# Patient Record
Sex: FEMALE | Race: BLACK OR AFRICAN AMERICAN | Employment: OTHER | URBAN - METROPOLITAN AREA
[De-identification: names, ages, dates, MRNs, and addresses within clinical notes are randomized per-mention and may not be internally consistent; named-entity substitution may affect disease eponyms.]

---

## 2011-07-01 LAB — COLONOSCOPY, EXTERNAL: NORMAL

## 2018-07-17 LAB — AMB DEXA, EXTERNAL: NORMAL

## 2019-04-05 LAB
CREATININE, EXTERNAL: 1.9
LDL-C, EXTERNAL: 98

## 2019-11-29 LAB — MAMMOGRAPHY, EXTERNAL: NORMAL

## 2020-06-08 VITALS
HEIGHT: 64 IN | SYSTOLIC BLOOD PRESSURE: 128 MMHG | HEART RATE: 72 BPM | RESPIRATION RATE: 18 BRPM | DIASTOLIC BLOOD PRESSURE: 80 MMHG | BODY MASS INDEX: 35.68 KG/M2 | WEIGHT: 209 LBS

## 2020-06-08 PROBLEM — M17.11 OSTEOARTHRITIS OF RIGHT KNEE: Status: ACTIVE | Noted: 2019-08-08

## 2020-06-08 RX ORDER — ATORVASTATIN CALCIUM 40 MG/1
TABLET, FILM COATED ORAL DAILY
COMMUNITY
End: 2020-09-10

## 2020-06-08 RX ORDER — ATENOLOL 50 MG/1
TABLET ORAL DAILY
COMMUNITY
End: 2021-03-02

## 2020-09-10 RX ORDER — ATORVASTATIN CALCIUM 40 MG/1
TABLET, FILM COATED ORAL
Qty: 90 TAB | Refills: 3 | Status: SHIPPED | OUTPATIENT
Start: 2020-09-10 | End: 2021-08-29

## 2020-09-10 RX ORDER — LOSARTAN POTASSIUM AND HYDROCHLOROTHIAZIDE 25; 100 MG/1; MG/1
TABLET ORAL
Qty: 90 TAB | Refills: 3 | Status: SHIPPED | OUTPATIENT
Start: 2020-09-10 | End: 2021-03-01 | Stop reason: ALTCHOICE

## 2020-09-21 ENCOUNTER — OFFICE VISIT (OUTPATIENT)
Dept: INTERNAL MEDICINE CLINIC | Age: 75
End: 2020-09-21
Payer: MEDICARE

## 2020-09-21 ENCOUNTER — HOSPITAL ENCOUNTER (OUTPATIENT)
Dept: LAB | Age: 75
Discharge: HOME | End: 2020-09-21
Payer: MEDICARE

## 2020-09-21 VITALS
SYSTOLIC BLOOD PRESSURE: 136 MMHG | BODY MASS INDEX: 35.88 KG/M2 | WEIGHT: 205.8 LBS | DIASTOLIC BLOOD PRESSURE: 80 MMHG | RESPIRATION RATE: 20 BRPM | TEMPERATURE: 97.9 F

## 2020-09-21 DIAGNOSIS — I10 HYPERTENSION, ESSENTIAL: Primary | ICD-10-CM

## 2020-09-21 DIAGNOSIS — I10 HYPERTENSION, ESSENTIAL: ICD-10-CM

## 2020-09-21 LAB
ALBUMIN SERPL-MCNC: 4 G/DL (ref 3.5–4.7)
ALBUMIN/GLOB SERPL: 1.2 {RATIO}
ALP SERPL-CCNC: 85 U/L (ref 38–126)
ALT SERPL-CCNC: 27 U/L (ref 3–52)
ANION GAP SERPL CALC-SCNC: 8 MMOL/L
AST SERPL W P-5'-P-CCNC: 20 U/L (ref 14–74)
BASOPHILS # BLD: 0 K/UL (ref 0–0.1)
BASOPHILS NFR BLD: 1 % (ref 0–2)
BILIRUB SERPL-MCNC: 0.5 MG/DL (ref 0.2–1)
BUN SERPL-MCNC: 58 MG/DL (ref 9–21)
BUN/CREAT SERPL: 24
CA-I BLD-MCNC: 9.4 MG/DL (ref 8.5–10.5)
CHLORIDE SERPL-SCNC: 106 MMOL/L (ref 94–111)
CO2 SERPL-SCNC: 24 MMOL/L (ref 21–33)
CREAT SERPL-MCNC: 2.4 MG/DL (ref 0.7–1.2)
EOSINOPHIL # BLD: 0.2 K/UL (ref 0–0.4)
EOSINOPHIL NFR BLD: 5 % (ref 0–5)
ERYTHROCYTE [DISTWIDTH] IN BLOOD BY AUTOMATED COUNT: 16.1 % (ref 11.6–14.5)
GLOBULIN SER CALC-MCNC: 3.4 G/DL
GLUCOSE SERPL-MCNC: 116 MG/DL (ref 70–110)
HCT VFR BLD AUTO: 40.1 % (ref 35–45)
HGB BLD-MCNC: 12.6 % (ref 12–16)
IMM GRANULOCYTES # BLD AUTO: 0 K/UL
IMM GRANULOCYTES NFR BLD AUTO: 0 %
LYMPHOCYTES # BLD: 1.6 K/UL (ref 0.9–3.6)
LYMPHOCYTES NFR BLD: 31 % (ref 21–52)
MCH RBC QN AUTO: 26.6 PG (ref 24–34)
MCHC RBC AUTO-ENTMCNC: 31.4 G/DL (ref 31–37)
MCV RBC AUTO: 84.6 FL (ref 74–97)
MONOCYTES # BLD: 0.5 K/UL (ref 0.05–1.2)
MONOCYTES NFR BLD: 9 % (ref 3–10)
NEUTS SEG # BLD: 2.8 K/UL (ref 1.8–8)
NEUTS SEG NFR BLD: 54 % (ref 40–73)
PLATELET # BLD AUTO: 229 K/UL (ref 135–420)
PMV BLD AUTO: 12.1 FL
POTASSIUM SERPL-SCNC: 3.8 MMOL/L (ref 3.2–5.1)
PROT SERPL-MCNC: 7.4 G/DL (ref 6.1–8.4)
RBC # BLD AUTO: 4.74 M/UL (ref 4.2–5.3)
SODIUM SERPL-SCNC: 138 MMOL/L (ref 135–145)
WBC # BLD AUTO: 5.1 K/UL (ref 4.6–13.2)

## 2020-09-21 PROCEDURE — 80053 COMPREHEN METABOLIC PANEL: CPT

## 2020-09-21 PROCEDURE — G8752 SYS BP LESS 140: HCPCS | Performed by: INTERNAL MEDICINE

## 2020-09-21 PROCEDURE — G8510 SCR DEP NEG, NO PLAN REQD: HCPCS | Performed by: INTERNAL MEDICINE

## 2020-09-21 PROCEDURE — 85025 COMPLETE CBC W/AUTO DIFF WBC: CPT

## 2020-09-21 PROCEDURE — G8536 NO DOC ELDER MAL SCRN: HCPCS | Performed by: INTERNAL MEDICINE

## 2020-09-21 PROCEDURE — G8754 DIAS BP LESS 90: HCPCS | Performed by: INTERNAL MEDICINE

## 2020-09-21 PROCEDURE — G9899 SCRN MAM PERF RSLTS DOC: HCPCS | Performed by: INTERNAL MEDICINE

## 2020-09-21 PROCEDURE — 99213 OFFICE O/P EST LOW 20 MIN: CPT | Performed by: INTERNAL MEDICINE

## 2020-09-21 PROCEDURE — G8417 CALC BMI ABV UP PARAM F/U: HCPCS | Performed by: INTERNAL MEDICINE

## 2020-09-21 PROCEDURE — G8427 DOCREV CUR MEDS BY ELIG CLIN: HCPCS | Performed by: INTERNAL MEDICINE

## 2020-09-21 PROCEDURE — 36415 COLL VENOUS BLD VENIPUNCTURE: CPT

## 2020-09-21 NOTE — PROGRESS NOTES
Sharon Singh presents today for   Chief Complaint   Patient presents with    Hypertension       Is someone accompanying this pt? no    Is the patient using any DME equipment during OV? no    Depression Screening:  3 most recent PHQ Screens 9/21/2020   Little interest or pleasure in doing things Not at all   Feeling down, depressed, irritable, or hopeless Not at all   Total Score PHQ 2 0       Learning Assessment:  Learning Assessment 9/21/2020   PRIMARY LEARNER Patient   HIGHEST LEVEL OF EDUCATION - PRIMARY LEARNER  GRADUATED HIGH SCHOOL OR GED   BARRIERS PRIMARY LEARNER NONE   CO-LEARNER CAREGIVER No   PRIMARY LANGUAGE ENGLISH   LEARNER PREFERENCE PRIMARY DEMONSTRATION   ANSWERED BY patient   RELATIONSHIP SELF       Abuse Screening:  Abuse Screening Questionnaire 9/21/2020   Do you ever feel afraid of your partner? N   Are you in a relationship with someone who physically or mentally threatens you? N   Is it safe for you to go home? Y       Fall Risk  Fall Risk Assessment, last 12 mths 9/21/2020   Able to walk? Yes   Fall in past 12 months? No       ADL  ADL Assessment 9/21/2020   Feeding yourself No Help Needed   Getting dressed No Help Needed   Bathing or showering No Help Needed   Walk across the room (includes cane/walker) No Help Needed   Using the telphone No Help Needed   Taking your medications No Help Needed   Preparing meals No Help Needed   Managing money (expenses/bills) No Help Needed   Moderately strenuous housework (laundry) No Help Needed   Shopping for personal items (toiletries/medicines) No Help Needed   Shopping for groceries No Help Needed   Driving No Help Needed   Climbing a flight of stairs No Help Needed   Getting to places beyond walking distances No Help Needed       Health Maintenance reviewed and discussed and ordered per Provider.     Health Maintenance Due   Topic Date Due    Hepatitis C Screening  1945    DTaP/Tdap/Td series (1 - Tdap) 04/24/1967    Shingrix Vaccine Age 50> (1 of 2) 04/24/1996    FOBT Q1Y Age 50-75  04/24/1996    GLAUCOMA SCREENING Q2Y  04/24/2011    Pneumococcal 65+ years (1 of 1 - PPSV23) 04/24/2011    Lipid Screen  04/05/2020    Flu Vaccine (1) 09/01/2020   . Coordination of Care:  1. Have you been to the ER, urgent care clinic since your last visit? Hospitalized since your last visit? no    2. Have you seen or consulted any other health care providers outside of the [address] [place] [address] since your last visit? Include any pap smears or colon screening.  no

## 2020-09-21 NOTE — PROGRESS NOTES
Sulaiman Gonzalez is a 77 y.o. female presenting for           Chief Complaint   Patient presents with    Hypertension        HPI Mike Johnson comes in today for follow-up of her hypertension she is generally doing well. She is up-to-date on all screening parameters she may. She is having no new complaints or problems. Exercise tolerance is good she tells me she was tested earlier this summer for COVID-19 apparently her community testing program and she tested negative. Past Medical History:   Diagnosis Date    Edema of foot 1/11/2016    Hyperlipidemia 6/24/2015    Hypertension, essential 9/20/2016    Osteoarthritis of right knee 8/8/2019    Pelvic mass 7/21/2016    Urinary incontinence 6/27/2016        Current Outpatient Medications on File Prior to Visit   Medication Sig Dispense Refill    atorvastatin (LIPITOR) 40 mg tablet TAKE 1 TABLET DAILY 90 Tab 3    losartan-hydroCHLOROthiazide (HYZAAR) 100-25 mg per tablet TAKE 1 TABLET DAILY 90 Tab 3    atenoloL (TENORMIN) 50 mg tablet Take  by mouth daily.  [DISCONTINUED] losartan 50 mg tab 100 mg, hydroCHLOROthiazide 25 mg tab 25 mg Take  by mouth daily. No current facility-administered medications on file prior to visit. ROS all systems are reviewed and negative. Visit Vitals  /80 (BP 1 Location: Right arm, BP Patient Position: Sitting)   Temp 97.9 °F (36.6 °C)   Resp 20   Wt 205 lb 12.8 oz (93.4 kg)   BMI 35.88 kg/m²        Physical Exam developed overweight -American woman alert oriented cooperative no acute distress normocephalic conjunctiva pink sclera anicteric. Neck supple no lymphadenopathy lungs with good air exchange no wheeze rales rubs or rhonchi heart rhythm regular no murmurs gallops or extrasystoles abdomen not examined extremities no edema neurological physiologic    Assessment & Plan I think Mike Johnson is doing well.   Is been a little over a year since she had lab work and I think that would be a reasonable thing to check a CBC and a CMP because of her hypertension and her medications and I will see her routinely in 4 months time.

## 2020-10-09 ENCOUNTER — TELEPHONE (OUTPATIENT)
Dept: INTERNAL MEDICINE CLINIC | Age: 75
End: 2020-10-09

## 2020-10-09 ENCOUNTER — IMMUNIZATION CLINIC (OUTPATIENT)
Dept: INTERNAL MEDICINE CLINIC | Age: 75
End: 2020-10-09
Payer: MEDICARE

## 2020-10-09 VITALS — TEMPERATURE: 97.1 F | RESPIRATION RATE: 20 BRPM

## 2020-10-09 DIAGNOSIS — Z23 NEEDS FLU SHOT: Primary | ICD-10-CM

## 2020-10-09 DIAGNOSIS — Z23 ENCOUNTER FOR IMMUNIZATION: ICD-10-CM

## 2020-10-09 DIAGNOSIS — Z12.39 ENCOUNTER FOR SCREENING FOR MALIGNANT NEOPLASM OF BREAST, UNSPECIFIED SCREENING MODALITY: Primary | ICD-10-CM

## 2020-10-09 NOTE — PROGRESS NOTES
Awa Lowe is a 77 y.o. female who presents for routine immunization of a influenza vaccine. She denies any symptoms , reactions or allergies that would exclude them from being immunized today. Risks and adverse reactions were discussed and the VIS was given to them. All questions were addressed. She was observed for 15 min post injection. There were no reactions observed.     Jemma Hernandez LPN

## 2020-10-12 ENCOUNTER — TRANSCRIBE ORDER (OUTPATIENT)
Dept: SCHEDULING | Age: 75
End: 2020-10-12

## 2020-10-12 DIAGNOSIS — Z12.31 VISIT FOR SCREENING MAMMOGRAM: Primary | ICD-10-CM

## 2020-11-16 PROCEDURE — 90686 IIV4 VACC NO PRSV 0.5 ML IM: CPT

## 2020-11-16 PROCEDURE — G0008 ADMIN INFLUENZA VIRUS VAC: HCPCS

## 2020-11-30 ENCOUNTER — HOSPITAL ENCOUNTER (OUTPATIENT)
Dept: MAMMOGRAPHY | Age: 75
Discharge: HOME | End: 2020-11-30
Attending: INTERNAL MEDICINE
Payer: MEDICARE

## 2020-11-30 DIAGNOSIS — Z12.31 VISIT FOR SCREENING MAMMOGRAM: ICD-10-CM

## 2020-11-30 PROCEDURE — 77067 SCR MAMMO BI INCL CAD: CPT

## 2021-01-18 ENCOUNTER — OFFICE VISIT (OUTPATIENT)
Dept: INTERNAL MEDICINE CLINIC | Age: 76
End: 2021-01-18
Payer: MEDICARE

## 2021-01-18 VITALS
BODY MASS INDEX: 35.1 KG/M2 | SYSTOLIC BLOOD PRESSURE: 130 MMHG | TEMPERATURE: 98.3 F | HEIGHT: 64 IN | DIASTOLIC BLOOD PRESSURE: 80 MMHG | WEIGHT: 205.6 LBS | RESPIRATION RATE: 20 BRPM

## 2021-01-18 DIAGNOSIS — I10 HYPERTENSION, ESSENTIAL: Primary | ICD-10-CM

## 2021-01-18 DIAGNOSIS — N18.30 STAGE 3 CHRONIC KIDNEY DISEASE, UNSPECIFIED WHETHER STAGE 3A OR 3B CKD (HCC): ICD-10-CM

## 2021-01-18 PROCEDURE — 99213 OFFICE O/P EST LOW 20 MIN: CPT | Performed by: INTERNAL MEDICINE

## 2021-01-18 RX ORDER — AMLODIPINE BESYLATE 5 MG/1
5 TABLET ORAL DAILY
Qty: 30 TAB | Refills: 2 | Status: SHIPPED | OUTPATIENT
Start: 2021-01-18 | End: 2021-03-30 | Stop reason: SDUPTHER

## 2021-01-18 NOTE — PROGRESS NOTES
Hakan Chan is a 77 y.o. female presenting for hypertension          Chief Complaint   Patient presents with    Hypertension        HPI Naif Perez comes in today and she is doing well and asymptomatic. She signed up through our health department to get the COVID-19 vaccine. Generally feels well and she had blood work done before she came in. Back 6 months ago her creatinine had crept up to 1.9 and so wanted her to repeat at this time. Past Medical History:   Diagnosis Date    Edema of foot 1/11/2016    Hyperlipidemia 6/24/2015    Hypertension, essential 9/20/2016    Menopause     Osteoarthritis of right knee 8/8/2019    Pelvic mass 7/21/2016    Urinary incontinence 6/27/2016        Current Outpatient Medications on File Prior to Visit   Medication Sig Dispense Refill    atorvastatin (LIPITOR) 40 mg tablet TAKE 1 TABLET DAILY 90 Tab 3    losartan-hydroCHLOROthiazide (HYZAAR) 100-25 mg per tablet TAKE 1 TABLET DAILY 90 Tab 3    atenoloL (TENORMIN) 50 mg tablet Take  by mouth daily. No current facility-administered medications on file prior to visit. ROS patient is asymptomatic and all systems are reviewed and negative    Visit Vitals  /80 (BP 1 Location: Right arm, BP Patient Position: Sitting)   Temp 98.3 °F (36.8 °C)   Resp 20   Ht 5' 3.5\" (1.613 m)   Wt 205 lb 9.6 oz (93.3 kg)   BMI 35.85 kg/m²        Physical Exam developed modestly overweight -American woman no acute distress normocephalic Activa pink sclera anicteric neck supple no lymphadenopathy no mass lungs clear to auscultation heart rhythm regular    Assessment & Plan unfortunately Rocio's creatinine is up to 2.4 which is quite a jump from 1.9. I think we need to intervene here and I am going to stop her losartan HCT and change her to amlodipine for blood pressure control she will continue her Tenormin.   Were going to see her back in 6 weeks and repeat her blood work in 6 weeks at which point if she has not shown significant improvement we will probably have to refer her to nephrology.

## 2021-02-22 ENCOUNTER — TELEPHONE (OUTPATIENT)
Dept: INTERNAL MEDICINE CLINIC | Age: 76
End: 2021-02-22

## 2021-02-22 DIAGNOSIS — I10 HYPERTENSION, ESSENTIAL: Primary | ICD-10-CM

## 2021-02-22 NOTE — TELEPHONE ENCOUNTER
Patient calls to report that she is having headaches and she believes that it is coming from taking the second covid vaccine last week. She also reports swelling but thinks that is due to the last medicine change that you made, the medicine doesn't have a fluid pill component and her old one did. She also says she needs lab orders sent to the hospital so that she can them done prior to her appointment on March 1.   She can be reached at [phone]

## 2021-02-23 ENCOUNTER — HOSPITAL ENCOUNTER (OUTPATIENT)
Dept: LAB | Age: 76
Discharge: HOME | End: 2021-02-23
Payer: MEDICARE

## 2021-02-23 LAB
ANION GAP SERPL CALC-SCNC: 10 MMOL/L
BUN SERPL-MCNC: 30 MG/DL (ref 9–21)
BUN/CREAT SERPL: 14
CA-I BLD-MCNC: 8.9 MG/DL (ref 8.5–10.5)
CHLORIDE SERPL-SCNC: 104 MMOL/L (ref 94–111)
CO2 SERPL-SCNC: 24 MMOL/L (ref 21–33)
CREAT SERPL-MCNC: 2.2 MG/DL (ref 0.7–1.2)
GLUCOSE SERPL-MCNC: 143 MG/DL (ref 70–110)
POTASSIUM SERPL-SCNC: 3.6 MMOL/L (ref 3.2–5.1)
SODIUM SERPL-SCNC: 138 MMOL/L (ref 135–145)

## 2021-02-23 PROCEDURE — 80048 BASIC METABOLIC PNL TOTAL CA: CPT

## 2021-02-23 PROCEDURE — 36415 COLL VENOUS BLD VENIPUNCTURE: CPT

## 2021-03-01 ENCOUNTER — OFFICE VISIT (OUTPATIENT)
Dept: INTERNAL MEDICINE CLINIC | Age: 76
End: 2021-03-01
Payer: MEDICARE

## 2021-03-01 VITALS
WEIGHT: 201.6 LBS | TEMPERATURE: 97.7 F | DIASTOLIC BLOOD PRESSURE: 74 MMHG | SYSTOLIC BLOOD PRESSURE: 130 MMHG | HEIGHT: 64 IN | BODY MASS INDEX: 34.42 KG/M2 | RESPIRATION RATE: 20 BRPM

## 2021-03-01 DIAGNOSIS — N18.9 CHRONIC KIDNEY DISEASE, UNSPECIFIED CKD STAGE: Primary | ICD-10-CM

## 2021-03-01 DIAGNOSIS — I10 HYPERTENSION, ESSENTIAL: ICD-10-CM

## 2021-03-01 PROCEDURE — 3017F COLORECTAL CA SCREEN DOC REV: CPT | Performed by: INTERNAL MEDICINE

## 2021-03-01 PROCEDURE — G8417 CALC BMI ABV UP PARAM F/U: HCPCS | Performed by: INTERNAL MEDICINE

## 2021-03-01 PROCEDURE — G8752 SYS BP LESS 140: HCPCS | Performed by: INTERNAL MEDICINE

## 2021-03-01 PROCEDURE — 1101F PT FALLS ASSESS-DOCD LE1/YR: CPT | Performed by: INTERNAL MEDICINE

## 2021-03-01 PROCEDURE — G8427 DOCREV CUR MEDS BY ELIG CLIN: HCPCS | Performed by: INTERNAL MEDICINE

## 2021-03-01 PROCEDURE — G8536 NO DOC ELDER MAL SCRN: HCPCS | Performed by: INTERNAL MEDICINE

## 2021-03-01 PROCEDURE — G8754 DIAS BP LESS 90: HCPCS | Performed by: INTERNAL MEDICINE

## 2021-03-01 PROCEDURE — G9899 SCRN MAM PERF RSLTS DOC: HCPCS | Performed by: INTERNAL MEDICINE

## 2021-03-01 PROCEDURE — 1090F PRES/ABSN URINE INCON ASSESS: CPT | Performed by: INTERNAL MEDICINE

## 2021-03-01 PROCEDURE — 99213 OFFICE O/P EST LOW 20 MIN: CPT | Performed by: INTERNAL MEDICINE

## 2021-03-01 PROCEDURE — G8510 SCR DEP NEG, NO PLAN REQD: HCPCS | Performed by: INTERNAL MEDICINE

## 2021-03-01 PROCEDURE — G8399 PT W/DXA RESULTS DOCUMENT: HCPCS | Performed by: INTERNAL MEDICINE

## 2021-03-01 NOTE — PROGRESS NOTES
Waldo Amanda is a 77 y.o. female presenting for hypertension and chronic kidney disease          Chief Complaint   Patient presents with    Hypertension        HPI. He is back today for follow-up of her hypertension. She is having some headaches which she blames on the second Covid vaccine which she received recently. Tylenol seems to work and they seem to be getting better. She is now 6 weeks off of losartan HCT and has not had any problems with the amlodipine except that occasionally having some swelling from time to time she asks for a fluid pill and I told her I thought I wanted her to see the nephrologist first and we can discuss that after they took a look at her for her chronic kidney disease. Her creatinine is slightly down to 2.2 but still not where we would like it and even though her blood pressure is better controlled I would like for her to see nephrology. Past Medical History:   Diagnosis Date    Edema of foot 1/11/2016    Hyperlipidemia 6/24/2015    Hypertension, essential 9/20/2016    Menopause     Osteoarthritis of right knee 8/8/2019    Pelvic mass 7/21/2016    Urinary incontinence 6/27/2016        Current Outpatient Medications on File Prior to Visit   Medication Sig Dispense Refill    amLODIPine (NORVASC) 5 mg tablet Take 1 Tab by mouth daily. 30 Tab 2    atorvastatin (LIPITOR) 40 mg tablet TAKE 1 TABLET DAILY 90 Tab 3    atenoloL (TENORMIN) 50 mg tablet Take  by mouth daily.  [DISCONTINUED] losartan-hydroCHLOROthiazide (HYZAAR) 100-25 mg per tablet TAKE 1 TABLET DAILY 90 Tab 3     No current facility-administered medications on file prior to visit.          ROS all systems are reviewed and negative except as noted in the history of present illness    Visit Vitals  /74 (BP 1 Location: Right arm, BP Patient Position: Sitting, BP Cuff Size: Large adult)   Temp 97.7 °F (36.5 °C)   Resp 20   Ht 5' 3.5\" (1.613 m)   Wt 201 lb 9.6 oz (91.4 kg)   BMI 35.15 kg/m²        Physical Exam well-developed -American woman alert and oriented no distress normocephalic conjunctiva pink sclera anicteric neck supple no lymphadenopathy lungs bilaterally clear to auscultation heart regular rhythm no murmurs or extrasystoles abdomen not examined, extremities with trace of ankle edema bilaterally. Assessment & Plan I would like for [facility] to see nephrology and we are going to send in a referral.  I will see her back once they are completed their work-up.

## 2021-03-01 NOTE — PROGRESS NOTES
Randell Car presents today for   Chief Complaint   Patient presents with    Hypertension       Is someone accompanying this pt? no    Is the patient using any DME equipment during OV? no    Depression Screening:  3 most recent PHQ Screens 3/1/2021   Little interest or pleasure in doing things Not at all   Feeling down, depressed, irritable, or hopeless Not at all   Total Score PHQ 2 0       Learning Assessment:  Learning Assessment 9/21/2020   PRIMARY LEARNER Patient   HIGHEST LEVEL OF EDUCATION - PRIMARY LEARNER  GRADUATED HIGH SCHOOL OR GED   BARRIERS PRIMARY LEARNER NONE   CO-LEARNER CAREGIVER No   PRIMARY LANGUAGE ENGLISH   LEARNER PREFERENCE PRIMARY DEMONSTRATION   ANSWERED BY patient   RELATIONSHIP SELF       Abuse Screening:  Abuse Screening Questionnaire 3/1/2021   Do you ever feel afraid of your partner? N   Are you in a relationship with someone who physically or mentally threatens you? N   Is it safe for you to go home? Y       Fall Risk  Fall Risk Assessment, last 12 mths 3/1/2021   Able to walk? Yes   Fall in past 12 months? 0   Do you feel unsteady? 0   Are you worried about falling 0       ADL  ADL Assessment 3/1/2021   Feeding yourself No Help Needed   Getting from bed to chair No Help Needed   Getting dressed No Help Needed   Bathing or showering No Help Needed   Walk across the room (includes cane/walker) No Help Needed   Using the telphone No Help Needed   Taking your medications No Help Needed   Preparing meals No Help Needed   Managing money (expenses/bills) No Help Needed   Moderately strenuous housework (laundry) No Help Needed   Shopping for personal items (toiletries/medicines) No Help Needed   Shopping for groceries No Help Needed   Driving No Help Needed   Climbing a flight of stairs No Help Needed   Getting to places beyond walking distances No Help Needed       Health Maintenance reviewed and discussed and ordered per Provider.     Health Maintenance Due   Topic Date Due    Hepatitis C Screening  1945    Shingrix Vaccine Age 50> (1 of 2) 04/24/1996    GLAUCOMA SCREENING Q2Y  04/24/2011    Lipid Screen  04/05/2020    Medicare Yearly Exam  09/21/2020   . Coordination of Care:  1. Have you been to the ER, urgent care clinic since your last visit? Hospitalized since your last visit? no    2. Have you seen or consulted any other health care providers outside of the [address] [place] [address] since your last visit? Include any pap smears or colon screening.  no

## 2021-03-02 RX ORDER — ATENOLOL 50 MG/1
TABLET ORAL
Qty: 180 TAB | Refills: 3 | Status: SHIPPED | OUTPATIENT
Start: 2021-03-02 | End: 2022-02-18 | Stop reason: SDUPTHER

## 2021-03-30 RX ORDER — AMLODIPINE BESYLATE 5 MG/1
5 TABLET ORAL DAILY
Qty: 90 TAB | Refills: 2 | Status: SHIPPED | OUTPATIENT
Start: 2021-03-30 | End: 2021-06-28

## 2021-04-16 ENCOUNTER — HOSPITAL ENCOUNTER (EMERGENCY)
Age: 76
Discharge: HOME | End: 2021-04-16
Attending: EMERGENCY MEDICINE
Payer: MEDICARE

## 2021-04-16 ENCOUNTER — APPOINTMENT (OUTPATIENT)
Dept: VASCULAR SURGERY | Age: 76
End: 2021-04-16
Attending: EMERGENCY MEDICINE
Payer: MEDICARE

## 2021-04-16 ENCOUNTER — APPOINTMENT (OUTPATIENT)
Dept: GENERAL RADIOLOGY | Age: 76
End: 2021-04-16
Attending: EMERGENCY MEDICINE
Payer: MEDICARE

## 2021-04-16 VITALS
HEART RATE: 69 BPM | RESPIRATION RATE: 18 BRPM | SYSTOLIC BLOOD PRESSURE: 132 MMHG | TEMPERATURE: 98.6 F | OXYGEN SATURATION: 99 % | DIASTOLIC BLOOD PRESSURE: 70 MMHG

## 2021-04-16 DIAGNOSIS — M71.22 BAKER'S CYST OF KNEE, LEFT: Primary | ICD-10-CM

## 2021-04-16 DIAGNOSIS — M17.12 OSTEOARTHRITIS OF LEFT KNEE, UNSPECIFIED OSTEOARTHRITIS TYPE: ICD-10-CM

## 2021-04-16 LAB
ANION GAP SERPL CALC-SCNC: 12 MMOL/L
BASOPHILS # BLD: 0 K/UL (ref 0–0.1)
BASOPHILS NFR BLD: 0 % (ref 0–2)
BUN SERPL-MCNC: 40 MG/DL (ref 9–21)
BUN/CREAT SERPL: 17
CA-I BLD-MCNC: 8.9 MG/DL (ref 8.5–10.5)
CHLORIDE SERPL-SCNC: 101 MMOL/L (ref 94–111)
CO2 SERPL-SCNC: 24 MMOL/L (ref 21–33)
CREAT SERPL-MCNC: 2.4 MG/DL (ref 0.7–1.2)
D DIMER PPP FEU-MCNC: 3.14 UG/ML(FEU)
EOSINOPHIL # BLD: 0.1 K/UL (ref 0–0.4)
EOSINOPHIL NFR BLD: 1 % (ref 0–5)
ERYTHROCYTE [DISTWIDTH] IN BLOOD BY AUTOMATED COUNT: 16.2 % (ref 11.6–14.5)
GLUCOSE SERPL-MCNC: 140 MG/DL (ref 70–110)
HCT VFR BLD AUTO: 35.5 % (ref 35–45)
HGB BLD-MCNC: 10.9 G/DL (ref 12–16)
IMM GRANULOCYTES # BLD AUTO: 0 K/UL
IMM GRANULOCYTES NFR BLD AUTO: 0 %
LYMPHOCYTES # BLD: 1 K/UL (ref 0.9–3.6)
LYMPHOCYTES NFR BLD: 13 % (ref 21–52)
MCH RBC QN AUTO: 25.3 PG (ref 24–34)
MCHC RBC AUTO-ENTMCNC: 30.7 G/DL (ref 31–37)
MCV RBC AUTO: 82.4 FL (ref 74–97)
MONOCYTES # BLD: 1 K/UL (ref 0.05–1.2)
MONOCYTES NFR BLD: 13 % (ref 3–10)
NEUTS SEG # BLD: 5.8 K/UL (ref 1.8–8)
NEUTS SEG NFR BLD: 73 % (ref 40–73)
PLATELET # BLD AUTO: 278 K/UL (ref 135–420)
PMV BLD AUTO: 12.1 FL (ref 9.2–11.8)
POTASSIUM SERPL-SCNC: 3.5 MMOL/L (ref 3.2–5.1)
RBC # BLD AUTO: 4.31 M/UL (ref 4.2–5.3)
SODIUM SERPL-SCNC: 137 MMOL/L (ref 135–145)
WBC # BLD AUTO: 7.9 K/UL (ref 4.6–13.2)

## 2021-04-16 PROCEDURE — 80048 BASIC METABOLIC PNL TOTAL CA: CPT

## 2021-04-16 PROCEDURE — 85025 COMPLETE CBC W/AUTO DIFF WBC: CPT

## 2021-04-16 PROCEDURE — 73564 X-RAY EXAM KNEE 4 OR MORE: CPT

## 2021-04-16 PROCEDURE — 85379 FIBRIN DEGRADATION QUANT: CPT

## 2021-04-16 PROCEDURE — 74011250637 HC RX REV CODE- 250/637: Performed by: EMERGENCY MEDICINE

## 2021-04-16 PROCEDURE — 99283 EMERGENCY DEPT VISIT LOW MDM: CPT

## 2021-04-16 PROCEDURE — 36415 COLL VENOUS BLD VENIPUNCTURE: CPT

## 2021-04-16 PROCEDURE — 93971 EXTREMITY STUDY: CPT

## 2021-04-16 RX ORDER — KETOROLAC TROMETHAMINE 10 MG/1
10 TABLET, FILM COATED ORAL ONCE
Status: COMPLETED | OUTPATIENT
Start: 2021-04-16 | End: 2021-04-16

## 2021-04-16 RX ORDER — KETOROLAC TROMETHAMINE 10 MG/1
10 TABLET, FILM COATED ORAL
Qty: 15 TAB | Refills: 0 | Status: SHIPPED | OUTPATIENT
Start: 2021-04-16 | End: 2021-05-13 | Stop reason: SINTOL

## 2021-04-16 RX ORDER — KETOROLAC TROMETHAMINE 10 MG/1
10 TABLET, FILM COATED ORAL
Qty: 15 TAB | Refills: 0 | Status: SHIPPED | OUTPATIENT
Start: 2021-04-16 | End: 2021-04-16 | Stop reason: SDUPTHER

## 2021-04-16 RX ORDER — CHOLECALCIFEROL TAB 125 MCG (5000 UNIT) 125 MCG
5000 TAB ORAL DAILY
COMMUNITY

## 2021-04-16 RX ORDER — ASPIRIN 81 MG/1
81 TABLET ORAL DAILY
COMMUNITY

## 2021-04-16 RX ADMIN — KETOROLAC TROMETHAMINE 10 MG: 10 TABLET, FILM COATED ORAL at 11:01

## 2021-04-16 NOTE — ED NOTES
Patient presents to the ED with  in wheelchair with complaint of swelling to the left knee and left lower leg. There is no trauma or injury.

## 2021-04-16 NOTE — ED PROVIDER NOTES
EMERGENCY DEPARTMENT HISTORY AND PHYSICAL EXAM      Date: 4/16/2021  Patient Name: Penelope Veliz    History of Presenting Illness     Chief Complaint   Patient presents with    Knee Pain       History Provided By: Patient    HPI: Penelope Veliz, 77 y.o. female with a past medical history significant hypertension, hyperlipidemia and Osteoarthritis right knee, history of pelvic mass presents to the ED with cc of pain left knee with swelling of left leg over the last several days. Patient denies any trauma. She rates the pain a 6 out of 10. And is worse with exertion. She has no fever, no nausea or vomiting. There are no other complaints, changes, or physical findings at this time. PCP: Lamar Street MD    No current facility-administered medications on file prior to encounter. Current Outpatient Medications on File Prior to Encounter   Medication Sig Dispense Refill    cholecalciferol (Vitamin D3) (5000 Units/125 mcg) tab tablet Take 5,000 Units by mouth daily.  aspirin delayed-release 81 mg tablet Take 81 mg by mouth daily.  amLODIPine (NORVASC) 5 mg tablet Take 1 Tab by mouth daily for 90 days.  90 Tab 2    atenoloL (TENORMIN) 50 mg tablet TAKE 1 TABLET TWICE A DAY  (NEW DIRECTIONS) 180 Tab 3    atorvastatin (LIPITOR) 40 mg tablet TAKE 1 TABLET DAILY 90 Tab 3       Past History     Past Medical History:  Past Medical History:   Diagnosis Date    Edema of foot 1/11/2016    Hyperlipidemia 6/24/2015    Hypertension, essential 9/20/2016    Menopause     Osteoarthritis of right knee 8/8/2019    Pelvic mass 7/21/2016    Urinary incontinence 6/27/2016       Past Surgical History:  Past Surgical History:   Procedure Laterality Date    HX COLONOSCOPY  07/01/2011    DIVERTICULA IN THE LEFT COLON- RECOMMENDED 5 YEAR F/U    HX HYSTERECTOMY  UNKNOWN    PARTIAL- STILL HAS OVARIES       Family History:  Family History   Problem Relation Age of Onset    Breast Cancer Sister     Kidney Disease Brother         KIDNEY TRANSPLANT    Arthritis-osteo Son         MENTALLY DELAYED       Social History:  Social History     Tobacco Use    Smoking status: Never Smoker    Smokeless tobacco: Never Used   Substance Use Topics    Alcohol use: Not on file    Drug use: Not on file       Allergies:  No Known Allergies      Review of Systems     Review of Systems   Constitutional: Negative for chills and fever. HENT: Negative for congestion and sore throat. Respiratory: Negative for cough and shortness of breath. Cardiovascular: Negative for chest pain. Gastrointestinal: Negative for abdominal distention, nausea and vomiting. Genitourinary: Negative for difficulty urinating, dysuria, flank pain, frequency, hematuria, urgency, vaginal bleeding and vaginal discharge. Musculoskeletal: Positive for arthralgias and joint swelling. Skin: Negative for rash and wound. Neurological: Negative for dizziness, weakness, light-headedness and headaches. Hematological: Negative for adenopathy. Physical Exam     Physical Exam  Vitals signs and nursing note reviewed. Constitutional:       General: She is not in acute distress. Appearance: She is well-developed. She is not diaphoretic. Comments: Obese female in no distress. HENT:      Head: Normocephalic and atraumatic. Jaw: No trismus. Right Ear: External ear normal. No swelling or tenderness. Tympanic membrane is not perforated, erythematous or bulging. Left Ear: External ear normal. No swelling or tenderness. Tympanic membrane is not perforated, erythematous or bulging. Nose: Nose normal. No mucosal edema or rhinorrhea. Right Sinus: No maxillary sinus tenderness or frontal sinus tenderness. Left Sinus: No maxillary sinus tenderness or frontal sinus tenderness. Mouth/Throat:      Mouth: No oral lesions. Dentition: No dental abscesses. Pharynx: Uvula midline.  No oropharyngeal exudate, posterior oropharyngeal erythema or uvula swelling. Tonsils: No tonsillar abscesses. Eyes:      General: No scleral icterus. Right eye: No discharge. Left eye: No discharge. Conjunctiva/sclera: Conjunctivae normal.   Neck:      Musculoskeletal: Normal range of motion and neck supple. Cardiovascular:      Rate and Rhythm: Normal rate and regular rhythm. Heart sounds: Normal heart sounds. No murmur. No friction rub. No gallop. Pulmonary:      Effort: Pulmonary effort is normal. No tachypnea, accessory muscle usage or respiratory distress. Breath sounds: Normal breath sounds. No decreased breath sounds, wheezing, rhonchi or rales. Abdominal:      Palpations: Abdomen is soft. Musculoskeletal: Normal range of motion. General: No tenderness. Comments: Left leg with 2+ edema. There is no warmth to touch of the skin. There is tenderness diffusely of the knee worse posteriorly. No palpable mass. Tarun Rhymes' sign is positive. Pulses in the left leg intact. Lymphadenopathy:      Cervical: No cervical adenopathy. Skin:     General: Skin is warm and dry. Findings: No erythema or rash. Neurological:      Mental Status: She is alert and oriented to person, place, and time. Psychiatric:         Judgment: Judgment normal.         Lab and Diagnostic Study Results     Labs -   No results found for this or any previous visit (from the past 12 hour(s)). Radiologic Studies -   @lastxrresult@  CT Results  (Last 48 hours)    None        CXR Results  (Last 48 hours)    None            Medical Decision Making   - I am the first provider for this patient. - I reviewed the vital signs, available nursing notes, past medical history, past surgical history, family history and social history. - Initial assessment performed. The patients presenting problems have been discussed, and they are in agreement with the care plan formulated and outlined with them.   I have encouraged them to ask questions as they arise throughout their visit. Vital Signs-Reviewed the patient's vital signs. Patient Vitals for the past 12 hrs:   Temp Pulse Resp SpO2   04/16/21 1026 98.6 °F (37 °C) 69 18 99 %       Records Reviewed: Nursing Notes and Old Medical Records    The patient presents with pain and swelling left leg with a differential diagnosis of DVT, cellulitis, venous stasis, knee arthritis, Baker's cyst      ED Course:          Provider Notes (Medical Decision Making): MDM       Procedures   Medical Decision Makingedical Decision Making  Performed by: Bibiana Womack MD  PROCEDURES:  Procedures       Disposition   Disposition: Condition stable    Diagnosis: No diagnosis found. Disposition:     Follow-up Information    None         Patient's Medications   Start Taking    No medications on file   Continue Taking    AMLODIPINE (NORVASC) 5 MG TABLET    Take 1 Tab by mouth daily for 90 days. ASPIRIN DELAYED-RELEASE 81 MG TABLET    Take 81 mg by mouth daily. ATENOLOL (TENORMIN) 50 MG TABLET    TAKE 1 TABLET TWICE A DAY  (NEW DIRECTIONS)    ATORVASTATIN (LIPITOR) 40 MG TABLET    TAKE 1 TABLET DAILY    CHOLECALCIFEROL (VITAMIN D3) (5000 UNITS/125 MCG) TAB TABLET    Take 5,000 Units by mouth daily. These Medications have changed    No medications on file   Stop Taking    No medications on file       DISCHARGE PLAN:  1. Current Discharge Medication List      CONTINUE these medications which have NOT CHANGED    Details   cholecalciferol (Vitamin D3) (5000 Units/125 mcg) tab tablet Take 5,000 Units by mouth daily. aspirin delayed-release 81 mg tablet Take 81 mg by mouth daily. amLODIPine (NORVASC) 5 mg tablet Take 1 Tab by mouth daily for 90 days. Qty: 90 Tab, Refills: 2      atenoloL (TENORMIN) 50 mg tablet TAKE 1 TABLET TWICE A DAY  (NEW DIRECTIONS)  Qty: 180 Tab, Refills: 3      atorvastatin (LIPITOR) 40 mg tablet TAKE 1 TABLET DAILY  Qty: 90 Tab, Refills: 3           2. Follow-up Information    None       3. Return to ED if worse   4. Current Discharge Medication List            Diagnosis     Clinical Impression: No diagnosis found. Attestations:    Lev Jacobs MD    Please note that this dictation was completed with [employer], the computer voice recognition software. Quite often unanticipated grammatical, syntax, homophones, and other interpretive errors are inadvertently transcribed by the computer software. Please disregard these errors. Please excuse any errors that have escaped final proofreading. Thank you.

## 2021-04-16 NOTE — ED NOTES
Searched for a vein to get labs from - unable to find one on either arm. Lab called and they will come and do a straight stick.

## 2021-04-19 ENCOUNTER — TELEPHONE (OUTPATIENT)
Dept: INTERNAL MEDICINE CLINIC | Age: 76
End: 2021-04-19

## 2021-04-19 NOTE — TELEPHONE ENCOUNTER
Patient went to ER on Friday and they told her to call you. She is trying to get up with you this morning. Could you call her. She needs to know what she needs to do next. I have talked to the patient and she will take Tylenol and Ibuprofen in between the Toradol she was given in the ER until her appt on Thursday. If this doesn't work she will call back and we will address at that time. Gertrude Foster LPN.  Dr Street informed

## 2021-04-22 ENCOUNTER — OFFICE VISIT (OUTPATIENT)
Dept: INTERNAL MEDICINE CLINIC | Age: 76
End: 2021-04-22
Payer: MEDICARE

## 2021-04-22 VITALS
BODY MASS INDEX: 34.31 KG/M2 | RESPIRATION RATE: 20 BRPM | HEIGHT: 64 IN | SYSTOLIC BLOOD PRESSURE: 160 MMHG | WEIGHT: 201 LBS | DIASTOLIC BLOOD PRESSURE: 78 MMHG

## 2021-04-22 DIAGNOSIS — M25.462 EFFUSION OF LEFT KNEE: ICD-10-CM

## 2021-04-22 DIAGNOSIS — M71.22 SYNOVIAL CYST OF LEFT POPLITEAL SPACE: Primary | ICD-10-CM

## 2021-04-22 DIAGNOSIS — N18.30 STAGE 3 CHRONIC KIDNEY DISEASE, UNSPECIFIED WHETHER STAGE 3A OR 3B CKD (HCC): ICD-10-CM

## 2021-04-22 DIAGNOSIS — I10 HYPERTENSION, ESSENTIAL: ICD-10-CM

## 2021-04-22 PROCEDURE — G9899 SCRN MAM PERF RSLTS DOC: HCPCS | Performed by: INTERNAL MEDICINE

## 2021-04-22 PROCEDURE — G8417 CALC BMI ABV UP PARAM F/U: HCPCS | Performed by: INTERNAL MEDICINE

## 2021-04-22 PROCEDURE — G8753 SYS BP > OR = 140: HCPCS | Performed by: INTERNAL MEDICINE

## 2021-04-22 PROCEDURE — G8427 DOCREV CUR MEDS BY ELIG CLIN: HCPCS | Performed by: INTERNAL MEDICINE

## 2021-04-22 PROCEDURE — G8754 DIAS BP LESS 90: HCPCS | Performed by: INTERNAL MEDICINE

## 2021-04-22 PROCEDURE — 1101F PT FALLS ASSESS-DOCD LE1/YR: CPT | Performed by: INTERNAL MEDICINE

## 2021-04-22 PROCEDURE — 99213 OFFICE O/P EST LOW 20 MIN: CPT | Performed by: INTERNAL MEDICINE

## 2021-04-22 PROCEDURE — 1090F PRES/ABSN URINE INCON ASSESS: CPT | Performed by: INTERNAL MEDICINE

## 2021-04-22 PROCEDURE — G8399 PT W/DXA RESULTS DOCUMENT: HCPCS | Performed by: INTERNAL MEDICINE

## 2021-04-22 PROCEDURE — G8510 SCR DEP NEG, NO PLAN REQD: HCPCS | Performed by: INTERNAL MEDICINE

## 2021-04-22 PROCEDURE — G8536 NO DOC ELDER MAL SCRN: HCPCS | Performed by: INTERNAL MEDICINE

## 2021-04-22 PROCEDURE — 3017F COLORECTAL CA SCREEN DOC REV: CPT | Performed by: INTERNAL MEDICINE

## 2021-04-22 RX ORDER — NAPROXEN 500 MG/1
500 TABLET ORAL 2 TIMES DAILY WITH MEALS
Qty: 42 TAB | Refills: 1 | Status: SHIPPED | OUTPATIENT
Start: 2021-04-22 | End: 2021-05-13 | Stop reason: SINTOL

## 2021-04-22 NOTE — PROGRESS NOTES
Al Maya is a 77 y.o. female presenting for left knee pain          Chief Complaint   Patient presents with    ED Follow-up        HPI Adriano Damon comes in after recent emergency department visit for left knee pain. The knee was swollen even more than it is today and quite painful to the point where she could not walk. In the emergency department x-ray did not show any fractures she did have what clinically appeared to be a Baker's cyst and she was started on ketorolac. The pain and swelling is much better but I explained to her that she could not stay on ketorolac for any length of time. She said she only had 2 pills left and so we will switch her to some naproxen. She has not been able to see the nephrologist yet and in fact will see them for another 3 weeks. Past Medical History:   Diagnosis Date    Edema of foot 1/11/2016    Hyperlipidemia 6/24/2015    Hypertension, essential 9/20/2016    Menopause     Osteoarthritis of right knee 8/8/2019    Pelvic mass 7/21/2016    Urinary incontinence 6/27/2016        Current Outpatient Medications on File Prior to Visit   Medication Sig Dispense Refill    cholecalciferol (Vitamin D3) (5000 Units/125 mcg) tab tablet Take 5,000 Units by mouth daily.  aspirin delayed-release 81 mg tablet Take 81 mg by mouth daily.  ketorolac (TORADOL) 10 mg tablet Take 1 Tab by mouth every eight (8) hours as needed for Pain. 15 Tab 0    amLODIPine (NORVASC) 5 mg tablet Take 1 Tab by mouth daily for 90 days. 90 Tab 2    atenoloL (TENORMIN) 50 mg tablet TAKE 1 TABLET TWICE A DAY  (NEW DIRECTIONS) 180 Tab 3    atorvastatin (LIPITOR) 40 mg tablet TAKE 1 TABLET DAILY 90 Tab 3     No current facility-administered medications on file prior to visit.          ROS all systems are reviewed and negative  Except as noted in the history of present illness    Visit Vitals  BP (!) 160/78 (BP 1 Location: Right arm, BP Patient Position: Sitting, BP Cuff Size: Large adult)   Resp 20 Ht 5' 3.5\" (1.613 m)   Wt 201 lb (91.2 kg)   BMI 35.05 kg/m²        Physical Exam well-developed overweight -American woman alert oriented cooperative no acute distress normocephalic conjunctiva pink sclera anicteric neck supple lungs bilaterally clear to auscultation heart regular rhythm no murmurs or extrasystoles left knee is swollen about 1-1/2 times the size of the right 1. Minimal crepitance and she does not appear to have a Baker's cyst as well. Its not acutely tender. Assessment & Plan given her chronic kidney disease treating her with a nonsteroidal is probably going to be a little dicey. She is going to use Tylenol and I suppose naproxen would be the least problematic choice for now. I am only giving her 3 weeks worth until she can get into see nephrology and she also needs a referral to orthopedics.

## 2021-05-13 ENCOUNTER — OFFICE VISIT (OUTPATIENT)
Dept: NEPHROLOGY | Age: 76
End: 2021-05-13
Payer: MEDICARE

## 2021-05-13 VITALS
DIASTOLIC BLOOD PRESSURE: 83 MMHG | WEIGHT: 200.4 LBS | BODY MASS INDEX: 34.94 KG/M2 | HEART RATE: 88 BPM | SYSTOLIC BLOOD PRESSURE: 186 MMHG

## 2021-05-13 DIAGNOSIS — N17.9 AKI (ACUTE KIDNEY INJURY) (HCC): Primary | ICD-10-CM

## 2021-05-13 DIAGNOSIS — I10 HYPERTENSION, ESSENTIAL: ICD-10-CM

## 2021-05-13 DIAGNOSIS — N18.4 CKD (CHRONIC KIDNEY DISEASE) STAGE 4, GFR 15-29 ML/MIN (HCC): ICD-10-CM

## 2021-05-13 PROCEDURE — G8427 DOCREV CUR MEDS BY ELIG CLIN: HCPCS | Performed by: INTERNAL MEDICINE

## 2021-05-13 PROCEDURE — 1090F PRES/ABSN URINE INCON ASSESS: CPT | Performed by: INTERNAL MEDICINE

## 2021-05-13 PROCEDURE — G8399 PT W/DXA RESULTS DOCUMENT: HCPCS | Performed by: INTERNAL MEDICINE

## 2021-05-13 PROCEDURE — G8417 CALC BMI ABV UP PARAM F/U: HCPCS | Performed by: INTERNAL MEDICINE

## 2021-05-13 PROCEDURE — 99204 OFFICE O/P NEW MOD 45 MIN: CPT | Performed by: INTERNAL MEDICINE

## 2021-05-13 PROCEDURE — G8536 NO DOC ELDER MAL SCRN: HCPCS | Performed by: INTERNAL MEDICINE

## 2021-05-13 PROCEDURE — G8754 DIAS BP LESS 90: HCPCS | Performed by: INTERNAL MEDICINE

## 2021-05-13 PROCEDURE — 1101F PT FALLS ASSESS-DOCD LE1/YR: CPT | Performed by: INTERNAL MEDICINE

## 2021-05-13 PROCEDURE — G8753 SYS BP > OR = 140: HCPCS | Performed by: INTERNAL MEDICINE

## 2021-05-13 PROCEDURE — G8510 SCR DEP NEG, NO PLAN REQD: HCPCS | Performed by: INTERNAL MEDICINE

## 2021-05-13 RX ORDER — FUROSEMIDE 40 MG/1
40 TABLET ORAL
Qty: 30 TAB | Refills: 3 | Status: SHIPPED | OUTPATIENT
Start: 2021-05-13 | End: 2021-12-29

## 2021-05-13 NOTE — PROGRESS NOTES
Renal Consultation Note    NAME:  Lien Villagomez   :   1945   MRN:   [id number]     ATTENDING: No admitting provider for patient encounter. PCP:  Jimbo Street MD    Date/Time:  2021 12:58 PM      Subjective:   Patient is a 56-year-old Afro-American female with history of hypertension chronic kidney disease who has been referred for evaluation of elevated creatinine. She had labs done recently which showed a creatinine of 2.4 which prompted her referral.  Review of her records since  shows her creatinine has ranged from 2.2-2.4. She denies any complaints at today's visit. She was on losartan/hydrochlorthiazide which was recently discontinued because of elevated creatinine. Her blood pressure has been well controlled since 2 which is elevated in the office today at 186/83 mmHg. According to the patient it was in the 130s range when she checked it at home. She is however complaining of swelling in her legs since hydrochlorothiazide was discontinued. She has been started on naproxen for knee pain which she is supposed to take for 21 days. I also see Toradol on her list 10 mg.         Past Medical History:   Diagnosis Date    Edema of foot 2016    Hyperlipidemia 2015    Hypertension, essential 2016    Menopause     Osteoarthritis of right knee 2019    Pelvic mass 2016    Urinary incontinence 2016      Past Surgical History:   Procedure Laterality Date    HX COLONOSCOPY  2011    DIVERTICULA IN THE LEFT COLON- RECOMMENDED 5 YEAR F/U    HX HYSTERECTOMY  UNKNOWN    PARTIAL- STILL HAS OVARIES     Social History     Tobacco Use    Smoking status: Never Smoker    Smokeless tobacco: Never Used   Substance Use Topics    Alcohol use: Not on file      Family History   Problem Relation Age of Onset    Breast Cancer Sister     Kidney Disease Brother         KIDNEY TRANSPLANT    Arthritis-osteo Son         MENTALLY DELAYED       No Known Allergies Prior to Admission medications    Medication Sig Start Date End Date Taking? Authorizing Provider   furosemide (LASIX) 40 mg tablet Take 1 Tab by mouth daily as needed (swelling). 5/13/21  Yes Nelsy Pandya MD   cholecalciferol (Vitamin D3) (5000 Units/125 mcg) tab tablet Take 5,000 Units by mouth daily. Yes Art, MD Janene   aspirin delayed-release 81 mg tablet Take 81 mg by mouth daily. Yes Art, MD Janene   amLODIPine (NORVASC) 5 mg tablet Take 1 Tab by mouth daily for 90 days. 3/30/21 6/28/21 Yes Janet Street MD   atenoloL (TENORMIN) 50 mg tablet TAKE 1 TABLET TWICE A DAY  (NEW DIRECTIONS) 3/2/21  Yes Janet Street MD   atorvastatin (LIPITOR) 40 mg tablet TAKE 1 TABLET DAILY 9/10/20  Yes Janet Street MD       REVIEW OF SYSTEMS:       Constitutional: Negative for chills and fever. HENT: Negative. Eyes: Negative. Respiratory: Negative. Cardiovascular: Negative. Gastrointestinal: Negative for abdominal pain and nausea. Skin: Negative. Neurological: Negative. Objective:   VITALS:    Visit Vitals  BP (!) 186/83 (BP 1 Location: Left arm, BP Patient Position: Sitting) Comment: rechecked 178/90   Pulse 88   Wt 90.9 kg (200 lb 6.4 oz)   BMI 34.94 kg/m²     @TMAX(24)@    PHYSICAL EXAM:     General: NAD, alert, cooperative  Eyes: sclera anicteric  Oral Cavity: No thrush or ulcers  Neck: no JVD  Chest: Fair bilateral air entry. No Wheezing or Rhonchi. No rales.   Heart: normal sounds  Abdomen: soft and non tender   : no anne  Lower Extremities: 1+ pitting edema both legs   Skin: no rash  Neuro: intact, no focals  Psychiatric: non-depressed          LAB DATA REVIEWED:    Labs from 2/23/2021 show sodium 138 potassium 3.6 chloride 104 CO2 24 glucose 143 BUN 30 creatinine 2.2 GFR 26 calcium 8.9 hemoglobin 10.9  Labs from 4/16/2021 show sodium 137 potassium 3.5 chloride 101 CO2 27 glucose 140 BUN 40 creatinine 2.4 GFR 24 calcium 8.9    Recommendations/Plan:     #1 acute kidney injury on chronic kidney disease stage III/IV  -Her creatinine has been in the 2.2-2.4 range over the past 1 year with GFR of only 24-26  -This could be her baseline considering her labs are unchanged over the past year. 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 I am discontinuing naproxen today. Also advised her to discontinue Toradol which she is not sure she is taking  -I will repeat her labs in 6 weeks after discontinuing NSAIDs  -We will check renal ultrasound to rule out any cysts or masses  -Her losartan was discontinued recently because of elevated creatinine. If I find she has significant proteinuria I will restart it considering this is her baseline  -We will check urine analysis. Quantify proteinuria  -Check urine and serum electrophoresis  -Follow-up on renal function in 6 weeks    #2 hypertension  -Blood pressure is markedly elevated in the office today but she reports in the 1 20-1 30 range at home  -She is on Norvasc 5 mg daily atenolol 50 mg daily which I will continue. I am also adding Lasix 40 mg as needed for leg edema  -Advised her to be on a low-salt diet which she is not compliant with yet  -I have asked her to call me if the blood pressure is beyond parameters at home    #3 renal osteodystrophy  -We will check intact parathyroid hormone and 25-hydroxy vitamin D level  -Calcium is okay. We will check phosphorus level    #4 anemia  -hemoglobin is 10.9. Likely anemia of chronic kidney disease.    -No need for MAJO agents yet.   I will check iron studies with next labs      Thank you for the referral.  I will see her back in the office in 6 to 8 weeks with preclinic labs    ________________________________________________________________________  Signed: Germán Gupta MD

## 2021-05-13 NOTE — PROGRESS NOTES
Aster Betancur presents today for   Chief Complaint   Patient presents with    New Patient       Is someone accompanying this pt? no    Is the patient using any DME equipment during OV? no    Depression Screening:  3 most recent PHQ Screens 5/13/2021   Little interest or pleasure in doing things Not at all   Feeling down, depressed, irritable, or hopeless Not at all   Total Score PHQ 2 0       Learning Assessment:  Learning Assessment 9/21/2020   PRIMARY LEARNER Patient   HIGHEST LEVEL OF EDUCATION - PRIMARY LEARNER  GRADUATED HIGH SCHOOL OR GED   BARRIERS PRIMARY LEARNER NONE   CO-LEARNER CAREGIVER No   PRIMARY LANGUAGE ENGLISH   LEARNER PREFERENCE PRIMARY DEMONSTRATION   ANSWERED BY patient   RELATIONSHIP SELF       Fall Risk  Fall Risk Assessment, last 12 mths 5/13/2021   Able to walk? Yes   Fall in past 12 months? 0   Do you feel unsteady? 0   Are you worried about falling 0       Coordination of Care:  1. Have you been to the ER, urgent care clinic since your last visit? Hospitalized since your last visit? Yes, [facility] knee pain, has a baker's cyst    2. Have you seen or consulted any other health care providers outside of the [address] [place] [address] since your last visit? Include any pap smears or colon screening.  Yes, [facility]

## 2021-05-17 ENCOUNTER — HOSPITAL ENCOUNTER (OUTPATIENT)
Dept: ULTRASOUND IMAGING | Age: 76
Discharge: HOME | End: 2021-05-17
Attending: INTERNAL MEDICINE
Payer: MEDICARE

## 2021-05-17 DIAGNOSIS — N17.9 AKI (ACUTE KIDNEY INJURY) (HCC): ICD-10-CM

## 2021-05-17 PROCEDURE — 76770 US EXAM ABDO BACK WALL COMP: CPT

## 2021-06-13 DIAGNOSIS — I10 HYPERTENSION, ESSENTIAL: ICD-10-CM

## 2021-06-13 DIAGNOSIS — N18.4 CKD (CHRONIC KIDNEY DISEASE) STAGE 4, GFR 15-29 ML/MIN (HCC): ICD-10-CM

## 2021-06-13 DIAGNOSIS — N17.9 AKI (ACUTE KIDNEY INJURY) (HCC): ICD-10-CM

## 2021-06-21 ENCOUNTER — HOSPITAL ENCOUNTER (OUTPATIENT)
Dept: LAB | Age: 76
Discharge: HOME | End: 2021-06-21
Payer: MEDICARE

## 2021-06-21 DIAGNOSIS — N18.4 CKD (CHRONIC KIDNEY DISEASE) STAGE 4, GFR 15-29 ML/MIN (HCC): ICD-10-CM

## 2021-06-21 DIAGNOSIS — N17.9 AKI (ACUTE KIDNEY INJURY) (HCC): ICD-10-CM

## 2021-06-21 DIAGNOSIS — I10 HYPERTENSION, ESSENTIAL: ICD-10-CM

## 2021-06-21 LAB
ALBUMIN SERPL-MCNC: 3.6 G/DL (ref 3.5–4.7)
ANION GAP SERPL CALC-SCNC: 12 MMOL/L
APPEARANCE UR: ABNORMAL
BACTERIA URNS QL MICRO: ABNORMAL /HPF
BILIRUB UR QL: NEGATIVE
BUN SERPL-MCNC: 46 MG/DL (ref 9–21)
BUN/CREAT SERPL: 19
CA-I BLD-MCNC: 9.4 MG/DL (ref 8.5–10.5)
CHLORIDE SERPL-SCNC: 103 MMOL/L (ref 94–111)
CO2 SERPL-SCNC: 23 MMOL/L (ref 21–33)
COLOR UR: ABNORMAL
CREAT SERPL-MCNC: 2.4 MG/DL (ref 0.7–1.2)
CREAT UR-MCNC: 130.3 MG/DL (ref 22–392)
EPITH CASTS URNS QL MICRO: ABNORMAL /LPF (ref 0–20)
GLUCOSE SERPL-MCNC: 123 MG/DL (ref 70–110)
GLUCOSE UR STRIP.AUTO-MCNC: NEGATIVE MG/DL
HGB UR QL STRIP: ABNORMAL
KETONES UR QL STRIP.AUTO: NEGATIVE MG/DL
LEUKOCYTE ESTERASE UR QL STRIP.AUTO: ABNORMAL
NITRITE UR QL STRIP.AUTO: NEGATIVE
PH UR STRIP: 5 [PH] (ref 5–9)
PHOSPHATE SERPL-MCNC: 4.3 MG/DL (ref 2.5–4.5)
POTASSIUM SERPL-SCNC: 3.7 MMOL/L (ref 3.2–5.1)
PROT UR STRIP-MCNC: 30 MG/DL
PROT UR-MCNC: 39 MG/DL (ref 6–10)
PROT UR-MCNC: 40 MG/DL (ref 6–10)
PROT/CREAT UR-RTO: 0.3
RBC #/AREA URNS HPF: ABNORMAL /HPF (ref 0–2)
SODIUM SERPL-SCNC: 138 MMOL/L (ref 135–145)
SP GR UR REFRACTOMETRY: 1.01 (ref 1–1.03)
UROBILINOGEN UR QL STRIP.AUTO: 0.2 EU/DL (ref 0.2–1)
WBC URNS QL MICRO: ABNORMAL /HPF (ref 0–4)

## 2021-06-21 PROCEDURE — 80069 RENAL FUNCTION PANEL: CPT

## 2021-06-21 PROCEDURE — 84156 ASSAY OF PROTEIN URINE: CPT

## 2021-06-21 PROCEDURE — 82306 VITAMIN D 25 HYDROXY: CPT

## 2021-06-21 PROCEDURE — 81001 URINALYSIS AUTO W/SCOPE: CPT

## 2021-06-22 LAB — 25(OH)D3 SERPL-MCNC: 85.1 NG/ML (ref 30–100)

## 2021-06-24 ENCOUNTER — OFFICE VISIT (OUTPATIENT)
Dept: NEPHROLOGY | Age: 76
End: 2021-06-24
Payer: MEDICARE

## 2021-06-24 VITALS
BODY MASS INDEX: 34.87 KG/M2 | DIASTOLIC BLOOD PRESSURE: 71 MMHG | HEART RATE: 49 BPM | WEIGHT: 200 LBS | SYSTOLIC BLOOD PRESSURE: 148 MMHG

## 2021-06-24 DIAGNOSIS — N17.9 AKI (ACUTE KIDNEY INJURY) (HCC): ICD-10-CM

## 2021-06-24 DIAGNOSIS — N18.4 CKD (CHRONIC KIDNEY DISEASE) STAGE 4, GFR 15-29 ML/MIN (HCC): Primary | ICD-10-CM

## 2021-06-24 DIAGNOSIS — I10 HYPERTENSION, ESSENTIAL: ICD-10-CM

## 2021-06-24 LAB
ALBUMIN MFR UR ELPH: 69.6 %
ALPHA1 GLOB MFR UR ELPH: 3.1 %
ALPHA2 GLOB 24H MFR UR ELPH: 5.9 %
B-GLOBULIN 24H MFR UR ELPH: 9.7 %
GAMMA GLOB 24H MFR UR ELPH: 11.9 %
M PROTEIN MFR UR ELPH: NORMAL %
PLEASE NOTE:, 133800: NORMAL
PROT UR-MCNC: 33.8 MG/DL

## 2021-06-24 PROCEDURE — G8753 SYS BP > OR = 140: HCPCS | Performed by: INTERNAL MEDICINE

## 2021-06-24 PROCEDURE — 99214 OFFICE O/P EST MOD 30 MIN: CPT | Performed by: INTERNAL MEDICINE

## 2021-06-24 PROCEDURE — G8536 NO DOC ELDER MAL SCRN: HCPCS | Performed by: INTERNAL MEDICINE

## 2021-06-24 PROCEDURE — G8510 SCR DEP NEG, NO PLAN REQD: HCPCS | Performed by: INTERNAL MEDICINE

## 2021-06-24 PROCEDURE — G8754 DIAS BP LESS 90: HCPCS | Performed by: INTERNAL MEDICINE

## 2021-06-24 PROCEDURE — G8417 CALC BMI ABV UP PARAM F/U: HCPCS | Performed by: INTERNAL MEDICINE

## 2021-06-24 PROCEDURE — G8399 PT W/DXA RESULTS DOCUMENT: HCPCS | Performed by: INTERNAL MEDICINE

## 2021-06-24 PROCEDURE — G8427 DOCREV CUR MEDS BY ELIG CLIN: HCPCS | Performed by: INTERNAL MEDICINE

## 2021-06-24 NOTE — PROGRESS NOTES
Stefanie Vigil presents today for   Chief Complaint   Patient presents with    Follow-up     ERI       Is someone accompanying this pt? no  Is the patient using any DME equipment during OV? no    Depression Screening:  3 most recent PHQ Screens 6/24/2021   Little interest or pleasure in doing things Not at all   Feeling down, depressed, irritable, or hopeless Not at all   Total Score PHQ 2 0       Learning Assessment:  Learning Assessment 9/21/2020   PRIMARY LEARNER Patient   HIGHEST LEVEL OF EDUCATION - PRIMARY LEARNER  GRADUATED HIGH SCHOOL OR GED   BARRIERS PRIMARY LEARNER NONE   CO-LEARNER CAREGIVER No   PRIMARY LANGUAGE ENGLISH   LEARNER PREFERENCE PRIMARY DEMONSTRATION   ANSWERED BY patient   RELATIONSHIP SELF       Fall Risk  Fall Risk Assessment, last 12 mths 6/24/2021   Able to walk? Yes   Fall in past 12 months? 0   Do you feel unsteady? 0   Are you worried about falling 0       Coordination of Care:  1. Have you been to the ER, urgent care clinic since your last visit? Hospitalized since your last visit? no    2. Have you seen or consulted any other health care providers outside of the [address] [place] [address] since your last visit? Include any pap smears or colon screening.  Yes, [facility]

## 2021-06-24 NOTE — PROGRESS NOTES
Renal Consultation Note    NAME:  Juan Alberto Nuñez   :   1945   MRN:   [id number]     ATTENDING: No admitting provider for patient encounter. PCP:  Rod Street MD    Date/Time:  2021 12:58 PM      Subjective:    CC : Patient is here for follow-up of her CKD 4  HPI patient is here for follow-up today. She denies any complaints. Blood pressure is very well controlled at home in the 130s range after amlodipine dose was increased last time. She is now on Lasix which has resolved her swelling. Creatinine is stable at 2.4. She has discontinued taking diclofenac and Aleve at home. Taking Tylenol for pain which seems to be working okay    Past Medical History:   Diagnosis Date    Edema of foot 2016    Hyperlipidemia 2015    Hypertension, essential 2016    Menopause     Osteoarthritis of right knee 2019    Pelvic mass 2016    Urinary incontinence 2016      Past Surgical History:   Procedure Laterality Date    HX COLONOSCOPY  2011    DIVERTICULA IN THE LEFT COLON- RECOMMENDED 5 YEAR F/U    HX HYSTERECTOMY  UNKNOWN    PARTIAL- STILL HAS OVARIES     Social History     Tobacco Use    Smoking status: Never Smoker    Smokeless tobacco: Never Used   Substance Use Topics    Alcohol use: Not on file      Family History   Problem Relation Age of Onset    Breast Cancer Sister     Kidney Disease Brother         KIDNEY TRANSPLANT    Arthritis-osteo Son         MENTALLY DELAYED       No Known Allergies   Prior to Admission medications    Medication Sig Start Date End Date Taking? Authorizing Provider   furosemide (LASIX) 40 mg tablet Take 1 Tab by mouth daily as needed (swelling). 21  Yes Rohith Mendieta MD   cholecalciferol (Vitamin D3) (5000 Units/125 mcg) tab tablet Take 5,000 Units by mouth daily. Yes Other, MD Janene   aspirin delayed-release 81 mg tablet Take 81 mg by mouth daily.    Yes Art, MD Janene   amLODIPine (NORVASC) 5 mg tablet Take 1 Tab by mouth daily for 90 days. 3/30/21 6/28/21 Yes Veena Street MD   atenoloL (TENORMIN) 50 mg tablet TAKE 1 TABLET TWICE A DAY  (NEW DIRECTIONS) 3/2/21  Yes Veena Street MD   atorvastatin (LIPITOR) 40 mg tablet TAKE 1 TABLET DAILY 9/10/20  Yes Veena Street MD       REVIEW OF SYSTEMS:       Constitutional: Negative for chills and fever. HENT: Negative. Eyes: Negative. Respiratory: Negative. Cardiovascular: Negative. Gastrointestinal: Negative for abdominal pain and nausea. Skin: Negative. Neurological: Negative. Objective:   VITALS:    Visit Vitals  BP (!) 148/71 (BP 1 Location: Right arm, BP Patient Position: Sitting) Comment (BP Patient Position): per dr. Keshav Serrano this readind is better   Pulse (!) 49   Wt 90.7 kg (200 lb)   BMI 34.87 kg/m²     @TMAX(24)@    PHYSICAL EXAM:     General: NAD, alert, cooperative  Eyes: sclera anicteric  Oral Cavity: No thrush or ulcers  Neck: no JVD  Chest: Fair bilateral air entry. No Wheezing or Rhonchi. No rales.   Heart: normal sounds  Abdomen: soft and non tender   : no anne  Lower Extremities: 1+ pitting edema both legs   Skin: no rash  Neuro: intact, no focals  Psychiatric: non-depressed          LAB DATA REVIEWED:    06/22/21 0336  VITAMIN D, 25 HYDROXY   Collected: 06/21/21 1005  Final result  Specimen: Serum    Vitamin D 25-Hydroxy 85.1 ng/mL             06/21/21 1400  URINALYSIS W/MICROSCOPIC   Collected: 06/21/21 1005  Final result  Specimen: Urine    Color Yellow/Straw    Blood SmallAbnormal      Appearance HazyAbnormal    Urobilinogen 0.2 EU/dL   Specific gravity 1.015   Nitrites Negative     pH (UA) 5.0   Leukocyte Esterase ModerateAbnormal      Protein 30Abnormal  mg/dL WBC  /hpf   Glucose Negative mg/dL RBC 0-5 /hpf   Ketone Negative mg/dL Epithelial cells Moderate /lpf    Bilirubin Negative   Bacteria 2+Abnormal  /hpf          06/21/21 1234  RENAL FUNCTION PANEL   Collected: 06/21/21 1005  Final result  Specimen: Serum or Plasma    Sodium 138 mmol/L Creatinine 2. 40High  mg/dL   Potassium 3.7 mmol/L BUN/Creatinine ratio 19     Chloride 103 mmol/L GFR est AA 24 ml/min/1.73m2   CO2 23 mmol/L GFR est non-AA 20 ml/min/1.73m2    Anion gap 12 mmol/L Calcium 9.4 mg/dL   Glucose 123High  mg/dL Phosphorus 4.3 mg/dL   BUN 46High  mg/dL Albumin 3.6 g/dL          06/21/21 1130  PROTEIN URINE, RANDOM   Collected: 06/21/21 1021  Final result  Specimen: Urine, random    Protein, urine random 39High  mg/dL            06/21/21 1130  PROTEIN/CREATININE RATIO, URINE   Collected: 06/21/21 1003  Final result  Specimen: Urine, random    Protein, urine random 40High  mg/dL Protein/Creat. 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 Advised her to discontinue all NSAIDs from here on  -Renal ultrasound shows bilateral cysts which are simple. No need for further work-up. Increased echogenicity seen consistent with medical renal disease  -Her losartan was discontinued recently because of elevated creatinine. She has minimal proteinuria. I will continue to hold  -Urine is bland.   Only 0.3 g proteinuria per day  -Check urine and serum electrophoresis  -Follow-up on renal function in 6 weeks    #2 hypertension  -Blood pressure is slightly elevated in the office today but she reports in the 120-1 30 range at home  -She is on Norvasc 5 mg daily atenolol 50 mg daily which I will continue. Continue Lasix 40 mg. Since her edema has resolved I have asked her to change it to as needed only  -Advised her to be on a low-salt diet which she is not compliant with yet  -I have asked her to call me if the blood pressure is beyond parameters at home    #3 renal osteodystrophy  -Ending intact parathyroid hormone . normal 25-hydroxy vitamin D level  -Calcium and phosphorus are okay  #4 anemia  -hemoglobin is 10.9. Likely anemia of chronic kidney disease.    -No need for MAJO agents yet.   I will check iron studies with next labs      Thank you for the referral.  I will see her back in the office in 3 months with preclinic labs    ________________________________________________________________________  Signed: Jana Moeller MD

## 2021-08-29 RX ORDER — ATORVASTATIN CALCIUM 40 MG/1
TABLET, FILM COATED ORAL
Qty: 90 TABLET | Refills: 3 | Status: SHIPPED | OUTPATIENT
Start: 2021-08-29 | End: 2022-09-01 | Stop reason: SDUPTHER

## 2021-09-24 DIAGNOSIS — I10 HYPERTENSION, ESSENTIAL: ICD-10-CM

## 2021-09-24 DIAGNOSIS — N18.4 CKD (CHRONIC KIDNEY DISEASE) STAGE 4, GFR 15-29 ML/MIN (HCC): ICD-10-CM

## 2021-09-24 DIAGNOSIS — N17.9 AKI (ACUTE KIDNEY INJURY) (HCC): ICD-10-CM

## 2021-09-30 ENCOUNTER — HOSPITAL ENCOUNTER (OUTPATIENT)
Dept: LAB | Age: 76
Discharge: HOME | End: 2021-09-30
Payer: MEDICARE

## 2021-09-30 DIAGNOSIS — N18.4 CKD (CHRONIC KIDNEY DISEASE) STAGE 4, GFR 15-29 ML/MIN (HCC): ICD-10-CM

## 2021-09-30 DIAGNOSIS — N17.9 AKI (ACUTE KIDNEY INJURY) (HCC): ICD-10-CM

## 2021-09-30 DIAGNOSIS — I10 HYPERTENSION, ESSENTIAL: ICD-10-CM

## 2021-09-30 LAB
ALBUMIN SERPL-MCNC: 3.8 G/DL (ref 3.5–4.7)
ANION GAP SERPL CALC-SCNC: 12 MMOL/L
APPEARANCE UR: ABNORMAL
BACTERIA URNS QL MICRO: ABNORMAL /HPF
BILIRUB UR QL: NEGATIVE
BUN SERPL-MCNC: 40 MG/DL (ref 9–21)
BUN/CREAT SERPL: 17
CA-I BLD-MCNC: 9.5 MG/DL (ref 8.5–10.5)
CA-I BLD-MCNC: 9.8 MG/DL (ref 8.5–10.1)
CHLORIDE SERPL-SCNC: 102 MMOL/L (ref 94–111)
CO2 SERPL-SCNC: 22 MMOL/L (ref 21–33)
COLOR UR: YELLOW
CREAT SERPL-MCNC: 2.4 MG/DL (ref 0.7–1.2)
CREAT UR-MCNC: 140.7 MG/DL (ref 22–392)
EPITH CASTS URNS QL MICRO: ABNORMAL /LPF (ref 0–20)
GLUCOSE SERPL-MCNC: 117 MG/DL (ref 70–110)
GLUCOSE UR STRIP.AUTO-MCNC: NEGATIVE MG/DL
HGB UR QL STRIP: NEGATIVE
KETONES UR QL STRIP.AUTO: NEGATIVE MG/DL
LEUKOCYTE ESTERASE UR QL STRIP.AUTO: ABNORMAL
NITRITE UR QL STRIP.AUTO: NEGATIVE
PH UR STRIP: 5 [PH] (ref 5–8)
PHOSPHATE SERPL-MCNC: 3.4 MG/DL (ref 2.5–4.5)
POTASSIUM SERPL-SCNC: 3.9 MMOL/L (ref 3.2–5.1)
PROT UR STRIP-MCNC: 30 MG/DL
PROT UR-MCNC: 34 MG/DL (ref 6–10)
PROT/CREAT UR-RTO: 0.2
PTH-INTACT SERPL-MCNC: 64.7 PG/ML (ref 18.4–88)
RBC #/AREA URNS HPF: ABNORMAL /HPF (ref 0–2)
SODIUM SERPL-SCNC: 136 MMOL/L (ref 135–145)
SP GR UR REFRACTOMETRY: 1.01 (ref 1–1.03)
UROBILINOGEN UR QL STRIP.AUTO: 0.2 EU/DL (ref 0.2–1)
WBC URNS QL MICRO: ABNORMAL /HPF (ref 0–4)

## 2021-09-30 PROCEDURE — 83970 ASSAY OF PARATHORMONE: CPT

## 2021-09-30 PROCEDURE — 81001 URINALYSIS AUTO W/SCOPE: CPT

## 2021-09-30 PROCEDURE — 84156 ASSAY OF PROTEIN URINE: CPT

## 2021-09-30 PROCEDURE — 80069 RENAL FUNCTION PANEL: CPT

## 2021-10-07 ENCOUNTER — OFFICE VISIT (OUTPATIENT)
Dept: NEPHROLOGY | Age: 76
End: 2021-10-07
Payer: MEDICARE

## 2021-10-07 VITALS
BODY MASS INDEX: 34.87 KG/M2 | DIASTOLIC BLOOD PRESSURE: 63 MMHG | WEIGHT: 200 LBS | HEART RATE: 47 BPM | SYSTOLIC BLOOD PRESSURE: 151 MMHG

## 2021-10-07 DIAGNOSIS — N17.9 AKI (ACUTE KIDNEY INJURY) (HCC): ICD-10-CM

## 2021-10-07 DIAGNOSIS — I10 HYPERTENSION, ESSENTIAL: ICD-10-CM

## 2021-10-07 DIAGNOSIS — N18.4 CKD (CHRONIC KIDNEY DISEASE) STAGE 4, GFR 15-29 ML/MIN (HCC): Primary | ICD-10-CM

## 2021-10-07 PROCEDURE — G8510 SCR DEP NEG, NO PLAN REQD: HCPCS | Performed by: INTERNAL MEDICINE

## 2021-10-07 PROCEDURE — G8417 CALC BMI ABV UP PARAM F/U: HCPCS | Performed by: INTERNAL MEDICINE

## 2021-10-07 PROCEDURE — G8536 NO DOC ELDER MAL SCRN: HCPCS | Performed by: INTERNAL MEDICINE

## 2021-10-07 PROCEDURE — G8399 PT W/DXA RESULTS DOCUMENT: HCPCS | Performed by: INTERNAL MEDICINE

## 2021-10-07 PROCEDURE — G8427 DOCREV CUR MEDS BY ELIG CLIN: HCPCS | Performed by: INTERNAL MEDICINE

## 2021-10-07 PROCEDURE — G8754 DIAS BP LESS 90: HCPCS | Performed by: INTERNAL MEDICINE

## 2021-10-07 PROCEDURE — G8753 SYS BP > OR = 140: HCPCS | Performed by: INTERNAL MEDICINE

## 2021-10-07 PROCEDURE — 99214 OFFICE O/P EST MOD 30 MIN: CPT | Performed by: INTERNAL MEDICINE

## 2021-10-07 RX ORDER — CIPROFLOXACIN 250 MG/1
250 TABLET, FILM COATED ORAL DAILY
Qty: 3 TABLET | Refills: 0 | Status: SHIPPED | OUTPATIENT
Start: 2021-10-07 | End: 2021-10-10

## 2021-10-07 RX ORDER — AMLODIPINE BESYLATE 10 MG/1
10 TABLET ORAL DAILY
Qty: 90 TABLET | Refills: 2 | Status: SHIPPED | OUTPATIENT
Start: 2021-10-07 | End: 2022-08-03

## 2021-10-07 NOTE — PROGRESS NOTES
Malaika Dobbins presents today for   Chief Complaint   Patient presents with    Follow-up     CKD       Is someone accompanying this pt? no    Is the patient using any DME equipment during OV? no    Depression Screening:  3 most recent PHQ Screens 10/7/2021   Little interest or pleasure in doing things Not at all   Feeling down, depressed, irritable, or hopeless Not at all   Total Score PHQ 2 0       Learning Assessment:  Learning Assessment 9/21/2020   PRIMARY LEARNER Patient   HIGHEST LEVEL OF EDUCATION - PRIMARY LEARNER  GRADUATED HIGH SCHOOL OR GED   BARRIERS PRIMARY LEARNER NONE   CO-LEARNER CAREGIVER No   PRIMARY LANGUAGE ENGLISH   LEARNER PREFERENCE PRIMARY DEMONSTRATION   ANSWERED BY patient   RELATIONSHIP SELF       Fall Risk  Fall Risk Assessment, last 12 mths 10/7/2021   Able to walk? Yes   Fall in past 12 months? 0   Do you feel unsteady? 0   Are you worried about falling 0       Coordination of Care:  1. Have you been to the ER, urgent care clinic since your last visit? Hospitalized since your last visit? no    2. Have you seen or consulted any other health care providers outside of the [address] [place] [address] since your last visit? Include any pap smears or colon screening.  Yes, [facility]

## 2021-10-07 NOTE — PROGRESS NOTES
Renal Consultation Note    NAME:  Liliana Pierre   :   1945   MRN:   [id number]     ATTENDING: No admitting provider for patient encounter. PCP:  Kalia Street MD    Date/Time:  10/7/2021 12:58 PM      Subjective:    CC : Patient is here for follow-up of her CKD 4  HPI patient is here for follow-up today. She denies any complaints. Blood pressure is very well controlled at home in the 130s range after amlodipine dose was increased last time. She is now on Lasix which has resolved her swelling. She is taking it about 4 times a week  Blood pressure is elevated in the office today at 151/63. Same range at home  creatinine is stable at 2.4. She has discontinued taking diclofenac and Aleve at home. Taking Tylenol for pain which seems to be working okay    Past Medical History:   Diagnosis Date    Edema of foot 2016    Hyperlipidemia 2015    Hypertension, essential 2016    Menopause     Osteoarthritis of right knee 2019    Pelvic mass 2016    Urinary incontinence 2016      Past Surgical History:   Procedure Laterality Date    HX COLONOSCOPY  2011    DIVERTICULA IN THE LEFT COLON- RECOMMENDED 5 YEAR F/U    HX HYSTERECTOMY  UNKNOWN    PARTIAL- STILL HAS OVARIES     Social History     Tobacco Use    Smoking status: Never Smoker    Smokeless tobacco: Never Used   Substance Use Topics    Alcohol use: Not on file      Family History   Problem Relation Age of Onset    Breast Cancer Sister     Kidney Disease Brother         KIDNEY TRANSPLANT    Arthritis-osteo Son         MENTALLY DELAYED       No Known Allergies   Prior to Admission medications    Medication Sig Start Date End Date Taking? Authorizing Provider   atorvastatin (LIPITOR) 40 mg tablet TAKE 1 TABLET DAILY 21  Yes Kalia Street MD   furosemide (LASIX) 40 mg tablet Take 1 Tab by mouth daily as needed (swelling).  21  Yes Willy Gupta MD   cholecalciferol (Vitamin D3) (5000 Units/125 mcg) tab tablet Take 5,000 Units by mouth daily. Yes Other, MD Janene   aspirin delayed-release 81 mg tablet Take 81 mg by mouth daily. Yes Art, MD Janene   atenoloL (TENORMIN) 50 mg tablet TAKE 1 TABLET TWICE A DAY  (NEW DIRECTIONS) 3/2/21  Yes Sarita Street MD       REVIEW OF SYSTEMS:       Constitutional: Negative for chills and fever. HENT: Negative. Eyes: Negative. Respiratory: Negative. Cardiovascular: Negative. Gastrointestinal: Negative for abdominal pain and nausea. Skin: Negative. Neurological: Negative. Objective:   VITALS:    Visit Vitals  BP (!) 151/63 (BP 1 Location: Left arm, BP Patient Position: Sitting)   Pulse (!) 47   Wt 90.7 kg (200 lb)   BMI 34.87 kg/m²     @TMAX(24)@    PHYSICAL EXAM:     General: NAD, alert, cooperative  Eyes: sclera anicteric  Oral Cavity: No thrush or ulcers  Neck: no JVD  Chest: Fair bilateral air entry. No Wheezing or Rhonchi. No rales. Heart: normal sounds  Abdomen: soft and non tender   : no anne  Lower Extremities: 1+ pitting edema both legs   Skin: no rash  Neuro: intact, no focals  Psychiatric: non-depressed          LAB DATA REVIEWED:     PTH INTACT   Collected: 09/30/21 0945  Final result  Specimen: Serum     Calcium 9.8 mg/dL PTH, Intact 64.7 pg/mL          09/30/21 1206  URINALYSIS W/MICROSCOPIC   Collected: 09/30/21 0945  Final result  Specimen: Urine    Color Yellow    Blood Negative     Appearance Cloudy   Urobilinogen 0.2 EU/dL   Specific gravity 1.014   Nitrites Negative     pH (UA) 5.0   Leukocyte Esterase LargeAbnormal      Protein 30Abnormal  mg/dL WBC  /hpf   Glucose Negative mg/dL RBC 0-5 /hpf   Ketone Negative mg/dL Epithelial cells Moderate /lpf    Bilirubin Negative   Bacteria 3+Abnormal  /hpf          09/30/21 1126  PROTEIN/CREATININE RATIO, URINE   Collected: 09/30/21 8406  Final result  Specimen: Urine, random    Protein, urine random 34High  mg/dL Protein/Creat.  urine Ratio 0.2   Creatinine, urine 140.70 mg/dL             09/30/21 1126  RENAL FUNCTION PANEL   Collected: 09/30/21 0945  Final result  Specimen: Serum or Plasma    Sodium 136 mmol/L Creatinine 2. 40High  mg/dL   Potassium 3.9 mmol/L BUN/Creatinine ratio 17     Chloride 102 mmol/L GFR est AA 24 ml/min/1.73m2   CO2 22 mmol/L GFR est non-AA 20 ml/min/1.73m2    Anion gap 12 mmol/L Calcium 9.5 mg/dL   Glucose 117High  mg/dL Phosphorus 3.4 mg/dL   BUN 40High  mg/dL Albumin 3.8 g/dL              06/22/21 0336  VITAMIN D, 25 HYDROXY   Collected: 06/21/21 1005  Final result  Specimen: Serum    Vitamin D 25-Hydroxy 85.1 ng/mL             06/21/21 1400  URINALYSIS W/MICROSCOPIC   Collected: 06/21/21 1005  Final result  Specimen: Urine    Color Yellow/Straw    Blood SmallAbnormal      Appearance HazyAbnormal    Urobilinogen 0.2 EU/dL   Specific gravity 1.015   Nitrites Negative     pH (UA) 5.0   Leukocyte Esterase ModerateAbnormal      Protein 30Abnormal  mg/dL WBC  /hpf   Glucose Negative mg/dL RBC 0-5 /hpf   Ketone Negative mg/dL Epithelial cells Moderate /lpf    Bilirubin Negative   Bacteria 2+Abnormal  /hpf          06/21/21 1234  RENAL FUNCTION PANEL   Collected: 06/21/21 1005  Final result  Specimen: Serum or Plasma    Sodium 138 mmol/L Creatinine 2. 40High  mg/dL   Potassium 3.7 mmol/L BUN/Creatinine ratio 19     Chloride 103 mmol/L GFR est AA 24 ml/min/1.73m2   CO2 23 mmol/L GFR est non-AA 20 ml/min/1.73m2    Anion gap 12 mmol/L Calcium 9.4 mg/dL   Glucose 123High  mg/dL Phosphorus 4.3 mg/dL   BUN 46High  mg/dL Albumin 3.6 g/dL          06/21/21 1130  PROTEIN URINE, RANDOM   Collected: 06/21/21 1021  Final result  Specimen: Urine, random    Protein, urine random 39High  mg/dL            06/21/21 1130  PROTEIN/CREATININE RATIO, URINE   Collected: 06/21/21 1003  Final result  Specimen: Urine, random    Protein, urine random 40High  mg/dL Protein/Creat.  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 Advised her to discontinue all NSAIDs from here on  -Renal ultrasound shows bilateral cysts which are simple. No need for further work-up. Increased echogenicity seen consistent with medical renal disease  -Her losartan was discontinued 2/2 erik. She has minimal proteinuria. I will continue to hold  -Urine is bland. Only 0.3 g proteinuria per day  -Check urine and serum electrophoresis  -Follow-up on renal function in 6 weeks    #2 hypertension  -Blood pressure is slightly elevated in the office today . Same range at home  -She is on Norvasc 5 mg daily atenolol 50 mg daily which I will continue. I will increase Norvasc to 10 mg daily   -continue Lasix 40 mg as needed only  -Advised her to be on a low-salt diet which she is not compliant with yet  -I have asked her to call me if the blood pressure is beyond parameters at home    #3 renal osteodystrophy  -Within target intact parathyroid hormone . normal 25-hydroxy vitamin D level  -Calcium and phosphorus are okay    #4 anemia  -hemoglobin is 10.9. Likely anemia of chronic kidney disease.    -No need for MAJO agents yet.   I will check iron studies with next labs      Thank you for the referral.  I will see her back in the office in 3 months with preclinic labs    ________________________________________________________________________  Signed: Natalie Major, MD

## 2021-10-12 ENCOUNTER — TELEPHONE (OUTPATIENT)
Dept: INTERNAL MEDICINE CLINIC | Age: 76
End: 2021-10-12

## 2021-10-12 ENCOUNTER — CLINICAL SUPPORT (OUTPATIENT)
Dept: INTERNAL MEDICINE CLINIC | Age: 76
End: 2021-10-12
Payer: MEDICARE

## 2021-10-12 DIAGNOSIS — Z23 ENCOUNTER FOR IMMUNIZATION: ICD-10-CM

## 2021-10-12 DIAGNOSIS — Z23 NEEDS FLU SHOT: Primary | ICD-10-CM

## 2021-10-12 DIAGNOSIS — Z12.31 ENCOUNTER FOR SCREENING MAMMOGRAM FOR BREAST CANCER: Primary | ICD-10-CM

## 2021-10-12 PROCEDURE — 90694 VACC AIIV4 NO PRSRV 0.5ML IM: CPT | Performed by: INTERNAL MEDICINE

## 2021-10-12 PROCEDURE — G0008 ADMIN INFLUENZA VIRUS VAC: HCPCS | Performed by: INTERNAL MEDICINE

## 2021-12-02 ENCOUNTER — HOSPITAL ENCOUNTER (OUTPATIENT)
Dept: MAMMOGRAPHY | Age: 76
Discharge: HOME | End: 2021-12-02
Attending: INTERNAL MEDICINE
Payer: MEDICARE

## 2021-12-02 DIAGNOSIS — Z12.31 ENCOUNTER FOR SCREENING MAMMOGRAM FOR BREAST CANCER: ICD-10-CM

## 2021-12-02 PROCEDURE — 77063 BREAST TOMOSYNTHESIS BI: CPT

## 2021-12-29 RX ORDER — FUROSEMIDE 40 MG/1
40 TABLET ORAL
Qty: 30 TABLET | Refills: 0 | Status: SHIPPED | OUTPATIENT
Start: 2021-12-29 | End: 2022-03-24 | Stop reason: SDUPTHER

## 2022-01-07 DIAGNOSIS — I10 HYPERTENSION, ESSENTIAL: ICD-10-CM

## 2022-01-07 DIAGNOSIS — N18.4 CKD (CHRONIC KIDNEY DISEASE) STAGE 4, GFR 15-29 ML/MIN (HCC): ICD-10-CM

## 2022-01-07 DIAGNOSIS — N17.9 AKI (ACUTE KIDNEY INJURY) (HCC): ICD-10-CM

## 2022-01-14 ENCOUNTER — HOSPITAL ENCOUNTER (OUTPATIENT)
Dept: LAB | Age: 77
Discharge: HOME | End: 2022-01-14
Payer: MEDICARE

## 2022-01-14 DIAGNOSIS — N18.4 CKD (CHRONIC KIDNEY DISEASE) STAGE 4, GFR 15-29 ML/MIN (HCC): ICD-10-CM

## 2022-01-14 DIAGNOSIS — I10 HYPERTENSION, ESSENTIAL: ICD-10-CM

## 2022-01-14 DIAGNOSIS — N17.9 AKI (ACUTE KIDNEY INJURY) (HCC): ICD-10-CM

## 2022-01-14 LAB
ALBUMIN SERPL-MCNC: 3.2 G/DL (ref 3.5–4.7)
ANION GAP SERPL CALC-SCNC: 12 MMOL/L
APPEARANCE UR: ABNORMAL
BACTERIA URNS QL MICRO: ABNORMAL /HPF
BILIRUB UR QL: NEGATIVE
BUN SERPL-MCNC: 42 MG/DL (ref 9–21)
BUN/CREAT SERPL: 18
CA-I BLD-MCNC: 9.3 MG/DL (ref 8.5–10.5)
CA-I BLD-MCNC: 9.5 MG/DL (ref 8.5–10.1)
CHLORIDE SERPL-SCNC: 104 MMOL/L (ref 94–111)
CO2 SERPL-SCNC: 23 MMOL/L (ref 21–33)
COLOR UR: YELLOW
CREAT SERPL-MCNC: 2.3 MG/DL (ref 0.7–1.2)
CREAT UR-MCNC: 129.7 MG/DL (ref 22–392)
EPITH CASTS URNS QL MICRO: ABNORMAL /LPF (ref 0–20)
GLUCOSE SERPL-MCNC: 110 MG/DL (ref 70–110)
GLUCOSE UR STRIP.AUTO-MCNC: NEGATIVE MG/DL
HGB UR QL STRIP: ABNORMAL
KETONES UR QL STRIP.AUTO: NEGATIVE MG/DL
LEUKOCYTE ESTERASE UR QL STRIP.AUTO: ABNORMAL
NITRITE UR QL STRIP.AUTO: NEGATIVE
PH UR STRIP: 5 [PH] (ref 5–8)
PHOSPHATE SERPL-MCNC: 3.9 MG/DL (ref 2.5–4.5)
POTASSIUM SERPL-SCNC: 4.1 MMOL/L (ref 3.2–5.1)
PROT UR STRIP-MCNC: 30 MG/DL
PROT UR-MCNC: 39 MG/DL (ref 6–10)
PROT/CREAT UR-RTO: 0.3
PTH-INTACT SERPL-MCNC: 73 PG/ML (ref 18.4–88)
RBC #/AREA URNS HPF: ABNORMAL /HPF (ref 0–2)
SODIUM SERPL-SCNC: 139 MMOL/L (ref 135–145)
SP GR UR REFRACTOMETRY: 1.01 (ref 1–1.03)
UROBILINOGEN UR QL STRIP.AUTO: 0.2 EU/DL (ref 0.2–1)
WBC URNS QL MICRO: ABNORMAL /HPF (ref 0–4)

## 2022-01-14 PROCEDURE — 80069 RENAL FUNCTION PANEL: CPT

## 2022-01-14 PROCEDURE — 84156 ASSAY OF PROTEIN URINE: CPT

## 2022-01-14 PROCEDURE — 83970 ASSAY OF PARATHORMONE: CPT

## 2022-01-14 PROCEDURE — 81001 URINALYSIS AUTO W/SCOPE: CPT

## 2022-01-20 ENCOUNTER — HOSPITAL ENCOUNTER (OUTPATIENT)
Dept: VASCULAR SURGERY | Age: 77
Discharge: HOME | End: 2022-01-20
Attending: INTERNAL MEDICINE
Payer: MEDICARE

## 2022-01-20 ENCOUNTER — OFFICE VISIT (OUTPATIENT)
Dept: NEPHROLOGY | Age: 77
End: 2022-01-20
Payer: MEDICARE

## 2022-01-20 VITALS
DIASTOLIC BLOOD PRESSURE: 68 MMHG | SYSTOLIC BLOOD PRESSURE: 149 MMHG | WEIGHT: 200 LBS | HEART RATE: 58 BPM | BODY MASS INDEX: 34.87 KG/M2

## 2022-01-20 DIAGNOSIS — N18.4 CKD (CHRONIC KIDNEY DISEASE) STAGE 4, GFR 15-29 ML/MIN (HCC): ICD-10-CM

## 2022-01-20 DIAGNOSIS — N17.9 AKI (ACUTE KIDNEY INJURY) (HCC): ICD-10-CM

## 2022-01-20 DIAGNOSIS — I10 HYPERTENSION, ESSENTIAL: ICD-10-CM

## 2022-01-20 DIAGNOSIS — N17.9 AKI (ACUTE KIDNEY INJURY) (HCC): Primary | ICD-10-CM

## 2022-01-20 PROCEDURE — G8510 SCR DEP NEG, NO PLAN REQD: HCPCS | Performed by: INTERNAL MEDICINE

## 2022-01-20 PROCEDURE — G8753 SYS BP > OR = 140: HCPCS | Performed by: INTERNAL MEDICINE

## 2022-01-20 PROCEDURE — G8536 NO DOC ELDER MAL SCRN: HCPCS | Performed by: INTERNAL MEDICINE

## 2022-01-20 PROCEDURE — G8427 DOCREV CUR MEDS BY ELIG CLIN: HCPCS | Performed by: INTERNAL MEDICINE

## 2022-01-20 PROCEDURE — 99214 OFFICE O/P EST MOD 30 MIN: CPT | Performed by: INTERNAL MEDICINE

## 2022-01-20 PROCEDURE — G8399 PT W/DXA RESULTS DOCUMENT: HCPCS | Performed by: INTERNAL MEDICINE

## 2022-01-20 PROCEDURE — G8754 DIAS BP LESS 90: HCPCS | Performed by: INTERNAL MEDICINE

## 2022-01-20 PROCEDURE — 93971 EXTREMITY STUDY: CPT

## 2022-01-20 PROCEDURE — G8417 CALC BMI ABV UP PARAM F/U: HCPCS | Performed by: INTERNAL MEDICINE

## 2022-01-20 RX ORDER — CEPHALEXIN 500 MG/1
500 CAPSULE ORAL 2 TIMES DAILY
Qty: 14 CAPSULE | Refills: 0 | Status: SHIPPED | OUTPATIENT
Start: 2022-01-20 | End: 2022-03-24 | Stop reason: SDUPTHER

## 2022-01-20 NOTE — PROGRESS NOTES
Diane Raman presents today for   Chief Complaint   Patient presents with    Follow-up     CKD       Is someone accompanying this pt? No    Is the patient using any DME equipment during OV? no    Depression Screening:  3 most recent PHQ Screens 1/20/2022   Little interest or pleasure in doing things Not at all   Feeling down, depressed, irritable, or hopeless Not at all   Total Score PHQ 2 0       Learning Assessment:  Learning Assessment 9/21/2020   PRIMARY LEARNER Patient   HIGHEST LEVEL OF EDUCATION - PRIMARY LEARNER  GRADUATED HIGH SCHOOL OR GED   BARRIERS PRIMARY LEARNER NONE   CO-LEARNER CAREGIVER No   PRIMARY LANGUAGE ENGLISH   LEARNER PREFERENCE PRIMARY DEMONSTRATION   ANSWERED BY patient   RELATIONSHIP SELF       Fall Risk  Fall Risk Assessment, last 12 mths 1/20/2022   Able to walk? Yes   Fall in past 12 months? 0   Do you feel unsteady? 0   Are you worried about falling 0       Coordination of Care:  1. Have you been to the ER, urgent care clinic since your last visit? Hospitalized since your last visit? no    2. Have you seen or consulted any other health care providers outside of the [address] [place] [address] since your last visit? Include any pap smears or colon screening.  NO hasnt found a  New pcp yet

## 2022-01-20 NOTE — PROGRESS NOTES
Renal Consultation Note    NAME:  Gucci Rolle   :   1945   MRN:   [id number]     ATTENDING: No admitting provider for patient encounter. PCP:  None    Date/Time:  2022 12:58 PM      Subjective:    CC : Patient is here for follow-up of her CKD 4  HPI   patient is here for follow-up today. She has been having swelling in her right hand for the past 1 month. Was not able to see her PCP as Dr. Murray Aponte has now retired. Complains of pain in the right hand. No redness. Denies any tick or insect bite     blood pressure is very well controlled at home in the 130s range after amlodipine dose was increased last time. She is now on Lasix which has resolved her swelling. She is taking it about 4 times a week  Blood pressure is elevated in the office today at 151/63. Same range at home  creatinine is stable at 2.3. She has discontinued taking diclofenac and Aleve at home. Taking Tylenol for pain whicheems to be working okay    Past Medical History:   Diagnosis Date    Edema of foot 2016    Hyperlipidemia 2015    Hypertension, essential 2016    Menopause     Osteoarthritis of right knee 2019    Pelvic mass 2016    Urinary incontinence 2016      Past Surgical History:   Procedure Laterality Date    HX COLONOSCOPY  2011    DIVERTICULA IN THE LEFT COLON- RECOMMENDED 5 YEAR F/U    HX HYSTERECTOMY  UNKNOWN    PARTIAL- STILL HAS OVARIES     Social History     Tobacco Use    Smoking status: Never Smoker    Smokeless tobacco: Never Used   Substance Use Topics    Alcohol use: Not on file      Family History   Problem Relation Age of Onset    Breast Cancer Sister     Kidney Disease Brother         KIDNEY TRANSPLANT    OSTEOARTHRITIS Son         MENTALLY DELAYED       No Known Allergies   Prior to Admission medications    Medication Sig Start Date End Date Taking?  Authorizing Provider   cephALEXin (KEFLEX) 500 mg capsule Take 1 Capsule by mouth two (2) times a day. 1/20/22  Yes Gertrude Chi MD   furosemide (LASIX) 40 mg tablet Take 1 Tab by mouth daily as needed (swelling). 12/29/21  Yes Gertrude Chi MD   amLODIPine (NORVASC) 10 mg tablet Take 1 Tablet by mouth daily. 10/7/21  Yes Gertrude Chi MD   atorvastatin (LIPITOR) 40 mg tablet TAKE 1 TABLET DAILY 8/29/21  Yes Chely Street MD   cholecalciferol (Vitamin D3) (5000 Units/125 mcg) tab tablet Take 5,000 Units by mouth daily. Yes Other, MD Janene   aspirin delayed-release 81 mg tablet Take 81 mg by mouth daily. Yes Art, MD Janene   atenoloL (TENORMIN) 50 mg tablet TAKE 1 TABLET TWICE A DAY  (NEW DIRECTIONS) 3/2/21  Yes Chely Street MD       REVIEW OF SYSTEMS:       Constitutional: Negative for chills and fever. HENT: Negative. Eyes: Negative. Respiratory: Negative. Cardiovascular: Negative. Gastrointestinal: Negative for abdominal pain and nausea. Skin: Negative. Neurological: Negative. Objective:   VITALS:    Visit Vitals  BP (!) 149/68 (BP 1 Location: Right arm, BP Patient Position: Sitting)   Pulse (!) 58   Wt 90.7 kg (200 lb)   BMI 34.87 kg/m²     @TMAX(24)@    PHYSICAL EXAM:     General: NAD, alert, cooperative  Eyes: sclera anicteric  Oral Cavity: No thrush or ulcers  Neck: no JVD  Chest: Fair bilateral air entry. No Wheezing or Rhonchi. No rales. Heart: normal sounds  Abdomen: soft and non tender   : no anne  Lower Extremities: No edema in both legs . Significantly swollen right hand nonpitting edema. Nontender.   Good capillary refill   skin: no rash  Neuro: intact, no focals  Psychiatric: non-depressed          LAB DATA REVIEWED:     PTH INTACT   Collected: 09/30/21 0945  Final result  Specimen: Serum     Calcium 9.8 mg/dL PTH, Intact 64.7 pg/mL          09/30/21 1206  URINALYSIS W/MICROSCOPIC   Collected: 09/30/21 0945  Final result  Specimen: Urine    Color Yellow    Blood Negative     Appearance Cloudy   Urobilinogen 0.2 EU/dL   Specific gravity 1.014   Nitrites Negative     pH (UA) 5.0   Leukocyte Esterase LargeAbnormal      Protein 30Abnormal  mg/dL WBC  /hpf   Glucose Negative mg/dL RBC 0-5 /hpf   Ketone Negative mg/dL Epithelial cells Moderate /lpf    Bilirubin Negative   Bacteria 3+Abnormal  /hpf          09/30/21 1126  PROTEIN/CREATININE RATIO, URINE   Collected: 09/30/21 0056  Final result  Specimen: Urine, random    Protein, urine random 34High  mg/dL Protein/Creat. urine Ratio 0.2     Creatinine, urine 140.70 mg/dL             09/30/21 1126  RENAL FUNCTION PANEL   Collected: 09/30/21 0945  Final result  Specimen: Serum or Plasma    Sodium 136 mmol/L Creatinine 2. 40High  mg/dL   Potassium 3.9 mmol/L BUN/Creatinine ratio 17     Chloride 102 mmol/L GFR est AA 24 ml/min/1.73m2   CO2 22 mmol/L GFR est non-AA 20 ml/min/1.73m2    Anion gap 12 mmol/L Calcium 9.5 mg/dL   Glucose 117High  mg/dL Phosphorus 3.4 mg/dL   BUN 40High  mg/dL Albumin 3.8 g/dL              06/22/21 0336  VITAMIN D, 25 HYDROXY   Collected: 06/21/21 1005  Final result  Specimen: Serum    Vitamin D 25-Hydroxy 85.1 ng/mL             06/21/21 1400  URINALYSIS W/MICROSCOPIC   Collected: 06/21/21 1005  Final result  Specimen: Urine    Color Yellow/Straw    Blood SmallAbnormal      Appearance HazyAbnormal    Urobilinogen 0.2 EU/dL   Specific gravity 1.015   Nitrites Negative     pH (UA) 5.0   Leukocyte Esterase ModerateAbnormal      Protein 30Abnormal  mg/dL WBC  /hpf   Glucose Negative mg/dL RBC 0-5 /hpf   Ketone Negative mg/dL Epithelial cells Moderate /lpf    Bilirubin Negative   Bacteria 2+Abnormal  /hpf          06/21/21 1234  RENAL FUNCTION PANEL   Collected: 06/21/21 1005  Final result  Specimen: Serum or Plasma    Sodium 138 mmol/L Creatinine 2. 40High  mg/dL   Potassium 3.7 mmol/L BUN/Creatinine ratio 19     Chloride 103 mmol/L GFR est AA 24 ml/min/1.73m2   CO2 23 mmol/L GFR est non-AA 20 ml/min/1.73m2    Anion gap 12 mmol/L Calcium 9.4 mg/dL   Glucose 123High  mg/dL Phosphorus 4.3 mg/dL   BUN 46High  mg/dL Albumin 3.6 g/dL          06/21/21 1130  PROTEIN URINE, RANDOM   Collected: 06/21/21 1021  Final result  Specimen: Urine, random    Protein, urine random 39High  mg/dL            06/21/21 1130  PROTEIN/CREATININE RATIO, URINE   Collected: 06/21/21 1003  Final result  Specimen: Urine, random    Protein, urine random 40High  mg/dL Protein/Creat. 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 Advised her to discontinue all NSAIDs from here on  -Renal ultrasound shows bilateral cysts which are simple. No need for further work-up. Increased echogenicity seen consistent with medical renal disease  -Her losartan was discontinued 2/2 erik. She has minimal proteinuria. I will continue to hold  -Urine is suggestive of UTI. Only 0.3 g proteinuria per day  -Check urine and serum electrophoresis  -Follow-up on renal function in 6 weeks    #2 hypertension  -Blood pressure is slightly elevated in the office today . Well-controlled at home  -Increase Norvasc to 10 mg daily at the last visit which I will continue.   Also on atenolol 50 mg daily which I will continue. -continue Lasix 40 mg as needed only  -Advised her to be on a low-salt diet which she is not compliant with yet  -I have asked her to call me if the blood pressure is beyond parameters at home    #3 renal osteodystrophy  -Within target intact parathyroid hormone . normal 25-hydroxy vitamin D level  -Calcium and phosphorus are okay    #4 anemia  -hemoglobin is 10.9. Likely anemia of chronic kidney disease.    -No need for MAJO agents yet. I will check iron studies with next labs    #5 right hand swelling  -Significant swelling present in the right hand for the past month  -Does not seem to be cellulitis. Patient thinks it is gout though it seems unlikely. We will check a uric acid level  -We will order a venous Doppler stat to rule out DVT  -I am also calling in Keflex 500 twice daily for 7 days in case it is cellulitis  -I have advised her to go to the ER if swelling does not improve over the next few days with above treatment    #6 UTI  -Urine suggestive of UTI.   I will call in Keflex for her      Thank you for the referral.  I will see her back in the office in 2 months with preclinic labs    ________________________________________________________________________  Signed: Maria De Jesus Rodriguez MD

## 2022-02-18 RX ORDER — ATENOLOL 50 MG/1
TABLET ORAL
Qty: 180 TABLET | Refills: 3 | Status: SHIPPED | OUTPATIENT
Start: 2022-02-18 | End: 2022-03-24 | Stop reason: SDUPTHER

## 2022-02-18 NOTE — TELEPHONE ENCOUNTER
Patient is scheduled for an appointment with Dannielle Gannon NP on 4/8/2022. Refill request from [employer] for Atenolol.

## 2022-02-20 DIAGNOSIS — I10 HYPERTENSION, ESSENTIAL: ICD-10-CM

## 2022-02-20 DIAGNOSIS — N17.9 AKI (ACUTE KIDNEY INJURY) (HCC): ICD-10-CM

## 2022-02-20 DIAGNOSIS — N18.4 CKD (CHRONIC KIDNEY DISEASE) STAGE 4, GFR 15-29 ML/MIN (HCC): ICD-10-CM

## 2022-03-18 PROBLEM — M17.11 OSTEOARTHRITIS OF RIGHT KNEE: Status: ACTIVE | Noted: 2019-08-08

## 2022-03-18 PROBLEM — N18.9 CHRONIC KIDNEY DISEASE: Status: ACTIVE | Noted: 2021-03-01

## 2022-03-21 ENCOUNTER — HOSPITAL ENCOUNTER (OUTPATIENT)
Dept: LAB | Age: 77
Discharge: HOME | End: 2022-03-21
Payer: MEDICARE

## 2022-03-21 DIAGNOSIS — N18.4 CKD (CHRONIC KIDNEY DISEASE) STAGE 4, GFR 15-29 ML/MIN (HCC): ICD-10-CM

## 2022-03-21 DIAGNOSIS — N17.9 AKI (ACUTE KIDNEY INJURY) (HCC): ICD-10-CM

## 2022-03-21 DIAGNOSIS — I10 HYPERTENSION, ESSENTIAL: ICD-10-CM

## 2022-03-21 LAB
ALBUMIN SERPL-MCNC: 3.7 G/DL (ref 3.5–4.7)
ANION GAP SERPL CALC-SCNC: 14 MMOL/L
APPEARANCE UR: ABNORMAL
BACTERIA URNS QL MICRO: ABNORMAL /HPF
BILIRUB UR QL: NEGATIVE
BUN SERPL-MCNC: 42 MG/DL (ref 9–21)
BUN/CREAT SERPL: 16
CA-I BLD-MCNC: 9.6 MG/DL (ref 8.5–10.5)
CA-I BLD-MCNC: 9.7 MG/DL (ref 8.5–10.1)
CHLORIDE SERPL-SCNC: 105 MMOL/L (ref 94–111)
CO2 SERPL-SCNC: 19 MMOL/L (ref 21–33)
COLOR UR: YELLOW
CREAT SERPL-MCNC: 2.6 MG/DL (ref 0.7–1.2)
CREAT UR-MCNC: 129.3 MG/DL (ref 22–392)
EPITH CASTS URNS QL MICRO: ABNORMAL /LPF (ref 0–20)
GLUCOSE SERPL-MCNC: 101 MG/DL (ref 70–110)
GLUCOSE UR STRIP.AUTO-MCNC: NEGATIVE MG/DL
HGB UR QL STRIP: ABNORMAL
KETONES UR QL STRIP.AUTO: NEGATIVE MG/DL
LEUKOCYTE ESTERASE UR QL STRIP.AUTO: ABNORMAL
NITRITE UR QL STRIP.AUTO: NEGATIVE
PH UR STRIP: 5 [PH] (ref 5–8)
PHOSPHATE SERPL-MCNC: 4.5 MG/DL (ref 2.5–4.5)
POTASSIUM SERPL-SCNC: 4.4 MMOL/L (ref 3.2–5.1)
PROT UR STRIP-MCNC: 30 MG/DL
PROT UR-MCNC: 30 MG/DL (ref 6–10)
PROT/CREAT UR-RTO: 0.2
PTH-INTACT SERPL-MCNC: 89.2 PG/ML (ref 18.4–88)
RBC #/AREA URNS HPF: ABNORMAL /HPF (ref 0–2)
SODIUM SERPL-SCNC: 138 MMOL/L (ref 135–145)
SP GR UR REFRACTOMETRY: 1.01 (ref 1–1.03)
UROBILINOGEN UR QL STRIP.AUTO: 0.2 EU/DL (ref 0.2–1)
WBC URNS QL MICRO: ABNORMAL /HPF (ref 0–4)

## 2022-03-21 PROCEDURE — 83970 ASSAY OF PARATHORMONE: CPT

## 2022-03-21 PROCEDURE — 80069 RENAL FUNCTION PANEL: CPT

## 2022-03-21 PROCEDURE — 81001 URINALYSIS AUTO W/SCOPE: CPT

## 2022-03-21 PROCEDURE — 84156 ASSAY OF PROTEIN URINE: CPT

## 2022-03-24 ENCOUNTER — OFFICE VISIT (OUTPATIENT)
Dept: NEPHROLOGY | Age: 77
End: 2022-03-24
Payer: MEDICARE

## 2022-03-24 VITALS
BODY MASS INDEX: 31.77 KG/M2 | DIASTOLIC BLOOD PRESSURE: 64 MMHG | WEIGHT: 182.2 LBS | SYSTOLIC BLOOD PRESSURE: 141 MMHG | HEART RATE: 55 BPM

## 2022-03-24 DIAGNOSIS — I10 HYPERTENSION, ESSENTIAL: ICD-10-CM

## 2022-03-24 DIAGNOSIS — N17.9 AKI (ACUTE KIDNEY INJURY) (HCC): Primary | ICD-10-CM

## 2022-03-24 DIAGNOSIS — N18.4 CKD (CHRONIC KIDNEY DISEASE) STAGE 4, GFR 15-29 ML/MIN (HCC): ICD-10-CM

## 2022-03-24 DIAGNOSIS — N17.9 AKI (ACUTE KIDNEY INJURY) (HCC): ICD-10-CM

## 2022-03-24 PROCEDURE — G8536 NO DOC ELDER MAL SCRN: HCPCS | Performed by: INTERNAL MEDICINE

## 2022-03-24 PROCEDURE — G8417 CALC BMI ABV UP PARAM F/U: HCPCS | Performed by: INTERNAL MEDICINE

## 2022-03-24 PROCEDURE — G8399 PT W/DXA RESULTS DOCUMENT: HCPCS | Performed by: INTERNAL MEDICINE

## 2022-03-24 PROCEDURE — G8753 SYS BP > OR = 140: HCPCS | Performed by: INTERNAL MEDICINE

## 2022-03-24 PROCEDURE — G8427 DOCREV CUR MEDS BY ELIG CLIN: HCPCS | Performed by: INTERNAL MEDICINE

## 2022-03-24 PROCEDURE — G8510 SCR DEP NEG, NO PLAN REQD: HCPCS | Performed by: INTERNAL MEDICINE

## 2022-03-24 PROCEDURE — 99214 OFFICE O/P EST MOD 30 MIN: CPT | Performed by: INTERNAL MEDICINE

## 2022-03-24 PROCEDURE — G8754 DIAS BP LESS 90: HCPCS | Performed by: INTERNAL MEDICINE

## 2022-03-24 RX ORDER — FUROSEMIDE 40 MG/1
40 TABLET ORAL
Qty: 30 TABLET | Refills: 0 | Status: SHIPPED | OUTPATIENT
Start: 2022-03-24 | End: 2022-04-10 | Stop reason: ALTCHOICE

## 2022-03-24 RX ORDER — FUROSEMIDE 40 MG/1
40 TABLET ORAL DAILY
Qty: 30 TABLET | Refills: 2 | Status: SHIPPED | OUTPATIENT
Start: 2022-03-24 | End: 2022-07-01

## 2022-03-24 RX ORDER — CIPROFLOXACIN 250 MG/1
250 TABLET, FILM COATED ORAL DAILY
Qty: 3 TABLET | Refills: 0 | Status: SHIPPED | OUTPATIENT
Start: 2022-03-24 | End: 2022-04-10 | Stop reason: ALTCHOICE

## 2022-03-24 RX ORDER — ATENOLOL 50 MG/1
TABLET ORAL
Qty: 180 TABLET | Refills: 3 | Status: SHIPPED | OUTPATIENT
Start: 2022-03-24 | End: 2022-05-12 | Stop reason: SDUPTHER

## 2022-03-24 NOTE — PROGRESS NOTES
Renal Consultation Note    NAME:  Kate Forrester   :   1945   MRN:   [id number]     ATTENDING: No admitting provider for patient encounter. PCP:  None    Date/Time:  3/24/2022 12:58 PM      Subjective:    CC : Patient is here for follow-up of her CKD 4    HPI   patient is here for follow-up today. She denies any complaints today. Blood pressure is 141/64 mmHg. She claims compliance with her medications and low-salt diet. Review of recent records shows creatinine was 2.6. She has discontinued taking diclofenac and Aleve at home. Taking Tylenol for pain whicheems to be working okay    Past Medical History:   Diagnosis Date    Edema of foot 2016    Hyperlipidemia 2015    Hypertension, essential 2016    Menopause     Osteoarthritis of right knee 2019    Pelvic mass 2016    Urinary incontinence 2016      Past Surgical History:   Procedure Laterality Date    HX COLONOSCOPY  2011    DIVERTICULA IN THE LEFT COLON- RECOMMENDED 5 YEAR F/U    HX HYSTERECTOMY  UNKNOWN    PARTIAL- STILL HAS OVARIES     Social History     Tobacco Use    Smoking status: Never Smoker    Smokeless tobacco: Never Used   Substance Use Topics    Alcohol use: Not on file      Family History   Problem Relation Age of Onset    Breast Cancer Sister     Kidney Disease Brother         KIDNEY TRANSPLANT    OSTEOARTHRITIS Son         MENTALLY DELAYED       No Known Allergies   Prior to Admission medications    Medication Sig Start Date End Date Taking? Authorizing Provider   atenoloL (TENORMIN) 50 mg tablet TAKE 1 TABLET TWICE A DAY  (NEW DIRECTIONS) 3/24/22  Yes Terrell Sheriff MD   furosemide (LASIX) 40 mg tablet Take 1 Tablet by mouth daily. 3/24/22  Yes Terrell Sheriff MD   furosemide (LASIX) 40 mg tablet Take 1 Tablet by mouth daily as needed (swelling). 3/24/22  Yes Terrell Sheriff MD   ciprofloxacin HCl (CIPRO) 250 mg tablet Take 1 Tablet by mouth daily.  3/24/22  Yes Terrell Sheriff MD amLODIPine (NORVASC) 10 mg tablet Take 1 Tablet by mouth daily. 10/7/21  Yes Anita Guerrero MD   atorvastatin (LIPITOR) 40 mg tablet TAKE 1 TABLET DAILY 8/29/21  Yes Betsy Street MD   cholecalciferol (Vitamin D3) (5000 Units/125 mcg) tab tablet Take 5,000 Units by mouth daily. Yes Art, MD Janene   aspirin delayed-release 81 mg tablet Take 81 mg by mouth daily. Yes Art, MD Janene       REVIEW OF SYSTEMS:       Constitutional: Negative for chills and fever. HENT: Negative. Eyes: Negative. Respiratory: Negative. Cardiovascular: Negative. Gastrointestinal: Negative for abdominal pain and nausea. Skin: Negative. Neurological: Negative. Objective:   VITALS:    Visit Vitals  BP (!) 141/64 (BP 1 Location: Left upper arm, BP Patient Position: Sitting)   Pulse (!) 55   Wt 82.6 kg (182 lb 3.2 oz)   BMI 31.77 kg/m²     @TMAX(24)@    PHYSICAL EXAM:     General: NAD, alert, cooperative  Eyes: sclera anicteric  Oral Cavity: No thrush or ulcers  Neck: no JVD  Chest: Fair bilateral air entry. No Wheezing or Rhonchi. No rales. Heart: normal sounds  Abdomen: soft and non tender   : no anne  Lower Extremities: No edema in both legs . Rajat Gutter   Good capillary refill   skin: no rash  Neuro: intact, no focals  Psychiatric: non-depressed          LAB DATA REVIEWED:     PTH INTACT   Collected: 03/21/22 0970  Final result  Specimen: Serum     Calcium 9.7 mg/dL PTH, Intact 89.2 High  pg/mL          03/21/22 1220  RENAL FUNCTION PANEL   Collected: 03/21/22 0954  Final result  Specimen: Serum or Plasma    Sodium 138 mmol/L Creatinine 2.60 High  mg/dL   Potassium 4.4 mmol/L BUN/Creatinine ratio 16     Chloride 105 mmol/L GFR est AA 22 ml/min/1.73m2   CO2 19 Low  mmol/L GFR est non-AA 18 ml/min/1.73m2    Anion gap 14 mmol/L Calcium 9.6 mg/dL   Glucose 101 mg/dL Phosphorus 4.5 mg/dL   BUN 42 High  mg/dL Albumin 3.7 g/dL          03/21/22 1215  PROTEIN/CREATININE RATIO, URINE   Collected: 03/21/22 0998 Final result  Specimen: Urine, random    Protein, urine random 30 High  mg/dL Protein/Creat. urine Ratio 0.2     Creatinine, urine 129.30 mg/dL             03/21/22 1200  URINALYSIS W/MICROSCOPIC   Collected: 03/21/22 0954  Final result  Specimen: Urine    Color Yellow    Blood Moderate Abnormal      Appearance Cloudy   Urobilinogen 0.2 EU/dL   Specific gravity 1.014   Nitrites Negative     pH (UA) 5.0   Leukocyte Esterase Large Abnormal      Protein 30 Abnormal  mg/dL WBC  /hpf   Glucose Negative mg/dL RBC 5-10 /hpf   Ketone Negative mg/dL Epithelial cells Many /lpf    Bilirubin Negative   Bacteria 2+ Abnormal  /hpf               PTH INTACT   Collected: 09/30/21 0945  Final result  Specimen: Serum     Calcium 9.8 mg/dL PTH, Intact 64.7 pg/mL          09/30/21 1206  URINALYSIS W/MICROSCOPIC   Collected: 09/30/21 0945  Final result  Specimen: Urine    Color Yellow    Blood Negative     Appearance Cloudy   Urobilinogen 0.2 EU/dL   Specific gravity 1.014   Nitrites Negative     pH (UA) 5.0   Leukocyte Esterase LargeAbnormal      Protein 30Abnormal  mg/dL WBC  /hpf   Glucose Negative mg/dL RBC 0-5 /hpf   Ketone Negative mg/dL Epithelial cells Moderate /lpf    Bilirubin Negative   Bacteria 3+Abnormal  /hpf          09/30/21 1126  PROTEIN/CREATININE RATIO, URINE   Collected: 09/30/21 1411  Final result  Specimen: Urine, random    Protein, urine random 34High  mg/dL Protein/Creat. urine Ratio 0.2     Creatinine, urine 140.70 mg/dL             09/30/21 1126  RENAL FUNCTION PANEL   Collected: 09/30/21 0945  Final result  Specimen: Serum or Plasma    Sodium 136 mmol/L Creatinine 2. 40High  mg/dL   Potassium 3.9 mmol/L BUN/Creatinine ratio 17     Chloride 102 mmol/L GFR est AA 24 ml/min/1.73m2   CO2 22 mmol/L GFR est non-AA 20 ml/min/1.73m2    Anion gap 12 mmol/L Calcium 9.5 mg/dL   Glucose 117High  mg/dL Phosphorus 3.4 mg/dL   BUN 40High  mg/dL Albumin 3.8 g/dL              06/22/21 0336  VITAMIN D, 25 HYDROXY   Collected: 06/21/21 1005  Final result  Specimen: Serum    Vitamin D 25-Hydroxy 85.1 ng/mL             06/21/21 1400  URINALYSIS W/MICROSCOPIC   Collected: 06/21/21 1005  Final result  Specimen: Urine    Color Yellow/Straw    Blood SmallAbnormal      Appearance HazyAbnormal    Urobilinogen 0.2 EU/dL   Specific gravity 1.015   Nitrites Negative     pH (UA) 5.0   Leukocyte Esterase ModerateAbnormal      Protein 30Abnormal  mg/dL WBC  /hpf   Glucose Negative mg/dL RBC 0-5 /hpf   Ketone Negative mg/dL Epithelial cells Moderate /lpf    Bilirubin Negative   Bacteria 2+Abnormal  /hpf          06/21/21 1234  RENAL FUNCTION PANEL   Collected: 06/21/21 1005  Final result  Specimen: Serum or Plasma    Sodium 138 mmol/L Creatinine 2. 40High  mg/dL   Potassium 3.7 mmol/L BUN/Creatinine ratio 19     Chloride 103 mmol/L GFR est AA 24 ml/min/1.73m2   CO2 23 mmol/L GFR est non-AA 20 ml/min/1.73m2    Anion gap 12 mmol/L Calcium 9.4 mg/dL   Glucose 123High  mg/dL Phosphorus 4.3 mg/dL   BUN 46High  mg/dL Albumin 3.6 g/dL          06/21/21 1130  PROTEIN URINE, RANDOM   Collected: 06/21/21 1021  Final result  Specimen: Urine, random    Protein, urine random 39High  mg/dL            06/21/21 1130  PROTEIN/CREATININE RATIO, URINE   Collected: 06/21/21 1003  Final result  Specimen: Urine, random    Protein, urine random 40High  mg/dL Protein/Creat. urine Ratio 0.3     Creatinine, urine 130.30 mg/dL                 Labs from 2/23/2021 show sodium 138 potassium 3.6 chloride 104 CO2 24 glucose 143 BUN 30 creatinine 2.2 GFR 26 calcium 8.9 hemoglobin 10.9  Labs from 4/16/2021 show sodium 137 potassium 3.5 chloride 101 CO2 27 glucose 140 BUN 40 creatinine 2.4 GFR 24 calcium 8.9    Recommendations/Plan:     #1 acute kidney injury on chronic kidney disease stage IV  -Creatinine has slightly increased from 2.3--> 2.6 on recent labs.   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 Advised her to discontinue all NSAIDs from here on  -Renal ultrasound shows bilateral cysts which are simple. No need for further work-up. Increased echogenicity seen consistent with medical renal disease  -Her losartan was discontinued 2/2 erik. She has minimal proteinuria. I will continue to hold  -Urine is suggestive of UTI. Only 0.2 g proteinuria per day  -Check urine and serum electrophoresis  -Follow-up on renal function in 6 weeks    #2 hypertension  -Blood pressure is well controlled in the office and at home now  -I will continue Norvasc 10 mg, atenolol 50 mg daily which I will continue.   -continue Lasix 40 mg as needed only  -Advised her to be on a low-salt diet which she is not compliant with yet  -I have asked her to call me if the blood pressure is beyond parameters at home    #3 renal osteodystrophy  -Within target intact parathyroid hormone . normal 25-hydroxy vitamin D level  -Calcium and phosphorus are okay    #4 anemia  -hemoglobin is 10.9. Likely anemia of chronic kidney disease.    -No need for MAJO agents yet. I will check iron studies with next labs    #5 right hand swelling  -Significant swelling present in the right hand for the past month  -Does not seem to be cellulitis. Patient thinks it is gout though it seems unlikely. We will check a uric acid level  -We will order a venous Doppler stat to rule out DVT  -I am also calling in Keflex 500 twice daily for 7 days in case it is cellulitis  -I have advised her to go to the ER if swelling does not improve over the next few days with above treatment    #6 UTI  -Urine suggestive of UTI.   I will call in ciprofloxacin 250 mg daily for 3 days    Thank you for the referral.  I will see her back in the office in 2 months with preclinic labs    ________________________________________________________________________  Signed: Nicole Sousa MD

## 2022-03-24 NOTE — PROGRESS NOTES
João Carbajal presents today for   Chief Complaint   Patient presents with    Follow-up     ERI       Is someone accompanying this pt? no    Is the patient using any DME equipment during OV? no    Depression Screening:  3 most recent PHQ Screens 3/24/2022   Little interest or pleasure in doing things Not at all   Feeling down, depressed, irritable, or hopeless Not at all   Total Score PHQ 2 0       Learning Assessment:  Learning Assessment 9/21/2020   PRIMARY LEARNER Patient   HIGHEST LEVEL OF EDUCATION - PRIMARY LEARNER  GRADUATED HIGH SCHOOL OR GED   BARRIERS PRIMARY LEARNER NONE   CO-LEARNER CAREGIVER No   PRIMARY LANGUAGE ENGLISH   LEARNER PREFERENCE PRIMARY DEMONSTRATION   ANSWERED BY patient   RELATIONSHIP SELF       Fall Risk  Fall Risk Assessment, last 12 mths 3/24/2022   Able to walk? Yes   Fall in past 12 months? 0   Do you feel unsteady? 0   Are you worried about falling 0       Coordination of Care:  1. Have you been to the ER, urgent care clinic since your last visit? Hospitalized since your last visit? no    2. Have you seen or consulted any other health care providers outside of the [address] [place] [address] since your last visit? Include any pap smears or colon screening.  Aly found a new PCP

## 2022-04-08 ENCOUNTER — OFFICE VISIT (OUTPATIENT)
Dept: FAMILY MEDICINE CLINIC | Age: 77
End: 2022-04-08
Payer: MEDICARE

## 2022-04-08 VITALS
TEMPERATURE: 97.5 F | OXYGEN SATURATION: 98 % | DIASTOLIC BLOOD PRESSURE: 66 MMHG | HEART RATE: 55 BPM | BODY MASS INDEX: 32.1 KG/M2 | SYSTOLIC BLOOD PRESSURE: 144 MMHG | HEIGHT: 64 IN | RESPIRATION RATE: 16 BRPM | WEIGHT: 188 LBS

## 2022-04-08 DIAGNOSIS — E55.9 VITAMIN D DEFICIENCY: ICD-10-CM

## 2022-04-08 DIAGNOSIS — R79.89 OTHER SPECIFIED ABNORMAL FINDINGS OF BLOOD CHEMISTRY: ICD-10-CM

## 2022-04-08 DIAGNOSIS — Z11.59 NEED FOR HEPATITIS C SCREENING TEST: ICD-10-CM

## 2022-04-08 DIAGNOSIS — E78.2 MIXED HYPERLIPIDEMIA: ICD-10-CM

## 2022-04-08 DIAGNOSIS — N18.9 CHRONIC KIDNEY DISEASE, UNSPECIFIED CKD STAGE: Primary | ICD-10-CM

## 2022-04-08 DIAGNOSIS — I10 HYPERTENSION, ESSENTIAL: ICD-10-CM

## 2022-04-08 DIAGNOSIS — R68.89 OTHER GENERAL SYMPTOMS AND SIGNS: ICD-10-CM

## 2022-04-08 PROCEDURE — 99214 OFFICE O/P EST MOD 30 MIN: CPT | Performed by: NURSE PRACTITIONER

## 2022-04-08 PROCEDURE — G8427 DOCREV CUR MEDS BY ELIG CLIN: HCPCS | Performed by: NURSE PRACTITIONER

## 2022-04-08 PROCEDURE — G8753 SYS BP > OR = 140: HCPCS | Performed by: NURSE PRACTITIONER

## 2022-04-08 PROCEDURE — G8536 NO DOC ELDER MAL SCRN: HCPCS | Performed by: NURSE PRACTITIONER

## 2022-04-08 PROCEDURE — 1090F PRES/ABSN URINE INCON ASSESS: CPT | Performed by: NURSE PRACTITIONER

## 2022-04-08 PROCEDURE — 1101F PT FALLS ASSESS-DOCD LE1/YR: CPT | Performed by: NURSE PRACTITIONER

## 2022-04-08 PROCEDURE — G8754 DIAS BP LESS 90: HCPCS | Performed by: NURSE PRACTITIONER

## 2022-04-08 PROCEDURE — G8417 CALC BMI ABV UP PARAM F/U: HCPCS | Performed by: NURSE PRACTITIONER

## 2022-04-08 PROCEDURE — G8432 DEP SCR NOT DOC, RNG: HCPCS | Performed by: NURSE PRACTITIONER

## 2022-04-08 PROCEDURE — G8399 PT W/DXA RESULTS DOCUMENT: HCPCS | Performed by: NURSE PRACTITIONER

## 2022-04-08 PROCEDURE — 3017F COLORECTAL CA SCREEN DOC REV: CPT | Performed by: NURSE PRACTITIONER

## 2022-04-08 NOTE — PROGRESS NOTES
Lindsey Vaughan presents today for   Chief Complaint   Patient presents with   [address]     establish care with provider was a [facility] patient        Is someone accompanying this pt? No     Is the patient using any DME equipment during OV? No     Depression Screening:  3 most recent PHQ Screens 4/8/2022   Little interest or pleasure in doing things Not at all   Feeling down, depressed, irritable, or hopeless Not at all   Total Score PHQ 2 0       Learning Assessment:  Learning Assessment 9/21/2020   PRIMARY LEARNER Patient   HIGHEST LEVEL OF EDUCATION - PRIMARY LEARNER  GRADUATED HIGH SCHOOL OR GED   BARRIERS PRIMARY LEARNER NONE   CO-LEARNER CAREGIVER No   PRIMARY LANGUAGE ENGLISH   LEARNER PREFERENCE PRIMARY DEMONSTRATION   ANSWERED BY patient   RELATIONSHIP SELF       Fall Risk  Fall Risk Assessment, last 12 mths 4/8/2022   Able to walk? Yes   Fall in past 12 months? 0   Do you feel unsteady? 0   Are you worried about falling 0       ADL  ADL Assessment 4/22/2021   Feeding yourself No Help Needed   Getting from bed to chair No Help Needed   Getting dressed No Help Needed   Bathing or showering No Help Needed   Walk across the room (includes cane/walker) No Help Needed   Using the telphone No Help Needed   Taking your medications No Help Needed   Preparing meals No Help Needed   Managing money (expenses/bills) No Help Needed   Moderately strenuous housework (laundry) No Help Needed   Shopping for personal items (toiletries/medicines) No Help Needed   Shopping for groceries No Help Needed   Driving No Help Needed   Climbing a flight of stairs No Help Needed   Getting to places beyond walking distances No Help Needed       Travel Screening:    Travel Screening     Question   Response    In the last 10 days, have you been in contact with someone who was confirmed or suspected to have Coronavirus/COVID-19? No / Unsure    Have you had a COVID-19 viral test in the last 10 days?   No    Do you have any of the following new or worsening symptoms? None of these    Have you traveled internationally or domestically in the last month? No      Travel History   Travel since 03/08/22    No documented travel since 03/08/22         Health Maintenance reviewed and discussed and ordered per Provider. Health Maintenance Due   Topic Date Due    Hepatitis C Screening  Never done    Shingrix Vaccine Age 49> (1 of 2) Never done    Pneumococcal 65+ yrs at Risk Vaccine (2 of 2 - PCV13) 03/17/2015    Lipid Screen  04/05/2020    Medicare Yearly Exam  Never done    Colorectal Cancer Screening Combo  07/01/2021   . Coordination of Care:  1. \"Have you been to the ER, urgent care clinic since your last visit? Hospitalized since your last visit? \" No    2. \"Have you seen or consulted any other health care providers outside of the [address] [place] [address] since your last visit? \" No     3. For patients aged 39-70: Has the patient had a colonoscopy? Yes - Care Gap present. Most recent result on file     If the patient is female:    4. For patients aged 41-77: Has the patient had a mammogram within the past 2 years? Yes - no Care Gap present    5. For patients aged 21-65: Has the patient had a pap smear?  NA - based on age

## 2022-04-08 NOTE — PROGRESS NOTES
History of Present Illness  Marysol Gardner is a 77 y.o. female who presents today for:    Chief Complaint   Patient presents with   [facility]     establish care with provider was a [facility]      Past Medical History  Past Medical History:   Diagnosis Date    Edema of foot 1/11/2016    Hyperlipidemia 6/24/2015    Hypertension, essential 9/20/2016    Menopause     Osteoarthritis of right knee 8/8/2019    Pelvic mass 7/21/2016    Urinary incontinence 6/27/2016        Surgical History  Past Surgical History:   Procedure Laterality Date    HX COLONOSCOPY  07/01/2011    DIVERTICULA IN THE LEFT COLON- RECOMMENDED 5 YEAR F/U    HX HYSTERECTOMY  UNKNOWN    PARTIAL- STILL HAS OVARIES        Current Medications  Current Outpatient Medications   Medication Sig    atenoloL (TENORMIN) 50 mg tablet TAKE 1 TABLET TWICE A DAY  (NEW DIRECTIONS)    furosemide (LASIX) 40 mg tablet Take 1 Tablet by mouth daily.  amLODIPine (NORVASC) 10 mg tablet Take 1 Tablet by mouth daily.  atorvastatin (LIPITOR) 40 mg tablet TAKE 1 TABLET DAILY    cholecalciferol (Vitamin D3) (5000 Units/125 mcg) tab tablet Take 5,000 Units by mouth daily.  aspirin delayed-release 81 mg tablet Take 81 mg by mouth daily.  furosemide (LASIX) 40 mg tablet Take 1 Tablet by mouth daily as needed (swelling). (Patient not taking: Reported on 4/8/2022)    ciprofloxacin HCl (CIPRO) 250 mg tablet Take 1 Tablet by mouth daily. (Patient not taking: Reported on 4/8/2022)     No current facility-administered medications for this visit.        Allergies/Drug Reactions  No Known Allergies     Family History  Family History   Problem Relation Age of Onset    Breast Cancer Sister     Kidney Disease Brother         KIDNEY TRANSPLANT    OSTEOARTHRITIS Son         MENTALLY DELAYED        Social History  Social History     Tobacco Use    Smoking status: Never Smoker    Smokeless tobacco: Never Used   Vaping Use    Vaping Use: Never used Substance Use Topics    Alcohol use: Not on file    Drug use: Never        Health Maintenance   Topic Date Due    Hepatitis C Screening  Never done    Shingrix Vaccine Age 49> (1 of 2) Never done    Pneumococcal 65+ yrs at Risk Vaccine (2 of 2 - PCV13) 03/17/2015    Lipid Screen  04/05/2020    Medicare Yearly Exam  Never done    Colorectal Cancer Screening Combo  07/01/2021    Depression Screen  04/08/2023    DTaP/Tdap/Td series (2 - Td or Tdap) 03/17/2024    Bone Densitometry (Dexa) Screening  Completed    Flu Vaccine  Completed    COVID-19 Vaccine  Completed     Immunization History   Administered Date(s) Administered    COVID-19, Moderna Booster, PF, 0.25mL Dose 11/01/2021    COVID-19, Moderna, Primary or Immunocompromised Series, MRNA, PF, 100mcg/0.5mL 01/21/2021, 02/15/2021    Influenza Vaccine 11/01/2016, 11/14/2017, 10/30/2018, 11/04/2019, 10/12/2020    Influenza Vaccine (Quad) PF (>6 Mo Flulaval, Fluarix, and >3 Yrs Afluria, Fluzone 26726) 11/16/2020, 11/16/2020    Influenza, Quadrivalent, Adjuvanted (>65 Yrs FLUAD QUAD [id number]) 10/12/2020, 10/12/2021    Pneumococcal Polysaccharide (PPSV-23) 03/17/2014    Tdap 03/17/2014       Review of Systems  Review of Systems   Constitutional: Negative. HENT: Negative. Eyes: Negative. Patient reports she is followed by ophthalmology annually and will have follow in Fall 2022. Respiratory: Negative. Cardiovascular: Positive for leg swelling. Negative for chest pain, palpitations, orthopnea, claudication and PND. Patient reports bilateral lower extremity edema occasionally and is prescribed Furosemide as needed. Gastrointestinal: Negative. Genitourinary: Negative. Musculoskeletal: Positive for joint pain. Negative for back pain, falls, myalgias and neck pain. Skin: Negative. Neurological: Negative. Endo/Heme/Allergies: Negative. Psychiatric/Behavioral: Negative.          Physical Exam  Vital signs:   Vitals: 04/08/22 0951   BP: (!) 144/66   Pulse: (!) 55   Resp: 16   Temp: 97.5 °F (36.4 °C)   SpO2: 98%   Weight: 188 lb (85.3 kg)   Height: 5' 4\" (1.626 m)     General: alert, oriented, not in distress  Head: scalp normal, atraumatic  Eyes: pupils are equal and reactive, full and intact EOM's  Ears: patent ear canal, intact tympanic membrane  Nose: normal turbinates, no congestion or discharge  Lips/Mouth: moist lips and buccal mucosa, non-enlarged tonsils, pink throat  Neck: supple, no JVD, no lymphadenopathy, non-palpable thyroid  Chest/Lungs: clear breath sounds, no wheezing or crackles  Heart: normal rate, regular rhythm, no murmur  Abdomen: soft, non-distended, non-tender, normal bowel sounds, no organomegaly, no masses  Extremities: no focal deformities, no edema  Skin: no active skin lesions    Laboratory/Tests:  Hospital Outpatient Visit on 03/21/2022   Component Date Value Ref Range Status    Protein, urine random 03/21/2022 30* 6 - 10 mg/dL Final    Creatinine, urine 03/21/2022 129.30  22 - 392 mg/dL Final    No reference range has been established.  Protein/Creat. urine Ratio 03/21/2022 0.2    Final    Sodium 03/21/2022 138  135 - 145 mmol/L Final    Potassium 03/21/2022 4.4  3.2 - 5.1 mmol/L Final    Chloride 03/21/2022 105  94 - 111 mmol/L Final    CO2 03/21/2022 19* 21 - 33 mmol/L Final    Anion gap 03/21/2022 14  mmol/L Final    Glucose 03/21/2022 101  70 - 110 mg/dL Final    BUN 03/21/2022 42* 9 - 21 mg/dL Final    Creatinine 03/21/2022 2.60* 0.70 - 1.20 mg/dL Final    BUN/Creatinine ratio 03/21/2022 16    Final    GFR est AA 03/21/2022 22  ml/min/1.73m2 Final    GFR est non-AA 03/21/2022 18  ml/min/1.73m2 Final    Comment: Estimated GFR is calculated using the IDMS-traceable Modification of Diet in Renal Disease (MDRD) Study equation, reported for both  Americans (GFRAA) and non- Americans (GFRNA), and normalized to 1.73m2 body surface area.  The physician must decide which value applies to the patient. The MDRD study equation should only be used in individuals age 25 or older. It has not been validated for the following: pregnant women, patients with serious comorbid conditions, or on certain medications, or persons with extremes of body size, muscle mass, or nutritional status.  Calcium 03/21/2022 9.6  8.5 - 10.5 mg/dL Final    Phosphorus 03/21/2022 4.5  2.5 - 4.5 mg/dL Final    Albumin 03/21/2022 3.7  3.5 - 4.7 g/dL Final    Color 03/21/2022 Yellow    Final    Color Reference Range: Straw, Yellow or Dark Yellow    Appearance 03/21/2022 Cloudy    Final    Specific gravity 03/21/2022 1.014  1.005 - 1.030   Final    pH (UA) 03/21/2022 5.0  5.0 - 8.0   Final    Protein 03/21/2022 30* Negative mg/dL Final    Glucose 03/21/2022 Negative  Negative mg/dL Final    Ketone 03/21/2022 Negative  Negative mg/dL Final    Bilirubin 03/21/2022 Negative  Negative   Final    Blood 03/21/2022 Moderate* Negative   Final    Urobilinogen 03/21/2022 0.2  0.2 - 1.0 EU/dL Final    Nitrites 03/21/2022 Negative  Negative   Final    Leukocyte Esterase 03/21/2022 Large* Negative   Final    WBC 03/21/2022   0 - 4 /hpf Final    RBC 03/21/2022 5-10  0 - 2 /hpf Final    Epithelial cells 03/21/2022 Many  0 - 20 /lpf Final    Epithelial cell category consists of squamous cells and/or transitional urothelial cells. Renal tubular cells, if present, are separately identified as such.  Bacteria 03/21/2022 2+* None /hpf Final    Calcium 03/21/2022 9.7  8.5 - 10.1 mg/dL Final    PTH, Intact 03/21/2022 89.2* 18.4 - 88.0 pg/mL Final   Hospital Outpatient Visit on 01/14/2022   Component Date Value Ref Range Status    Protein, urine random 01/14/2022 39* 6 - 10 mg/dL Final    Creatinine, urine 01/14/2022 129.70  22 - 392 mg/dL Final    No reference range has been established.  Protein/Creat.  urine Ratio 01/14/2022 0.3    Final    Sodium 01/14/2022 139  135 - 145 mmol/L Final    Potassium 01/14/2022 4.1  3.2 - 5.1 mmol/L Final    Chloride 01/14/2022 104  94 - 111 mmol/L Final    CO2 01/14/2022 23  21 - 33 mmol/L Final    Anion gap 01/14/2022 12  mmol/L Final    Glucose 01/14/2022 110  70 - 110 mg/dL Final    BUN 01/14/2022 42* 9 - 21 mg/dL Final    Creatinine 01/14/2022 2.30* 0.70 - 1.20 mg/dL Final    BUN/Creatinine ratio 01/14/2022 18    Final    GFR est AA 01/14/2022 25  ml/min/1.73m2 Final    GFR est non-AA 01/14/2022 21  ml/min/1.73m2 Final    Comment: Estimated GFR is calculated using the IDMS-traceable Modification of Diet in Renal Disease (MDRD) Study equation, reported for both  Americans (GFRAA) and non- Americans (GFRNA), and normalized to 1.73m2 body surface area. The physician must decide which value applies to the patient. The MDRD study equation should only be used in individuals age 25 or older. It has not been validated for the following: pregnant women, patients with serious comorbid conditions, or on certain medications, or persons with extremes of body size, muscle mass, or nutritional status.       Calcium 01/14/2022 9.3  8.5 - 10.5 mg/dL Final    Phosphorus 01/14/2022 3.9  2.5 - 4.5 mg/dL Final    Albumin 01/14/2022 3.2* 3.5 - 4.7 g/dL Final    Color 01/14/2022 Yellow    Final    Color Reference Range: Straw, Yellow or Dark Yellow    Appearance 01/14/2022 Cloudy    Final    Specific gravity 01/14/2022 1.015  1.005 - 1.030   Final    pH (UA) 01/14/2022 5.0  5.0 - 8.0   Final    Protein 01/14/2022 30* Negative mg/dL Final    Glucose 01/14/2022 Negative  Negative mg/dL Final    Ketone 01/14/2022 Negative  Negative mg/dL Final    Bilirubin 01/14/2022 Negative  Negative   Final    Blood 01/14/2022 Small* Negative   Final    Urobilinogen 01/14/2022 0.2  0.2 - 1.0 EU/dL Final    Nitrites 01/14/2022 Negative  Negative   Final    Leukocyte Esterase 01/14/2022 Large* Negative   Final    WBC 01/14/2022 20-50  0 - 4 /hpf Final    RBC 01/14/2022 0-5  0 - 2 /hpf Final    Epithelial cells 01/14/2022 Moderate  0 - 20 /lpf Final    Epithelial cell category consists of squamous cells and/or transitional urothelial cells. Renal tubular cells, if present, are separately identified as such.  Bacteria 01/14/2022 3+* None /hpf Final    Calcium 01/14/2022 9.5  8.5 - 10.1 mg/dL Final    PTH, Intact 01/14/2022 73.0  18.4 - 88.0 pg/mL Final     Patient she is followed nephrologist, Dr. Bjorn Rutledge, Stage IV CKD. She will have follow up appointment on 7/7/2022. Patient reports history of left knee Baker's cyst.  Patient reports Baker's cyst is not cause pain and does not wish to have it surgically removed at this time. Physical examination performed:  Bilateral lung sounds clear to auscultation in all fields. Bilateral ear tympanic membrane visualized on otoscopic examination revealed no abnormalities. Cardiac auscultation revealed no arrhythmias or murmurs. Abdomen soft and nontender, bowel sounds normoactive in all 4 quadrants. There is +1 bilateral lower extremity edema observed. Assessment/Plan:    1. Bilateral lower extremity edema. Continue Furosemide 20 mg tablet daily for management of bilateral lower extremity edema. 2.  Chronic kidney disease. Continue follow-up as directed with nephrology for management of CKD. 3.  Essential hypertension. Continue Amlodipine 10 mg tablet daily and Atenolol 50 mg tablet twice daily for management of essential hypertension. 4.  Mixed hyperlipidemia. Continue Atorvastatin 40 mg tablet daily for management of mixed hyperlipidemia. Follow-up and Dispositions    · Return in about 1 month (around 5/8/2022). I have discussed the diagnosis with the patient and the intended plan as seen in the above orders. The patient has received an after-visit summary and questions were answered concerning future plans. I have discussed medication side effects and warnings with the patient as well.  I have reviewed the plan of care with the patient, accepted their input and they are in agreement with the treatment goals.        Jina Rojas NP  April 10, 2022

## 2022-04-10 DIAGNOSIS — R79.89 OTHER SPECIFIED ABNORMAL FINDINGS OF BLOOD CHEMISTRY: ICD-10-CM

## 2022-04-10 DIAGNOSIS — Z11.59 NEED FOR HEPATITIS C SCREENING TEST: ICD-10-CM

## 2022-04-10 DIAGNOSIS — I10 HYPERTENSION, ESSENTIAL: ICD-10-CM

## 2022-04-10 DIAGNOSIS — R68.89 OTHER GENERAL SYMPTOMS AND SIGNS: ICD-10-CM

## 2022-04-10 DIAGNOSIS — N18.9 CHRONIC KIDNEY DISEASE, UNSPECIFIED CKD STAGE: ICD-10-CM

## 2022-04-10 DIAGNOSIS — E55.9 VITAMIN D DEFICIENCY: ICD-10-CM

## 2022-04-11 ENCOUNTER — HOSPITAL ENCOUNTER (OUTPATIENT)
Dept: LAB | Age: 77
Discharge: HOME | End: 2022-04-11
Payer: MEDICARE

## 2022-04-11 DIAGNOSIS — E78.2 MIXED HYPERLIPIDEMIA: ICD-10-CM

## 2022-04-11 PROBLEM — E55.9 VITAMIN D DEFICIENCY: Status: ACTIVE | Noted: 2022-04-10

## 2022-04-11 LAB
25(OH)D3 SERPL-MCNC: 82.6 NG/ML (ref 30–100)
BASOPHILS # BLD: 0.1 K/UL (ref 0–0.1)
BASOPHILS NFR BLD: 1 % (ref 0–2)
CHOLEST SERPL-MCNC: 162 MG/DL
DIFFERENTIAL METHOD BLD: ABNORMAL
EOSINOPHIL # BLD: 0.1 K/UL (ref 0–0.4)
EOSINOPHIL NFR BLD: 2 % (ref 0–5)
ERYTHROCYTE [DISTWIDTH] IN BLOOD BY AUTOMATED COUNT: 16.2 % (ref 11.6–14.5)
EST. AVERAGE GLUCOSE BLD GHB EST-MCNC: 137 MG/DL
FOLATE SERPL-MCNC: 6 NG/ML (ref 3.1–17.5)
HBA1C MFR BLD: 6.4 % (ref 4.2–5.6)
HCT VFR BLD AUTO: 40.1 % (ref 35–45)
HDLC SERPL-MCNC: 49 MG/DL (ref 40–60)
HDLC SERPL: 3.3 {RATIO} (ref 0–5)
HGB BLD-MCNC: 12 G/DL (ref 12–16)
IMM GRANULOCYTES # BLD AUTO: 0 K/UL (ref 0–0.04)
IMM GRANULOCYTES NFR BLD AUTO: 0 % (ref 0–0.5)
LDLC SERPL CALC-MCNC: 92.8 MG/DL (ref 0–100)
LIPID PROFILE,FLP: NORMAL
LYMPHOCYTES # BLD: 1.6 K/UL (ref 0.9–3.6)
LYMPHOCYTES NFR BLD: 23 % (ref 21–52)
MCH RBC QN AUTO: 25.4 PG (ref 24–34)
MCHC RBC AUTO-ENTMCNC: 29.9 G/DL (ref 31–37)
MCV RBC AUTO: 85 FL (ref 78–100)
MONOCYTES # BLD: 0.6 K/UL (ref 0.05–1.2)
MONOCYTES NFR BLD: 8 % (ref 3–10)
NEUTS SEG # BLD: 4.8 K/UL (ref 1.8–8)
NEUTS SEG NFR BLD: 66 % (ref 40–73)
NRBC # BLD: 0 K/UL (ref 0–0.01)
NRBC BLD-RTO: 0 PER 100 WBC
PLATELET # BLD AUTO: 244 K/UL (ref 135–420)
PMV BLD AUTO: 11.6 FL (ref 9.2–11.8)
RBC # BLD AUTO: 4.72 M/UL (ref 4.2–5.3)
TRIGL SERPL-MCNC: 101 MG/DL (ref ?–150)
TSH SERPL DL<=0.05 MIU/L-ACNC: 2.06 UIU/ML (ref 0.35–6.2)
VIT B12 SERPL-MCNC: 413 PG/ML (ref 211–911)
VLDLC SERPL CALC-MCNC: 20.2 MG/DL
WBC # BLD AUTO: 7.2 K/UL (ref 4.6–13.2)

## 2022-04-11 PROCEDURE — 85025 COMPLETE CBC W/AUTO DIFF WBC: CPT

## 2022-04-11 PROCEDURE — 80061 LIPID PANEL: CPT

## 2022-04-11 PROCEDURE — 83036 HEMOGLOBIN GLYCOSYLATED A1C: CPT

## 2022-04-11 PROCEDURE — 86803 HEPATITIS C AB TEST: CPT

## 2022-04-11 PROCEDURE — 82607 VITAMIN B-12: CPT

## 2022-04-11 PROCEDURE — 82306 VITAMIN D 25 HYDROXY: CPT

## 2022-04-11 PROCEDURE — 84443 ASSAY THYROID STIM HORMONE: CPT

## 2022-04-15 LAB
HCV AB SER IA-ACNC: 0.07 INDEX
HCV AB SERPL QL IA: NEGATIVE
HCV COMMENT,HCGAC: NORMAL

## 2022-05-12 ENCOUNTER — OFFICE VISIT (OUTPATIENT)
Dept: FAMILY MEDICINE CLINIC | Age: 77
End: 2022-05-12
Payer: MEDICARE

## 2022-05-12 VITALS
HEART RATE: 51 BPM | WEIGHT: 188 LBS | OXYGEN SATURATION: 99 % | BODY MASS INDEX: 32.1 KG/M2 | HEIGHT: 64 IN | RESPIRATION RATE: 17 BRPM | DIASTOLIC BLOOD PRESSURE: 73 MMHG | SYSTOLIC BLOOD PRESSURE: 158 MMHG

## 2022-05-12 DIAGNOSIS — I73.9 PERIPHERAL ARTERY DISEASE (HCC): Primary | ICD-10-CM

## 2022-05-12 DIAGNOSIS — R00.1 BRADYCARDIA: ICD-10-CM

## 2022-05-12 DIAGNOSIS — I10 HYPERTENSION, ESSENTIAL: ICD-10-CM

## 2022-05-12 DIAGNOSIS — N18.4 CKD (CHRONIC KIDNEY DISEASE) STAGE 4, GFR 15-29 ML/MIN (HCC): ICD-10-CM

## 2022-05-12 PROCEDURE — 1101F PT FALLS ASSESS-DOCD LE1/YR: CPT | Performed by: NURSE PRACTITIONER

## 2022-05-12 PROCEDURE — G8536 NO DOC ELDER MAL SCRN: HCPCS | Performed by: NURSE PRACTITIONER

## 2022-05-12 PROCEDURE — G8432 DEP SCR NOT DOC, RNG: HCPCS | Performed by: NURSE PRACTITIONER

## 2022-05-12 PROCEDURE — G8399 PT W/DXA RESULTS DOCUMENT: HCPCS | Performed by: NURSE PRACTITIONER

## 2022-05-12 PROCEDURE — G8754 DIAS BP LESS 90: HCPCS | Performed by: NURSE PRACTITIONER

## 2022-05-12 PROCEDURE — 1090F PRES/ABSN URINE INCON ASSESS: CPT | Performed by: NURSE PRACTITIONER

## 2022-05-12 PROCEDURE — 99214 OFFICE O/P EST MOD 30 MIN: CPT | Performed by: NURSE PRACTITIONER

## 2022-05-12 PROCEDURE — G8427 DOCREV CUR MEDS BY ELIG CLIN: HCPCS | Performed by: NURSE PRACTITIONER

## 2022-05-12 PROCEDURE — G8417 CALC BMI ABV UP PARAM F/U: HCPCS | Performed by: NURSE PRACTITIONER

## 2022-05-12 PROCEDURE — G8753 SYS BP > OR = 140: HCPCS | Performed by: NURSE PRACTITIONER

## 2022-05-12 RX ORDER — LOSARTAN POTASSIUM 25 MG/1
25 TABLET ORAL DAILY
Qty: 90 TABLET | Refills: 1 | Status: SHIPPED | OUTPATIENT
Start: 2022-05-12

## 2022-05-12 RX ORDER — ATENOLOL 25 MG/1
TABLET ORAL
Qty: 180 TABLET | Refills: 1 | Status: SHIPPED | OUTPATIENT
Start: 2022-05-12 | End: 2022-11-03

## 2022-05-12 NOTE — PROGRESS NOTES
History of Present Illness  Jing Ag is a 69 y.o. female who presents today for:    Chief Complaint   Patient presents with    Follow-up     1 month follow up on blood work      Past Medical History  Past Medical History:   Diagnosis Date    Edema of foot 1/11/2016    Hyperlipidemia 6/24/2015    Hypertension, essential 9/20/2016    Menopause     Osteoarthritis of right knee 8/8/2019    Pelvic mass 7/21/2016    Urinary incontinence 6/27/2016        Surgical History  Past Surgical History:   Procedure Laterality Date    HX COLONOSCOPY  07/01/2011    DIVERTICULA IN THE LEFT COLON- RECOMMENDED 5 YEAR F/U    HX HYSTERECTOMY  UNKNOWN    PARTIAL- STILL HAS OVARIES        Current Medications  Current Outpatient Medications   Medication Sig    atenoloL (TENORMIN) 50 mg tablet TAKE 1 TABLET TWICE A DAY  (NEW DIRECTIONS)    furosemide (LASIX) 40 mg tablet Take 1 Tablet by mouth daily.  amLODIPine (NORVASC) 10 mg tablet Take 1 Tablet by mouth daily.  atorvastatin (LIPITOR) 40 mg tablet TAKE 1 TABLET DAILY    cholecalciferol (Vitamin D3) (5000 Units/125 mcg) tab tablet Take 5,000 Units by mouth daily.  aspirin delayed-release 81 mg tablet Take 81 mg by mouth daily. No current facility-administered medications for this visit.        Allergies/Drug Reactions  No Known Allergies     Family History  Family History   Problem Relation Age of Onset    Breast Cancer Sister     Kidney Disease Brother         KIDNEY TRANSPLANT    OSTEOARTHRITIS Son         MENTALLY DELAYED        Social History  Social History     Tobacco Use    Smoking status: Never Smoker    Smokeless tobacco: Never Used   Vaping Use    Vaping Use: Never used   Substance Use Topics    Alcohol use: Not on file    Drug use: Never        Health Maintenance   Topic Date Due    Shingrix Vaccine Age 50> (1 of 2) Never done    Pneumococcal 65+ years (2 - PCV) 03/17/2015    Medicare Yearly Exam  Never done    Depression Screen 04/08/2023    Lipid Screen  04/11/2023    DTaP/Tdap/Td series (2 - Td or Tdap) 03/17/2024    Hepatitis C Screening  Completed    Bone Densitometry (Dexa) Screening  Completed    Flu Vaccine  Completed    COVID-19 Vaccine  Completed     Immunization History   Administered Date(s) Administered    COVID-19, Moderna Booster, PF, 0.25mL Dose 11/01/2021    COVID-19, Moderna, Primary or Immunocompromised Series, MRNA, PF, 100mcg/0.5mL 01/21/2021, 02/15/2021    Influenza Vaccine 11/01/2016, 11/14/2017, 10/30/2018, 11/04/2019, 10/12/2020    Influenza Vaccine (Quad) PF (>6 Mo Flulaval, Fluarix, and >3 Yrs Afluria, Fluzone 55220) 11/16/2020, 11/16/2020    Influenza, Quadrivalent, Adjuvanted (>65 Yrs FLUAD QUAD 35218) 10/12/2020, 10/12/2021    Pneumococcal Polysaccharide (PPSV-23) 03/17/2014    Tdap 03/17/2014     Physical Exam  Vital signs:   Vitals:    05/12/22 1139   BP: (!) 158/74   Pulse: (!) 51   Resp: 17   SpO2: 99%   Weight: 188 lb (85.3 kg)   Height: 5' 4\" (1.626 m)     Laboratory/Tests:  Hospital Outpatient Visit on 04/11/2022   Component Date Value Ref Range Status    LIPID PROFILE 04/11/2022      Final    Cholesterol, total 04/11/2022 162  <200 mg/dL Final    Triglyceride 04/11/2022 101  <150 mg/dL Final    Comment: The drugs N-acetylcysteine (NAC) and  Metamiszole have been found to cause falsely  low results in this chemical assay. Please  be sure to submit blood samples obtained  BEFORE administration of either of these  drugs to assure correct results.       HDL Cholesterol 04/11/2022 49  40 - 60 mg/dL Final    LDL, calculated 04/11/2022 92.8  0 - 100 mg/dL Final    VLDL, calculated 04/11/2022 20.2  mg/dL Final    CHOL/HDL Ratio 04/11/2022 3.3  0 - 5.0   Final    WBC 04/11/2022 7.2  4.6 - 13.2 K/uL Final    RBC 04/11/2022 4.72  4.20 - 5.30 M/uL Final    HGB 04/11/2022 12.0  12.0 - 16.0 g/dL Final    HCT 04/11/2022 40.1  35.0 - 45.0 % Final    MCV 04/11/2022 85.0  78.0 - 100.0 FL Final    MCH 04/11/2022 25.4  24.0 - 34.0 PG Final    MCHC 04/11/2022 29.9* 31.0 - 37.0 g/dL Final    RDW 04/11/2022 16.2* 11.6 - 14.5 % Final    PLATELET 19/17/9543 409  135 - 420 K/uL Final    MPV 04/11/2022 11.6  9.2 - 11.8 FL Final    NRBC 04/11/2022 0.0  0.0  WBC Final    ABSOLUTE NRBC 04/11/2022 0.00  0.00 - 0.01 K/uL Final    NEUTROPHILS 04/11/2022 66  40 - 73 % Final    LYMPHOCYTES 04/11/2022 23  21 - 52 % Final    MONOCYTES 04/11/2022 8  3 - 10 % Final    EOSINOPHILS 04/11/2022 2  0 - 5 % Final    BASOPHILS 04/11/2022 1  0 - 2 % Final    IMMATURE GRANULOCYTES 04/11/2022 0  0 - 0.5 % Final    ABS. NEUTROPHILS 04/11/2022 4.8  1.8 - 8.0 K/UL Final    ABS. LYMPHOCYTES 04/11/2022 1.6  0.9 - 3.6 K/UL Final    ABS. MONOCYTES 04/11/2022 0.6  0.05 - 1.2 K/UL Final    ABS. EOSINOPHILS 04/11/2022 0.1  0.0 - 0.4 K/UL Final    ABS. BASOPHILS 04/11/2022 0.1  0.0 - 0.1 K/UL Final    ABS. IMM. GRANS. 04/11/2022 0.0  0.00 - 0.04 K/UL Final    DF 04/11/2022 AUTOMATED    Final    Vitamin B12 04/11/2022 413  211 - 911 pg/mL Final    Folate 04/11/2022 6.0  3.10 - 17.50 ng/mL Final    Hemoglobin A1c 04/11/2022 6.4* 4.2 - 5.6 % Final    Comment: (NOTE)  HbA1C Interpretive Ranges  <5.7              Normal  5.7 - 6.4         Consider Prediabetes  >6.5              Consider Diabetes      Est. average glucose 04/11/2022 137  mg/dL Final    Comment: (NOTE)  The eAG should be interpreted with patient characteristics in mind   since ethnicity, interindividual differences, red cell lifespan,   variation in rates of glycation, etc. may affect the validity of the   calculation.  Hepatitis C virus Ab 04/11/2022 0.07  <0.80 Index Final    Hep C virus Ab Interp. 04/11/2022 Negative  Negative Final    Hep C  virus Ab comment 04/11/2022 Index <0.80. ......................... Elroy Case Negative   Final    Comment: Index > or = to 0.80 and <1.00. .... Elroy Case Elroy Case Equivocal  Index >1.00. ......................... Elroy Case Positive        For Equivocal or Positive results, confirmation with Hepatitis C  RNA by PCR or bDNA is suggested.  Vitamin D 25-Hydroxy 04/11/2022 82.6  30 - 100 ng/mL Final    Comment: (NOTE)  Deficiency               <20 ng/mL  Insufficiency          20-30 ng/mL  Sufficient             ng/mL  Possible toxicity       >100 ng/mL    The Method used is Siemens Advia Centaur currently standardized to a   Center of Disease Control and Prevention (CDC) certified reference   [address]. Samples containing fluorescein dye can produce falsely   elevated values when tested with the ADVIA Centaur Vitamin D Assay. It is recommended that results in the toxic range, >100 ng/mL, be   retested 72 hours post fluorescein exposure.  TSH 04/11/2022 2.06  0.35 - 6.20 uIU/mL Final    Comment:    Due to TSH heterogeneity, both structurally and degree of glycosylation, monoclonal antibodies used in the TSH assay may not accurately quantitate TSH. Therefore, this result should be correlated with clinical findings as well as with other assessments of thyroid function, e.g., free T4, free T3. Hospital Outpatient Visit on 03/21/2022   Component Date Value Ref Range Status    Protein, urine random 03/21/2022 30* 6 - 10 mg/dL Final    Creatinine, urine 03/21/2022 129.30  22 - 392 mg/dL Final    No reference range has been established.  Protein/Creat.  urine Ratio 03/21/2022 0.2    Final    Sodium 03/21/2022 138  135 - 145 mmol/L Final    Potassium 03/21/2022 4.4  3.2 - 5.1 mmol/L Final    Chloride 03/21/2022 105  94 - 111 mmol/L Final    CO2 03/21/2022 19* 21 - 33 mmol/L Final    Anion gap 03/21/2022 14  mmol/L Final    Glucose 03/21/2022 101  70 - 110 mg/dL Final    BUN 03/21/2022 42* 9 - 21 mg/dL Final    Creatinine 03/21/2022 2.60* 0.70 - 1.20 mg/dL Final    BUN/Creatinine ratio 03/21/2022 16    Final    GFR est AA 03/21/2022 22  ml/min/1.73m2 Final    GFR est non-AA 03/21/2022 18  ml/min/1.73m2 Final    Comment: Estimated GFR is calculated using the IDMS-traceable Modification of Diet in Renal Disease (MDRD) Study equation, reported for both  Americans (GFRAA) and non- Americans (GFRNA), and normalized to 1.73m2 body surface area. The physician must decide which value applies to the patient. The MDRD study equation should only be used in individuals age 25 or older. It has not been validated for the following: pregnant women, patients with serious comorbid conditions, or on certain medications, or persons with extremes of body size, muscle mass, or nutritional status.  Calcium 03/21/2022 9.6  8.5 - 10.5 mg/dL Final    Phosphorus 03/21/2022 4.5  2.5 - 4.5 mg/dL Final    Albumin 03/21/2022 3.7  3.5 - 4.7 g/dL Final    Color 03/21/2022 Yellow    Final    Color Reference Range: Straw, Yellow or Dark Yellow    Appearance 03/21/2022 Cloudy    Final    Specific gravity 03/21/2022 1.014  1.005 - 1.030   Final    pH (UA) 03/21/2022 5.0  5.0 - 8.0   Final    Protein 03/21/2022 30* Negative mg/dL Final    Glucose 03/21/2022 Negative  Negative mg/dL Final    Ketone 03/21/2022 Negative  Negative mg/dL Final    Bilirubin 03/21/2022 Negative  Negative   Final    Blood 03/21/2022 Moderate* Negative   Final    Urobilinogen 03/21/2022 0.2  0.2 - 1.0 EU/dL Final    Nitrites 03/21/2022 Negative  Negative   Final    Leukocyte Esterase 03/21/2022 Large* Negative   Final    WBC 03/21/2022   0 - 4 /hpf Final    RBC 03/21/2022 5-10  0 - 2 /hpf Final    Epithelial cells 03/21/2022 Many  0 - 20 /lpf Final    Epithelial cell category consists of squamous cells and/or transitional urothelial cells. Renal tubular cells, if present, are separately identified as such.  Bacteria 03/21/2022 2+* None /hpf Final    Calcium 03/21/2022 9.7  8.5 - 10.1 mg/dL Final    PTH, Intact 03/21/2022 89.2* 18.4 - 88.0 pg/mL Final     A1c = 6.4 on 4/11/2022.  Patient reports she has been borderline, prediabetic, before and she will decrease carbohydrate intake to lower risks associated with prediabetes. Right arm B/P = 169/79  Left arm B/P = 158/73    Patient will be referred to vascular for evaluation of carotid arteries at this visit. Patient she is followed nephrologist, Dr. Lila Ibrahim, Stage IV CKD. She will have follow up appointment on 7/7/2022. Assessment/Plan:    1. Bilateral lower extremity edema. Continue Furosemide 20 mg tablet daily for management of bilateral lower extremity edema. 2.  Chronic kidney disease. Continue follow-up as directed with nephrology for management of CKD. 3.  Essential hypertension. Continue Amlodipine 10 mg tablet daily and prescribed Losartan 25 mg tablet daily for management of essential hypertension. 4.  Mixed hyperlipidemia. Continue Atorvastatin 40 mg tablet daily for management of mixed hyperlipidemia. 5. Peripheral artery disease. Patient will be referred to vascular surgery for evaluation of PAD. 6. Bradycardia. Will decrease Atenolol 50 mg tablet BID to Atenolol 25 mg tablet BID for management of bradycardia. I have discussed the diagnosis with the patient and the intended plan as seen in the above orders. The patient has received an after-visit summary and questions were answered concerning future plans. I have discussed medication side effects and warnings with the patient as well. I have reviewed the plan of care with the patient, accepted their input and they are in agreement with the treatment goals.        Yanique Cade NP  May 12, 2022

## 2022-05-12 NOTE — PROGRESS NOTES
Faviojose Guerrero presents today for   Chief Complaint   Patient presents with    Follow-up     1 month follow up on blood work        Is someone accompanying this pt? No       Is the patient using any DME equipment during OV? No       Depression Screening:  3 most recent PHQ Screens 5/12/2022   Little interest or pleasure in doing things Not at all   Feeling down, depressed, irritable, or hopeless Not at all   Total Score PHQ 2 0       Learning Assessment:  Learning Assessment 9/21/2020   PRIMARY LEARNER Patient   HIGHEST LEVEL OF EDUCATION - PRIMARY LEARNER  GRADUATED HIGH SCHOOL OR GED   BARRIERS PRIMARY LEARNER NONE   CO-LEARNER CAREGIVER No   PRIMARY LANGUAGE ENGLISH   LEARNER PREFERENCE PRIMARY DEMONSTRATION   ANSWERED BY patient   RELATIONSHIP SELF       Fall Risk  Fall Risk Assessment, last 12 mths 5/12/2022   Able to walk? Yes   Fall in past 12 months? 0   Do you feel unsteady? 0   Are you worried about falling 0       ADL  ADL Assessment 4/22/2021   Feeding yourself No Help Needed   Getting from bed to chair No Help Needed   Getting dressed No Help Needed   Bathing or showering No Help Needed   Walk across the room (includes cane/walker) No Help Needed   Using the telphone No Help Needed   Taking your medications No Help Needed   Preparing meals No Help Needed   Managing money (expenses/bills) No Help Needed   Moderately strenuous housework (laundry) No Help Needed   Shopping for personal items (toiletries/medicines) No Help Needed   Shopping for groceries No Help Needed   Driving No Help Needed   Climbing a flight of stairs No Help Needed   Getting to places beyond walking distances No Help Needed       Travel Screening:    Travel Screening     Question   Response    In the last 10 days, have you been in contact with someone who was confirmed or suspected to have Coronavirus/COVID-19? No / Unsure    Have you had a COVID-19 viral test in the last 10 days?   No    Do you have any of the following new or worsening symptoms? None of these    Have you traveled internationally or domestically in the last month? No      Travel History   Travel since 04/12/22    No documented travel since 04/12/22         Health Maintenance reviewed and discussed and ordered per Provider. Health Maintenance Due   Topic Date Due    Shingrix Vaccine Age 49> (1 of 2) Never done    Pneumococcal 65+ years (2 - PCV) 03/17/2015    Medicare Yearly Exam  Never done   . Coordination of Care:  1. \"Have you been to the ER, urgent care clinic since your last visit? Hospitalized since your last visit? \" No    2. \"Have you seen or consulted any other health care providers outside of the [address] [place] [address] since your last visit? \" No     3. For patients aged 39-70: Has the patient had a colonoscopy? Yes - no Care Gap present     If the patient is female:    4. For patients aged 41-77: Has the patient had a mammogram within the past 2 years? Yes - no Care Gap present    5. For patients aged 21-65: Has the patient had a pap smear?  Yes - no Care Gap present

## 2022-05-23 ENCOUNTER — OFFICE VISIT (OUTPATIENT)
Dept: VASCULAR SURGERY | Age: 77
End: 2022-05-23
Payer: MEDICARE

## 2022-05-23 VITALS
HEART RATE: 58 BPM | SYSTOLIC BLOOD PRESSURE: 136 MMHG | OXYGEN SATURATION: 99 % | WEIGHT: 188.05 LBS | HEIGHT: 64 IN | BODY MASS INDEX: 32.11 KG/M2 | DIASTOLIC BLOOD PRESSURE: 84 MMHG

## 2022-05-23 DIAGNOSIS — R09.89 BRUIT OF RIGHT CAROTID ARTERY: Primary | ICD-10-CM

## 2022-05-23 PROCEDURE — G8754 DIAS BP LESS 90: HCPCS | Performed by: NURSE PRACTITIONER

## 2022-05-23 PROCEDURE — G8536 NO DOC ELDER MAL SCRN: HCPCS | Performed by: NURSE PRACTITIONER

## 2022-05-23 PROCEDURE — G8432 DEP SCR NOT DOC, RNG: HCPCS | Performed by: NURSE PRACTITIONER

## 2022-05-23 PROCEDURE — G8752 SYS BP LESS 140: HCPCS | Performed by: NURSE PRACTITIONER

## 2022-05-23 PROCEDURE — 1090F PRES/ABSN URINE INCON ASSESS: CPT | Performed by: NURSE PRACTITIONER

## 2022-05-23 PROCEDURE — G8399 PT W/DXA RESULTS DOCUMENT: HCPCS | Performed by: NURSE PRACTITIONER

## 2022-05-23 PROCEDURE — 1101F PT FALLS ASSESS-DOCD LE1/YR: CPT | Performed by: NURSE PRACTITIONER

## 2022-05-23 PROCEDURE — G8417 CALC BMI ABV UP PARAM F/U: HCPCS | Performed by: NURSE PRACTITIONER

## 2022-05-23 PROCEDURE — 99213 OFFICE O/P EST LOW 20 MIN: CPT | Performed by: NURSE PRACTITIONER

## 2022-05-23 PROCEDURE — G8427 DOCREV CUR MEDS BY ELIG CLIN: HCPCS | Performed by: NURSE PRACTITIONER

## 2022-05-23 NOTE — PROGRESS NOTES
1. Have you been to an emergency room or urgent care clinic since your last visit? No    Hospitalized since your last visit? If yes, where, when, and reason for visit? No    2. Have you seen or consulted any other health care providers outside of the [facility] since your last visit including any procedures, health maintenance items. If yes, where, when and reason for visit?  No

## 2022-05-23 NOTE — PROGRESS NOTES
Chief Complaint   Patient presents with    New Patient    Foot Pain    Swelling         Impression and Plan:  69 y.o. female with bilateral lower extremity edema well controlled with compression and diuretics. She has no signs of arterial insufficiency. No intervention is needed at this time. RTO with carotid duplex. Very mild soft bruit appreciated on the right side. History and Physical    Ba Mustafa is a 69y.o. year old female with a history of chronic kidney disease here as an incoming referral for carotid stenosis and PAD. She reports intermittent BLE swelling which is alleviated with elevation 20 mmHg compression and Lasix. She denies claudication, hyperpigmentation and tissue loss. She endorses bilateral knee pain. Patient denies lateralizing signs and symptoms, including unilateral weakness, paresthesias, vision changes and aphasia. Past Medical History:   Diagnosis Date    Edema of foot 1/11/2016    Hyperlipidemia 6/24/2015    Hypertension, essential 9/20/2016    Menopause     Osteoarthritis of right knee 8/8/2019    Pelvic mass 7/21/2016    Urinary incontinence 6/27/2016     Patient Active Problem List   Diagnosis Code    Hypertension, essential I10    Hyperlipidemia E78.5    Osteoarthritis of right knee M17.11    Pelvic mass R19.00    Urinary incontinence R32    Edema of foot R60.0    Chronic kidney disease N18.9    Vitamin D deficiency E55.9    CKD (chronic kidney disease) stage 4, GFR 15-29 ml/min ([facility]) N18.4     Past Surgical History:   Procedure Laterality Date    HX COLONOSCOPY  07/01/2011    DIVERTICULA IN THE LEFT COLON- RECOMMENDED 5 YEAR F/U    HX HYSTERECTOMY  UNKNOWN    PARTIAL- STILL HAS OVARIES     Current Outpatient Medications   Medication Sig Dispense Refill    atenoloL (TENORMIN) 25 mg tablet TAKE 1 TABLET TWICE A DAY  (NEW DIRECTIONS) 180 Tablet 1    losartan (COZAAR) 25 mg tablet Take 1 Tablet by mouth daily.  90 Tablet 1    furosemide (LASIX) 40 mg tablet Take 1 Tablet by mouth daily. 30 Tablet 2    amLODIPine (NORVASC) 10 mg tablet Take 1 Tablet by mouth daily. 90 Tablet 2    atorvastatin (LIPITOR) 40 mg tablet TAKE 1 TABLET DAILY 90 Tablet 3    cholecalciferol (Vitamin D3) (5000 Units/125 mcg) tab tablet Take 5,000 Units by mouth daily.  aspirin delayed-release 81 mg tablet Take 81 mg by mouth daily. No Known Allergies  Social History     Socioeconomic History    Marital status:      Spouse name: Not on file    Number of children: Not on file    Years of education: Not on file    Highest education level: Not on file   Occupational History    Not on file   Tobacco Use    Smoking status: Never Smoker    Smokeless tobacco: Never Used   Vaping Use    Vaping Use: Never used   Substance and Sexual Activity    Alcohol use: Never    Drug use: Never    Sexual activity: Not on file   Other Topics Concern    Not on file   Social History Narrative    Not on file     Social Determinants of Health     Financial Resource Strain:     Difficulty of Paying Living Expenses: Not on file   Food Insecurity:     Worried About [address] in the Last Year: Not on file    [address] in the Last Year: Not on file   Transportation Needs:     Lack of Transportation (Medical): Not on file    Lack of Transportation (Non-Medical):  Not on file   Physical Activity:     Days of Exercise per Week: Not on file    Minutes of Exercise per Session: Not on file   Stress:     Feeling of Stress : Not on file   Social Connections:     Frequency of Communication with Friends and Family: Not on file    Frequency of Social Gatherings with Friends and Family: Not on file    Attends Mu-ism Services: Not on file    Active Member of Clubs or Organizations: Not on file    Attends Club or Organization Meetings: Not on file    Marital Status: Not on file   Intimate Partner Violence:     Fear of Current or Ex-Partner: Not on file   Rebecca Deleon Emotionally Abused: Not on file    Physically Abused: Not on file    Sexually Abused: Not on file   Housing Stability:     Unable to Pay for Housing in the Last Year: Not on file    Number of Places Lived in the Last Year: Not on file    Unstable Housing in the Last Year: Not on file      Family History   Problem Relation Age of Onset    Breast Cancer Sister     Kidney Disease Brother         KIDNEY TRANSPLANT    OSTEOARTHRITIS Son         MENTALLY DELAYED       Review of Systems    General: negative for fever   Eyes: negative for vision loss   HENT: negative for cold symptoms   Respiratory negative for shortness of breath   Cardiac: negative for chest pain   Vascular negative for foot pain at night    Gastrointestinal: negative for abdominal pain   Genitourinary: negative for dysuria    Endocrine: negative for excessive thirst   Skin: negative for rash   Neurological: negative for paralysis   Psychiatric: negative for depression          Physical Exam:    Visit Vitals  /84 (BP 1 Location: Left upper arm, BP Patient Position: Sitting)   Pulse (!) 58   Ht 5' 4\" (1.626 m)   Wt 188 lb 0.8 oz (85.3 kg)   SpO2 99%   BMI 32.28 kg/m²      Constitutional:  Patient is well developed, well nourished, and not distressed. HEENT: atraumatic, normocephalic, wearing a mask. Eyes:   Cunjunctivae clear, no scleral icterus  Neck:   No JVD present. Cardiovascular:  Normal rate, regular rhythm, normal heart sounds. No murmur heard. Pulmonary/Chest: Effort normal .  Extremities: Normal range of motion. Foot and ankle edema right worse than left. Neurological:  he  is alert and oriented x3 . Gait normal. Motor & sensory grossly intact in all 4 limbs. Psych: Appropriate mood and affect. Skin:  Skin is warm and dry. No ulcerations  Pulses: Palpable pedal    Carotid. R bruit- soft, high pitched        The treatment plan was reviewed with the patient in detail.   The patient voiced understanding of this plan and all questions and concerns were addressed. The patient agrees with this plan. We discussed the signs and symptoms that would require earlier attention or intervention. I appreciate the opportunity to participate in the care of your patient. I will be sure to keep you informed of any subsequent changes in the treatment plan. If you have any questions or concerns, please feel free to contact me.       Jessica Reza [facility]  Vascular Nurse [address]  [phone]

## 2022-06-06 DIAGNOSIS — R09.89 BRUIT OF RIGHT CAROTID ARTERY: ICD-10-CM

## 2022-06-08 ENCOUNTER — OFFICE VISIT (OUTPATIENT)
Dept: VASCULAR SURGERY | Age: 77
End: 2022-06-08

## 2022-06-08 VITALS
DIASTOLIC BLOOD PRESSURE: 82 MMHG | OXYGEN SATURATION: 97 % | SYSTOLIC BLOOD PRESSURE: 130 MMHG | BODY MASS INDEX: 32.11 KG/M2 | HEART RATE: 63 BPM | HEIGHT: 64 IN | WEIGHT: 188.05 LBS

## 2022-06-08 DIAGNOSIS — Z13.6 ENCOUNTER FOR SCREENING FOR STENOSIS OF CAROTID ARTERY: Primary | ICD-10-CM

## 2022-06-08 PROCEDURE — G8432 DEP SCR NOT DOC, RNG: HCPCS | Performed by: NURSE PRACTITIONER

## 2022-06-08 PROCEDURE — G8417 CALC BMI ABV UP PARAM F/U: HCPCS | Performed by: NURSE PRACTITIONER

## 2022-06-08 PROCEDURE — G8754 DIAS BP LESS 90: HCPCS | Performed by: NURSE PRACTITIONER

## 2022-06-08 PROCEDURE — G8752 SYS BP LESS 140: HCPCS | Performed by: NURSE PRACTITIONER

## 2022-06-08 PROCEDURE — G8399 PT W/DXA RESULTS DOCUMENT: HCPCS | Performed by: NURSE PRACTITIONER

## 2022-06-08 PROCEDURE — G8427 DOCREV CUR MEDS BY ELIG CLIN: HCPCS | Performed by: NURSE PRACTITIONER

## 2022-06-08 PROCEDURE — 1101F PT FALLS ASSESS-DOCD LE1/YR: CPT | Performed by: NURSE PRACTITIONER

## 2022-06-08 PROCEDURE — 1090F PRES/ABSN URINE INCON ASSESS: CPT | Performed by: NURSE PRACTITIONER

## 2022-06-08 PROCEDURE — 1123F ACP DISCUSS/DSCN MKR DOCD: CPT | Performed by: NURSE PRACTITIONER

## 2022-06-08 PROCEDURE — G8536 NO DOC ELDER MAL SCRN: HCPCS | Performed by: NURSE PRACTITIONER

## 2022-06-08 PROCEDURE — 99213 OFFICE O/P EST LOW 20 MIN: CPT | Performed by: NURSE PRACTITIONER

## 2022-06-08 NOTE — PROGRESS NOTES
1. Have you been to the ER, urgent care clinic since your last visit? No      Hospitalized since your last visit? No     2. Have you seen or consulted any other health care providers outside of the [address] [place] [address] since your last visit? Include any pap smears or colon screening.   No

## 2022-06-08 NOTE — PROGRESS NOTES
Chief Complaint   Patient presents with    Carotid Artery Stenosis         Impression and Plan:  69 y.o. female with bilateral lower extremity edema well controlled with compression and diuretics. She has no signs of arterial insufficiency or carotid stenosis. No intervention is needed at this time. Carotid arteries can be monitored by PCP     History and Physical    Pop Monroy is a 69y.o. year old female with a history of chronic kidney disease here for review of her Carotid Duplex for a carotid stenosis screening. Patient denies lateralizing signs and symptoms, including unilateral weakness, paresthesias, vision changes and aphasia. Her carotid duplex indicated patent ICA with no evidence of stenosis. Past Medical History:   Diagnosis Date    Edema of foot 1/11/2016    Hyperlipidemia 6/24/2015    Hypertension, essential 9/20/2016    Menopause     Osteoarthritis of right knee 8/8/2019    Pelvic mass 7/21/2016    Urinary incontinence 6/27/2016     Patient Active Problem List   Diagnosis Code    Hypertension, essential I10    Hyperlipidemia E78.5    Osteoarthritis of right knee M17.11    Pelvic mass R19.00    Urinary incontinence R32    Edema of foot R60.0    Chronic kidney disease N18.9    Vitamin D deficiency E55.9    CKD (chronic kidney disease) stage 4, GFR 15-29 ml/min ([facility]) N18.4     Past Surgical History:   Procedure Laterality Date    HX COLONOSCOPY  07/01/2011    DIVERTICULA IN THE LEFT COLON- RECOMMENDED 5 YEAR F/U    HX HYSTERECTOMY  UNKNOWN    PARTIAL- STILL HAS OVARIES     Current Outpatient Medications   Medication Sig Dispense Refill    atenoloL (TENORMIN) 25 mg tablet TAKE 1 TABLET TWICE A DAY  (NEW DIRECTIONS) 180 Tablet 1    losartan (COZAAR) 25 mg tablet Take 1 Tablet by mouth daily. 90 Tablet 1    furosemide (LASIX) 40 mg tablet Take 1 Tablet by mouth daily. 30 Tablet 2    amLODIPine (NORVASC) 10 mg tablet Take 1 Tablet by mouth daily.  90 Tablet 2    atorvastatin (LIPITOR) 40 mg tablet TAKE 1 TABLET DAILY 90 Tablet 3    cholecalciferol (Vitamin D3) (5000 Units/125 mcg) tab tablet Take 5,000 Units by mouth daily.  aspirin delayed-release 81 mg tablet Take 81 mg by mouth daily. Not on File  Social History     Socioeconomic History    Marital status:      Spouse name: Not on file    Number of children: Not on file    Years of education: Not on file    Highest education level: Not on file   Occupational History    Not on file   Tobacco Use    Smoking status: Never Smoker    Smokeless tobacco: Never Used   Vaping Use    Vaping Use: Never used   Substance and Sexual Activity    Alcohol use: Never    Drug use: Never    Sexual activity: Not on file   Other Topics Concern    Not on file   Social History Narrative    Not on file     Social Determinants of Health     Financial Resource Strain:     Difficulty of Paying Living Expenses: Not on file   Food Insecurity:     Worried About [address] in the Last Year: Not on file    [address] in the Last Year: Not on file   Transportation Needs:     Lack of Transportation (Medical): Not on file    Lack of Transportation (Non-Medical):  Not on file   Physical Activity:     Days of Exercise per Week: Not on file    Minutes of Exercise per Session: Not on file   Stress:     Feeling of Stress : Not on file   Social Connections:     Frequency of Communication with Friends and Family: Not on file    Frequency of Social Gatherings with Friends and Family: Not on file    Attends [employer] Services: Not on file    Active Member of Clubs or Organizations: Not on file    Attends Club or Organization Meetings: Not on file    Marital Status: Not on file   Intimate Partner Violence:     Fear of Current or Ex-Partner: Not on file    Emotionally Abused: Not on file    Physically Abused: Not on file    Sexually Abused: Not on file   Housing Stability:     Unable to Pay for Housing in the Last Year: Not on file    Number of Places Lived in the Last Year: Not on file    Unstable Housing in the Last Year: Not on file      Family History   Problem Relation Age of Onset    Breast Cancer Sister     Kidney Disease Brother         KIDNEY TRANSPLANT    OSTEOARTHRITIS Son         MENTALLY DELAYED       Review of Systems    General: negative for fever   Eyes: negative for vision loss   HENT: negative for cold symptoms   Respiratory negative for shortness of breath   Cardiac: negative for chest pain   Vascular negative for foot pain at night    Gastrointestinal: negative for abdominal pain   Genitourinary: negative for dysuria    Endocrine: negative for excessive thirst   Skin: negative for rash   Neurological: negative for paralysis   Psychiatric: negative for depression          Physical Exam:    Visit Vitals  /82 (BP 1 Location: Left arm, BP Patient Position: Sitting)   Pulse 63   Ht 5' 4\" (1.626 m)   Wt 188 lb 0.8 oz (85.3 kg)   SpO2 97%   BMI 32.28 kg/m²      Constitutional:  Patient is well developed, well nourished, and not distressed. HEENT: atraumatic, normocephalic, wearing a mask. Eyes:   Cunjunctivae clear, no scleral icterus  Neck:   No JVD present. Cardiovascular:  Normal rate, regular rhythm, normal heart sounds. No murmur heard. Pulmonary/Chest: Effort normal .  Extremities: Normal range of motion. Foot and ankle edema right worse than left. Neurological:  she  is alert and oriented x3 . Gait normal. Motor & sensory grossly intact in all 4 limbs. Psych: Appropriate mood and affect. Skin:  Skin is warm and dry. No ulcerations  Pulses: Palpable pedal    Carotid. R bruit- soft, high pitched        The treatment plan was reviewed with the patient in detail. The patient voiced understanding of this plan and all questions and concerns were addressed. The patient agrees with this plan.   We discussed the signs and symptoms that would require earlier attention or intervention. I appreciate the opportunity to participate in the care of your patient. I will be sure to keep you informed of any subsequent changes in the treatment plan. If you have any questions or concerns, please feel free to contact me.       Colin Taylor [facility]  Vascular Nurse [address]  [phone]

## 2022-06-09 LAB
LEFT CCA DIST DIAS: 20.8 CM/S
LEFT CCA DIST SYS: 78.5 CM/S
LEFT CCA MID DIAS: 26.1 CM/S
LEFT CCA MID SYS: 118.3 CM/S
LEFT CCA PROX DIAS: 24.9 CM/S
LEFT CCA PROX SYS: 211.4 CM/S
LEFT ECA DIAS: 10.9 CM/S
LEFT ECA SYS: 68 CM/S
LEFT ICA DIST DIAS: 23 CM/S
LEFT ICA DIST SYS: 68.2 CM/S
LEFT ICA MID DIAS: 25.7 CM/S
LEFT ICA MID SYS: 83.8 CM/S
LEFT ICA PROX DIAS: 21.9 CM/S
LEFT ICA PROX SYS: 71.3 CM/S
LEFT ICA/CCA SYS: 1.1 NO UNITS
LEFT VERTEBRAL DIAS: 16.4 CM/S
LEFT VERTEBRAL SYS: 53.7 CM/S
RIGHT CCA DIST DIAS: 27.3 CM/S
RIGHT CCA DIST SYS: 102.9 CM/S
RIGHT CCA PROX DIAS: 18.1 CM/S
RIGHT CCA PROX SYS: 95.1 CM/S
RIGHT ECA DIAS: 18.1 CM/S
RIGHT ECA SYS: 84.8 CM/S
RIGHT ICA DIST DIAS: 21.9 CM/S
RIGHT ICA DIST SYS: 70 CM/S
RIGHT ICA MID DIAS: 22.8 CM/S
RIGHT ICA MID SYS: 67.4 CM/S
RIGHT ICA PROX DIAS: 21.7 CM/S
RIGHT ICA PROX SYS: 100.3 CM/S
RIGHT ICA/CCA SYS: 1 NO UNITS
RIGHT VERTEBRAL DIAS: 12 CM/S
RIGHT VERTEBRAL SYS: 61.4 CM/S
VAS LEFT SUBCLAVIAN PROX EDV: 2.4 CM/S
VAS LEFT SUBCLAVIAN PROX PSV: 155.6 CM/S
VAS RIGHT SUBCLAVIAN PROX EDV: 8.6 CM/S
VAS RIGHT SUBCLAVIAN PROX PSV: 140.2 CM/S

## 2022-06-23 ENCOUNTER — OFFICE VISIT (OUTPATIENT)
Dept: FAMILY MEDICINE CLINIC | Age: 77
End: 2022-06-23
Payer: MEDICARE

## 2022-06-23 VITALS
WEIGHT: 180.8 LBS | HEART RATE: 60 BPM | DIASTOLIC BLOOD PRESSURE: 71 MMHG | HEIGHT: 64 IN | SYSTOLIC BLOOD PRESSURE: 136 MMHG | OXYGEN SATURATION: 99 % | RESPIRATION RATE: 16 BRPM | BODY MASS INDEX: 30.87 KG/M2

## 2022-06-23 DIAGNOSIS — N18.4 CKD (CHRONIC KIDNEY DISEASE) STAGE 4, GFR 15-29 ML/MIN (HCC): ICD-10-CM

## 2022-06-23 DIAGNOSIS — Z00.00 MEDICARE ANNUAL WELLNESS VISIT, SUBSEQUENT: Primary | ICD-10-CM

## 2022-06-23 DIAGNOSIS — E78.2 MIXED HYPERLIPIDEMIA: ICD-10-CM

## 2022-06-23 DIAGNOSIS — I10 HYPERTENSION, ESSENTIAL: ICD-10-CM

## 2022-06-23 PROCEDURE — G8417 CALC BMI ABV UP PARAM F/U: HCPCS | Performed by: NURSE PRACTITIONER

## 2022-06-23 PROCEDURE — G8536 NO DOC ELDER MAL SCRN: HCPCS | Performed by: NURSE PRACTITIONER

## 2022-06-23 PROCEDURE — G8752 SYS BP LESS 140: HCPCS | Performed by: NURSE PRACTITIONER

## 2022-06-23 PROCEDURE — 99214 OFFICE O/P EST MOD 30 MIN: CPT | Performed by: NURSE PRACTITIONER

## 2022-06-23 PROCEDURE — G8754 DIAS BP LESS 90: HCPCS | Performed by: NURSE PRACTITIONER

## 2022-06-23 PROCEDURE — G8432 DEP SCR NOT DOC, RNG: HCPCS | Performed by: NURSE PRACTITIONER

## 2022-06-23 PROCEDURE — G0439 PPPS, SUBSEQ VISIT: HCPCS | Performed by: NURSE PRACTITIONER

## 2022-06-23 PROCEDURE — G8399 PT W/DXA RESULTS DOCUMENT: HCPCS | Performed by: NURSE PRACTITIONER

## 2022-06-23 PROCEDURE — 1123F ACP DISCUSS/DSCN MKR DOCD: CPT | Performed by: NURSE PRACTITIONER

## 2022-06-23 PROCEDURE — 1101F PT FALLS ASSESS-DOCD LE1/YR: CPT | Performed by: NURSE PRACTITIONER

## 2022-06-23 PROCEDURE — G8427 DOCREV CUR MEDS BY ELIG CLIN: HCPCS | Performed by: NURSE PRACTITIONER

## 2022-06-23 NOTE — PROGRESS NOTES
History of Present Illness  Sanjuanita Donahue is a 69 y.o. female who presents today for:    Chief Complaint   Patient presents with    Follow-up     test results     Past Medical History  Past Medical History:   Diagnosis Date    Edema of foot 1/11/2016    Hyperlipidemia 6/24/2015    Hypertension, essential 9/20/2016    Menopause     Osteoarthritis of right knee 8/8/2019    Pelvic mass 7/21/2016    Urinary incontinence 6/27/2016        Surgical History  Past Surgical History:   Procedure Laterality Date    HX COLONOSCOPY  07/01/2011    DIVERTICULA IN THE LEFT COLON- RECOMMENDED 5 YEAR F/U    HX HYSTERECTOMY  UNKNOWN    PARTIAL- STILL HAS OVARIES        Current Medications  Current Outpatient Medications   Medication Sig    atenoloL (TENORMIN) 25 mg tablet TAKE 1 TABLET TWICE A DAY  (NEW DIRECTIONS)    losartan (COZAAR) 25 mg tablet Take 1 Tablet by mouth daily.  furosemide (LASIX) 40 mg tablet Take 1 Tablet by mouth daily.  amLODIPine (NORVASC) 10 mg tablet Take 1 Tablet by mouth daily.  atorvastatin (LIPITOR) 40 mg tablet TAKE 1 TABLET DAILY    cholecalciferol (Vitamin D3) (5000 Units/125 mcg) tab tablet Take 5,000 Units by mouth daily.  aspirin delayed-release 81 mg tablet Take 81 mg by mouth daily. No current facility-administered medications for this visit.        Allergies/Drug Reactions  No Known Allergies     Family History  Family History   Problem Relation Age of Onset    Breast Cancer Sister     Kidney Disease Brother         KIDNEY TRANSPLANT    OSTEOARTHRITIS Son         MENTALLY DELAYED        Social History  Social History     Tobacco Use    Smoking status: Never Smoker    Smokeless tobacco: Never Used   Vaping Use    Vaping Use: Never used   Substance Use Topics    Alcohol use: Never    Drug use: Never        Health Maintenance   Topic Date Due    Shingrix Vaccine Age 50> (1 of 2) Never done    Pneumococcal 65+ years (2 - PCV) 03/17/2015    Medicare Yearly Exam Never done    Lipid Screen  04/11/2023    Depression Screen  06/08/2023    DTaP/Tdap/Td series (2 - Td or Tdap) 03/17/2024    Hepatitis C Screening  Completed    Bone Densitometry (Dexa) Screening  Completed    Flu Vaccine  Completed    COVID-19 Vaccine  Completed     Immunization History   Administered Date(s) Administered    COVID-19, Moderna Booster, PF, 0.25mL Dose 11/01/2021    COVID-19, Moderna, Primary or Immunocompromised Series, MRNA, PF, 100mcg/0.5mL 01/21/2021, 02/15/2021    Influenza Vaccine 11/01/2016, 11/14/2017, 10/30/2018, 11/04/2019, 10/12/2020    Influenza Vaccine (Quad) PF (>6 Mo Flulaval, Fluarix, and >3 Yrs Afluria, Fluzone [id number]) 11/16/2020, 11/16/2020    Influenza, Quadrivalent, Adjuvanted (>65 Yrs FLUAD QUAD [id number]) 10/12/2020, 10/12/2021    Pneumococcal Polysaccharide (PPSV-23) 03/17/2014    Tdap 03/17/2014     Physical Exam  Vital signs:   Vitals:    06/23/22 1104   BP: 136/71   Pulse: 60   Resp: 16   SpO2: 99%   Weight: 180 lb 12.8 oz (82 kg)   Height: 5' 4\" (1.626 m)     Laboratory/Tests:  Orders Only on 06/06/2022   Component Date Value Ref Range Status    Right CCA prox sys 06/08/2022 95.1  cm/s Final    Right CCA prox francois 06/08/2022 18.1  cm/s Final    Right cca dist sys 06/08/2022 102.9  cm/s Final    Right CCA dist francois 06/08/2022 27.3  cm/s Final    Right vertebral sys 06/08/2022 61.4  cm/s Final    RIGHT VERTEBRAL ARTERY D 06/08/2022 12.00  cm/s Final    Right eca sys 06/08/2022 84.8  cm/s Final    RIGHT EXTERNAL CAROTID ARTERY D 06/08/2022 18.10  cm/s Final    Right ICA prox sys 06/08/2022 100.3  cm/s Final    Right ICA prox francois 06/08/2022 21.7  cm/s Final    Right ICA mid sys 06/08/2022 67.4  cm/s Final    Right ICA mid fracnois 06/08/2022 22.8  cm/s Final    Right ICA dist sys 06/08/2022 70.0  cm/s Final    Right ICA dist francois 06/08/2022 21.9  cm/s Final    Right subclavian prox PSV 06/08/2022 140.2  cm/s Final    Right subclavian prox EDV 06/08/2022 8.6  cm/s Final    Right ICA/CCA sys 06/08/2022 1.0  no units Final    Left CCA prox sys 06/08/2022 211.4  cm/s Final    Left CCA prox francois 06/08/2022 24.9  cm/s Final    LEFT COMMON CAROTID ARTERY MID S 06/08/2022 118.30  cm/s Final    LEFT COMMON CAROTID ARTERY MID D 06/08/2022 26.10  cm/s Final    Left CCA dist sys 06/08/2022 78.5  cm/s Final    Left CCA dist francois 06/08/2022 20.8  cm/s Final    Left vertebral sys 06/08/2022 53.7  cm/s Final    LEFT VERTEBRAL ARTERY D 06/08/2022 16.40  cm/s Final    Left ECA sys 06/08/2022 68.0  cm/s Final    LEFT EXTERNAL CAROTID ARTERY D 06/08/2022 10.90  cm/s Final    Left ICA prox sys 06/08/2022 71.3  cm/s Final    Left ICA prox francois 06/08/2022 21.9  cm/s Final    Left ICA mid sys 06/08/2022 83.8  cm/s Final    Left ICA mid francois 06/08/2022 25.7  cm/s Final    Left ICA dist sys 06/08/2022 68.2  cm/s Final    Left ICA dist francois 06/08/2022 23.0  cm/s Final    Left subclavian prox PSV 06/08/2022 155.6  cm/s Final    Left subclavian prox EDV 06/08/2022 2.4  cm/s Final    Left ICA/CCA sys 06/08/2022 1.10  no units Final   Hospital Outpatient Visit on 04/11/2022   Component Date Value Ref Range Status    LIPID PROFILE 04/11/2022      Final    Cholesterol, total 04/11/2022 162  <200 mg/dL Final    Triglyceride 04/11/2022 101  <150 mg/dL Final    Comment: The drugs N-acetylcysteine (NAC) and  Metamiszole have been found to cause falsely  low results in this chemical assay. Please  be sure to submit blood samples obtained  BEFORE administration of either of these  drugs to assure correct results.       HDL Cholesterol 04/11/2022 49  40 - 60 mg/dL Final    LDL, calculated 04/11/2022 92.8  0 - 100 mg/dL Final    VLDL, calculated 04/11/2022 20.2  mg/dL Final    CHOL/HDL Ratio 04/11/2022 3.3  0 - 5.0   Final    WBC 04/11/2022 7.2  4.6 - 13.2 K/uL Final    RBC 04/11/2022 4.72  4.20 - 5.30 M/uL Final    HGB 04/11/2022 12.0  12.0 - 16.0 g/dL Final    HCT 04/11/2022 40.1  35.0 - 45.0 % Final    MCV 04/11/2022 85.0  78.0 - 100.0 FL Final    MCH 04/11/2022 25.4  24.0 - 34.0 PG Final    MCHC 04/11/2022 29.9* 31.0 - 37.0 g/dL Final    RDW 04/11/2022 16.2* 11.6 - 14.5 % Final    PLATELET 09/05/2249 436  135 - 420 K/uL Final    MPV 04/11/2022 11.6  9.2 - 11.8 FL Final    NRBC 04/11/2022 0.0  0.0  WBC Final    ABSOLUTE NRBC 04/11/2022 0.00  0.00 - 0.01 K/uL Final    NEUTROPHILS 04/11/2022 66  40 - 73 % Final    LYMPHOCYTES 04/11/2022 23  21 - 52 % Final    MONOCYTES 04/11/2022 8  3 - 10 % Final    EOSINOPHILS 04/11/2022 2  0 - 5 % Final    BASOPHILS 04/11/2022 1  0 - 2 % Final    IMMATURE GRANULOCYTES 04/11/2022 0  0 - 0.5 % Final    ABS. NEUTROPHILS 04/11/2022 4.8  1.8 - 8.0 K/UL Final    ABS. LYMPHOCYTES 04/11/2022 1.6  0.9 - 3.6 K/UL Final    ABS. MONOCYTES 04/11/2022 0.6  0.05 - 1.2 K/UL Final    ABS. EOSINOPHILS 04/11/2022 0.1  0.0 - 0.4 K/UL Final    ABS. BASOPHILS 04/11/2022 0.1  0.0 - 0.1 K/UL Final    ABS. IMM. GRANS. 04/11/2022 0.0  0.00 - 0.04 K/UL Final    DF 04/11/2022 AUTOMATED    Final    Vitamin B12 04/11/2022 413  211 - 911 pg/mL Final    Folate 04/11/2022 6.0  3.10 - 17.50 ng/mL Final    Hemoglobin A1c 04/11/2022 6.4* 4.2 - 5.6 % Final    Comment: (NOTE)  HbA1C Interpretive Ranges  <5.7              Normal  5.7 - 6.4         Consider Prediabetes  >6.5              Consider Diabetes      Est. average glucose 04/11/2022 137  mg/dL Final    Comment: (NOTE)  The eAG should be interpreted with patient characteristics in mind   since ethnicity, interindividual differences, red cell lifespan,   variation in rates of glycation, etc. may affect the validity of the   calculation.  Hepatitis C virus Ab 04/11/2022 0.07  <0.80 Index Final    Hep C virus Ab Interp. 04/11/2022 Negative  Negative Final    Hep C  virus Ab comment 04/11/2022 Index <0.80. ......................... Dario Hammer Negative   Final    Comment: Index > or = to 0.80 and <1. 00..... Donzell Pyo Donzell Pyo Equivocal  Index >1.00. ......................... Donzell Pyo Positive        For Equivocal or Positive results, confirmation with Hepatitis C  RNA by PCR or bDNA is suggested.  Vitamin D 25-Hydroxy 04/11/2022 82.6  30 - 100 ng/mL Final    Comment: (NOTE)  Deficiency               <20 ng/mL  Insufficiency          20-30 ng/mL  Sufficient             ng/mL  Possible toxicity       >100 ng/mL    The Method used is Siemens Advia Centaur currently standardized to a   Center of Disease Control and Prevention (CDC) certified reference   [address]. Samples containing fluorescein dye can produce falsely   elevated values when tested with the ADVIA Centaur Vitamin D Assay. It is recommended that results in the toxic range, >100 ng/mL, be   retested 72 hours post fluorescein exposure.  TSH 04/11/2022 2.06  0.35 - 6.20 uIU/mL Final    Comment:    Due to TSH heterogeneity, both structurally and degree of glycosylation, monoclonal antibodies used in the TSH assay may not accurately quantitate TSH. Therefore, this result should be correlated with clinical findings as well as with other assessments of thyroid function, e.g., free T4, free T3. Patient was evaluated by vascular surgeon after referral at last visit due to carotid bruit. Venous Duplex of carotid arteries impression revealed no significant stenosis and patent proximal subclavian arteries bilaterally with multiphasic flow. Patient will follow up with nephrology, Dr. Garrison Peterson, on 7/7/2022 due to CKD stage IV. Assessment/Plan:    1. Essential hypertension. Continue Amlodipine 10 mg tablet daily, Furosemide 40 mg tablet daily, Losartan 25 mg tablet daily and Atenolol 25 mg tablet twice daily for management of essential hypertension. 2.  CKD stage IV. Patient will follow up with nephrology as directed for management of CKD stage IV. 3.  Mixed hyperlipidemia.   Continue Atorvastatin 40 mg tablet daily for management of mixed hyperlipidemia. I have discussed the diagnosis with the patient and the intended plan as seen in the above orders. The patient has received an after-visit summary and questions were answered concerning future plans. I have discussed medication side effects and warnings with the patient as well. I have reviewed the plan of care with the patient, accepted their input and they are in agreement with the treatment goals. Rosa Moody NP  June 23, 2022     This is the Subsequent Medicare Annual Wellness Exam, performed 12 months or more after the Initial AWV or the last Subsequent AWV    I have reviewed the patient's medical history in detail and updated the computerized patient record. Assessment/Plan   Education and counseling provided:  Are appropriate based on today's review and evaluation  End-of-Life planning (with patient's consent)    1. Medicare annual wellness visit, subsequent       Depression Risk Factor Screening     3 most recent PHQ Screens 6/23/2022   Little interest or pleasure in doing things Not at all   Feeling down, depressed, irritable, or hopeless Not at all   Total Score PHQ 2 0       Alcohol & Drug Abuse Risk Screen    Do you average more than 1 drink per night or more than 7 drinks a week:  No    On any one occasion in the past three months have you have had more than 3 drinks containing alcohol:  No          Functional Ability and Level of Safety    Hearing: Hearing is good. Activities of Daily Living: The home contains: no safety equipment. Patient does total self care      Ambulation: with no difficulty     Fall Risk:  Fall Risk Assessment, last 12 mths 6/8/2022   Able to walk? Yes   Fall in past 12 months? 0   Do you feel unsteady?  0   Are you worried about falling 0      Abuse Screen:  Patient is not abused       Cognitive Screening    Has your family/caregiver stated any concerns about your memory: no     Cognitive Screening: Normal - Clock Drawing Test    Health Maintenance Due     Health Maintenance Due   Topic Date Due    Shingrix Vaccine Age 49> (1 of 2) Never done    Pneumococcal 65+ years (2 - PCV) 03/17/2015    Medicare Yearly Exam  Never done       Patient Care Team   Patient Care Team:  Rose Call NP as PCP - General (Nurse Practitioner)  Rose Call NP as PCP - [facility] Empaneled Provider    History     Patient Active Problem List   Diagnosis Code    Hypertension, essential I10    Hyperlipidemia E78.5    Osteoarthritis of right knee M17.11    Pelvic mass R19.00    Urinary incontinence R32    Edema of foot R60.0    Chronic kidney disease N18.9    Vitamin D deficiency E55.9    CKD (chronic kidney disease) stage 4, GFR 15-29 ml/min ([facility] [address] N18.4     Past Medical History:   Diagnosis Date    Edema of foot 1/11/2016    Hyperlipidemia 6/24/2015    Hypertension, essential 9/20/2016    Menopause     Osteoarthritis of right knee 8/8/2019    Pelvic mass 7/21/2016    Urinary incontinence 6/27/2016      Past Surgical History:   Procedure Laterality Date    HX COLONOSCOPY  07/01/2011    DIVERTICULA IN THE LEFT COLON- RECOMMENDED 5 YEAR F/U    HX HYSTERECTOMY  UNKNOWN    PARTIAL- STILL HAS OVARIES     Current Outpatient Medications   Medication Sig Dispense Refill    atenoloL (TENORMIN) 25 mg tablet TAKE 1 TABLET TWICE A DAY  (NEW DIRECTIONS) 180 Tablet 1    losartan (COZAAR) 25 mg tablet Take 1 Tablet by mouth daily. 90 Tablet 1    furosemide (LASIX) 40 mg tablet Take 1 Tablet by mouth daily. 30 Tablet 2    amLODIPine (NORVASC) 10 mg tablet Take 1 Tablet by mouth daily. 90 Tablet 2    atorvastatin (LIPITOR) 40 mg tablet TAKE 1 TABLET DAILY 90 Tablet 3    cholecalciferol (Vitamin D3) (5000 Units/125 mcg) tab tablet Take 5,000 Units by mouth daily.  aspirin delayed-release 81 mg tablet Take 81 mg by mouth daily.        No Known Allergies    Family History   Problem Relation Age of Onset    Breast Cancer Sister    Isela Marti Kidney Disease Brother         KIDNEY TRANSPLANT    OSTEOARTHRITIS Son         MENTALLY DELAYED     Social History     Tobacco Use    Smoking status: Never Smoker    Smokeless tobacco: Never Used   Substance Use Topics    Alcohol use: Never         Daisy Zamorano NP     Health Maintenance Due     Health Maintenance Due   Topic Date Due    Shingrix Vaccine Age 49> (1 of 2) Never done    Pneumococcal 65+ years (2 - PCV) 03/17/2015    Medicare Yearly Exam  Never done       Patient Care Team   Patient Care Team:  Elza Aguillon NP as PCP - General (Nurse Practitioner)  Elza Aguillon NP as PCP - [facility] Provider    History     Patient Active Problem List   Diagnosis Code    Hypertension, essential I10    Hyperlipidemia E78.5    Osteoarthritis of right knee M17.11    Pelvic mass R19.00    Urinary incontinence R32    Edema of foot R60.0    Chronic kidney disease N18.9    Vitamin D deficiency E55.9    CKD (chronic kidney disease) stage 4, GFR 15-29 ml/min ([facility] [address] N18.4     Past Medical History:   Diagnosis Date    Edema of foot 1/11/2016    Hyperlipidemia 6/24/2015    Hypertension, essential 9/20/2016    Menopause     Osteoarthritis of right knee 8/8/2019    Pelvic mass 7/21/2016    Urinary incontinence 6/27/2016      Past Surgical History:   Procedure Laterality Date    HX COLONOSCOPY  07/01/2011    DIVERTICULA IN THE LEFT COLON- RECOMMENDED 5 YEAR F/U    HX HYSTERECTOMY  UNKNOWN    PARTIAL- STILL HAS OVARIES     Current Outpatient Medications   Medication Sig Dispense Refill    atenoloL (TENORMIN) 25 mg tablet TAKE 1 TABLET TWICE A DAY  (NEW DIRECTIONS) 180 Tablet 1    losartan (COZAAR) 25 mg tablet Take 1 Tablet by mouth daily. 90 Tablet 1    furosemide (LASIX) 40 mg tablet Take 1 Tablet by mouth daily. 30 Tablet 2    amLODIPine (NORVASC) 10 mg tablet Take 1 Tablet by mouth daily.  90 Tablet 2    atorvastatin (LIPITOR) 40 mg tablet TAKE 1 TABLET DAILY 90 Tablet 3    cholecalciferol (Vitamin D3) (5000 Units/125 mcg) tab tablet Take 5,000 Units by mouth daily.  aspirin delayed-release 81 mg tablet Take 81 mg by mouth daily.        No Known Allergies    Family History   Problem Relation Age of Onset    Breast Cancer Sister     Kidney Disease Brother         KIDNEY TRANSPLANT    OSTEOARTHRITIS Son         MENTALLY DELAYED     Social History     Tobacco Use    Smoking status: Never Smoker    Smokeless tobacco: Never Used   Substance Use Topics    Alcohol use: Never         Jina Rojas NP

## 2022-06-23 NOTE — PROGRESS NOTES
Sukumar Shaw presents today for follow up for test results. Chief Complaint   Patient presents with    Follow-up     medication management       Is someone accompanying this pt? No  Is the patient using any DME equipment during OV? No    Depression Screening:  3 most recent PHQ Screens 6/23/2022   Little interest or pleasure in doing things Not at all   Feeling down, depressed, irritable, or hopeless Not at all   Total Score PHQ 2 0       Learning Assessment:  Learning Assessment 6/8/2022   PRIMARY LEARNER Patient   HIGHEST LEVEL OF EDUCATION - PRIMARY LEARNER  -   BARRIERS PRIMARY LEARNER -   CO-LEARNER CAREGIVER -   PRIMARY LANGUAGE ENGLISH   LEARNER PREFERENCE PRIMARY DEMONSTRATION   ANSWERED BY patient   RELATIONSHIP SELF       Fall Risk  Fall Risk Assessment, last 12 mths 6/8/2022   Able to walk? Yes   Fall in past 12 months? 0   Do you feel unsteady? 0   Are you worried about falling 0       ADL  ADL Assessment 4/22/2021   Feeding yourself No Help Needed   Getting from bed to chair No Help Needed   Getting dressed No Help Needed   Bathing or showering No Help Needed   Walk across the room (includes cane/walker) No Help Needed   Using the telphone No Help Needed   Taking your medications No Help Needed   Preparing meals No Help Needed   Managing money (expenses/bills) No Help Needed   Moderately strenuous housework (laundry) No Help Needed   Shopping for personal items (toiletries/medicines) No Help Needed   Shopping for groceries No Help Needed   Driving No Help Needed   Climbing a flight of stairs No Help Needed   Getting to places beyond walking distances No Help Needed       Health Maintenance reviewed and discussed and ordered per Provider. Health Maintenance Due   Topic Date Due    Shingrix Vaccine Age 49> (1 of 2) Never done    Pneumococcal 65+ years (2 - PCV) 03/17/2015    Medicare Yearly Exam  Never done   . Coordination of Care:  1.  \"Have you been to the ER, urgent care clinic since your last visit? Hospitalized since your last visit? \" No    2. \"Have you seen or consulted any other health care providers outside of the [address] [place] [address] since your last visit? \" No     3. For patients aged 39-70: Has the patient had a colonoscopy? NA - based on age     If the patient is female:    4. For patients aged 41-77: Has the patient had a mammogram within the past 2 years? NA - based on age    11. For patients aged 21-65: Has the patient had a pap smear?  NA - based on age

## 2022-06-24 NOTE — PATIENT INSTRUCTIONS

## 2022-06-30 ENCOUNTER — HOSPITAL ENCOUNTER (OUTPATIENT)
Dept: LAB | Age: 77
Discharge: HOME | End: 2022-06-30
Payer: MEDICARE

## 2022-06-30 DIAGNOSIS — I10 HYPERTENSION, ESSENTIAL: ICD-10-CM

## 2022-06-30 DIAGNOSIS — N18.4 CKD (CHRONIC KIDNEY DISEASE) STAGE 4, GFR 15-29 ML/MIN (HCC): ICD-10-CM

## 2022-06-30 DIAGNOSIS — N17.9 AKI (ACUTE KIDNEY INJURY) (HCC): ICD-10-CM

## 2022-06-30 LAB
ALBUMIN SERPL-MCNC: 3.3 G/DL (ref 3.4–5)
ANION GAP SERPL CALC-SCNC: 6 MMOL/L (ref 3–18)
APPEARANCE UR: ABNORMAL
BACTERIA URNS QL MICRO: ABNORMAL /HPF
BILIRUB UR QL: NEGATIVE
BUN SERPL-MCNC: 47 MG/DL (ref 7–18)
BUN/CREAT SERPL: 18 (ref 12–20)
CA-I BLD-MCNC: 6.1 MG/DL (ref 8.5–10.1)
CHLORIDE SERPL-SCNC: 106 MMOL/L (ref 100–111)
CO2 SERPL-SCNC: 25 MMOL/L (ref 21–32)
COLOR UR: YELLOW
CREAT SERPL-MCNC: 2.67 MG/DL (ref 0.6–1.3)
CREAT UR-MCNC: 113 MG/DL (ref 30–125)
EPITH CASTS URNS QL MICRO: ABNORMAL /LPF (ref 0–20)
GLUCOSE SERPL-MCNC: 108 MG/DL (ref 74–99)
GLUCOSE UR STRIP.AUTO-MCNC: NEGATIVE MG/DL
HGB UR QL STRIP: ABNORMAL
KETONES UR QL STRIP.AUTO: NEGATIVE MG/DL
LEUKOCYTE ESTERASE UR QL STRIP.AUTO: ABNORMAL
NITRITE UR QL STRIP.AUTO: NEGATIVE
PH UR STRIP: 5 [PH] (ref 5–8)
PHOSPHATE SERPL-MCNC: 4.1 MG/DL (ref 2.5–4.9)
POTASSIUM SERPL-SCNC: 3.8 MMOL/L (ref 3.5–5.5)
PROT UR STRIP-MCNC: ABNORMAL MG/DL
PROT UR-MCNC: 28 MG/DL
PROT/CREAT UR-RTO: 0.2
RBC #/AREA URNS HPF: ABNORMAL /HPF (ref 0–2)
SODIUM SERPL-SCNC: 137 MMOL/L (ref 136–145)
SP GR UR REFRACTOMETRY: 1.01 (ref 1–1.03)
UROBILINOGEN UR QL STRIP.AUTO: 0.2 EU/DL (ref 0.2–1)
WBC URNS QL MICRO: ABNORMAL /HPF (ref 0–4)

## 2022-06-30 PROCEDURE — 84156 ASSAY OF PROTEIN URINE: CPT

## 2022-06-30 PROCEDURE — 81001 URINALYSIS AUTO W/SCOPE: CPT

## 2022-06-30 PROCEDURE — 80069 RENAL FUNCTION PANEL: CPT

## 2022-07-01 RX ORDER — FUROSEMIDE 40 MG/1
TABLET ORAL
Qty: 30 TABLET | Refills: 0 | Status: SHIPPED | OUTPATIENT
Start: 2022-07-01

## 2022-07-07 ENCOUNTER — OFFICE VISIT (OUTPATIENT)
Dept: NEPHROLOGY | Age: 77
End: 2022-07-07
Payer: MEDICARE

## 2022-07-07 VITALS
HEIGHT: 64 IN | WEIGHT: 180.8 LBS | BODY MASS INDEX: 30.87 KG/M2 | DIASTOLIC BLOOD PRESSURE: 64 MMHG | HEART RATE: 57 BPM | SYSTOLIC BLOOD PRESSURE: 144 MMHG

## 2022-07-07 DIAGNOSIS — N18.4 CKD (CHRONIC KIDNEY DISEASE) STAGE 4, GFR 15-29 ML/MIN (HCC): ICD-10-CM

## 2022-07-07 DIAGNOSIS — N18.4 CKD (CHRONIC KIDNEY DISEASE) STAGE 4, GFR 15-29 ML/MIN (HCC): Primary | ICD-10-CM

## 2022-07-07 DIAGNOSIS — I10 HYPERTENSION, ESSENTIAL: ICD-10-CM

## 2022-07-07 PROCEDURE — 99214 OFFICE O/P EST MOD 30 MIN: CPT | Performed by: INTERNAL MEDICINE

## 2022-07-07 PROCEDURE — G8510 SCR DEP NEG, NO PLAN REQD: HCPCS | Performed by: INTERNAL MEDICINE

## 2022-07-07 PROCEDURE — G8417 CALC BMI ABV UP PARAM F/U: HCPCS | Performed by: INTERNAL MEDICINE

## 2022-07-07 PROCEDURE — G8754 DIAS BP LESS 90: HCPCS | Performed by: INTERNAL MEDICINE

## 2022-07-07 PROCEDURE — G8399 PT W/DXA RESULTS DOCUMENT: HCPCS | Performed by: INTERNAL MEDICINE

## 2022-07-07 PROCEDURE — 1123F ACP DISCUSS/DSCN MKR DOCD: CPT | Performed by: INTERNAL MEDICINE

## 2022-07-07 PROCEDURE — G8536 NO DOC ELDER MAL SCRN: HCPCS | Performed by: INTERNAL MEDICINE

## 2022-07-07 PROCEDURE — G8427 DOCREV CUR MEDS BY ELIG CLIN: HCPCS | Performed by: INTERNAL MEDICINE

## 2022-07-07 PROCEDURE — G8753 SYS BP > OR = 140: HCPCS | Performed by: INTERNAL MEDICINE

## 2022-07-07 PROCEDURE — 1101F PT FALLS ASSESS-DOCD LE1/YR: CPT | Performed by: INTERNAL MEDICINE

## 2022-07-07 PROCEDURE — 1090F PRES/ABSN URINE INCON ASSESS: CPT | Performed by: INTERNAL MEDICINE

## 2022-07-07 RX ORDER — NITROFURANTOIN 25; 75 MG/1; MG/1
100 CAPSULE ORAL 2 TIMES DAILY
Qty: 6 CAPSULE | Refills: 0 | Status: SHIPPED | OUTPATIENT
Start: 2022-07-07 | End: 2022-10-20

## 2022-07-07 NOTE — PROGRESS NOTES
Renal Consultation Note    NAME:  Jeanie Iqbal   :   1945   MRN:   [id number]     ATTENDING: No admitting provider for patient encounter. PCP:  Abbe Perez NP    Date/Time:  2022 12:58 PM      Subjective:    CC : Patient is here for follow-up of her CKD 4    HPI   patient is here for follow-up today. She denies any complaints today. Blood pressure is 141/64 mmHg. She claims compliance with her medications and low-salt diet. Review of recent records shows creatinine was 2.6. She has discontinued taking diclofenac and Aleve at home. Taking Tylenol for pain whicheems to be working okay    Past Medical History:   Diagnosis Date    Edema of foot 2016    Hyperlipidemia 2015    Hypertension, essential 2016    Menopause     Osteoarthritis of right knee 2019    Pelvic mass 2016    Urinary incontinence 2016      Past Surgical History:   Procedure Laterality Date    HX COLONOSCOPY  2011    DIVERTICULA IN THE LEFT COLON- RECOMMENDED 5 YEAR F/U    HX HYSTERECTOMY  UNKNOWN    PARTIAL- STILL HAS OVARIES     Social History     Tobacco Use    Smoking status: Never Smoker    Smokeless tobacco: Never Used   Substance Use Topics    Alcohol use: Never      Family History   Problem Relation Age of Onset    Breast Cancer Sister     Kidney Disease Brother         KIDNEY TRANSPLANT    OSTEOARTHRITIS Son         MENTALLY DELAYED       No Known Allergies   Prior to Admission medications    Medication Sig Start Date End Date Taking? Authorizing Provider   nitrofurantoin, macrocrystal-monohydrate, (MACROBID) 100 mg capsule Take 1 Capsule by mouth two (2) times a day. 22  Yes Rio Franks MD   furosemide (LASIX) 40 mg tablet Take 1 Tablet by mouth daily as needed (swelling).  22  Yes Rio Franks MD   atenoloL (TENORMIN) 25 mg tablet TAKE 1 TABLET TWICE A DAY  (NEW DIRECTIONS) 22  Yes Elliott BELL, NP   losartan (COZAAR) 25 mg tablet Take 1 Tablet by mouth daily. 5/12/22  Yes Terry Maurer, NP   amLODIPine (NORVASC) 10 mg tablet Take 1 Tablet by mouth daily. 10/7/21  Yes Aneudy Haskins MD   atorvastatin (LIPITOR) 40 mg tablet TAKE 1 TABLET DAILY 8/29/21  Yes Brian Street MD   cholecalciferol (Vitamin D3) (5000 Units/125 mcg) tab tablet Take 5,000 Units by mouth daily. Yes Art, MD Janene   aspirin delayed-release 81 mg tablet Take 81 mg by mouth daily. Yes Other, MD Janene       REVIEW OF SYSTEMS:       Constitutional: Negative for chills and fever. HENT: Negative. Eyes: Negative. Respiratory: Negative. Cardiovascular: Negative. Gastrointestinal: Negative for abdominal pain and nausea. Skin: Negative. Neurological: Negative. Objective:   VITALS:    Visit Vitals  BP (!) 144/64 (BP 1 Location: Left arm, BP Patient Position: Sitting, BP Cuff Size: Adult)   Pulse (!) 57   Ht 5' 4\" (1.626 m)   Wt 82 kg (180 lb 12.8 oz)   BMI 31.03 kg/m²     @TMAX(24)@    PHYSICAL EXAM:     General: NAD, alert, cooperative  Eyes: sclera anicteric  Oral Cavity: No thrush or ulcers  Neck: no JVD  Chest: Fair bilateral air entry. No Wheezing or Rhonchi. No rales. Heart: normal sounds  Abdomen: soft and non tender   : no anne  Lower Extremities: No edema in both legs . [place] [facility]   Good capillary refill   skin: no rash  Neuro: intact, no focals  Psychiatric: non-depressed          LAB DATA REVIEWED:     PTH INTACT   Collected: 03/21/22 0954  Final result  Specimen: Serum     Calcium 9.7 mg/dL PTH, Intact 89.2 High  pg/mL          03/21/22 1220  RENAL FUNCTION PANEL   Collected: 03/21/22 0954  Final result  Specimen: Serum or Plasma    Sodium 138 mmol/L Creatinine 2.60 High  mg/dL   Potassium 4.4 mmol/L BUN/Creatinine ratio 16     Chloride 105 mmol/L GFR est AA 22 ml/min/1.73m2   CO2 19 Low  mmol/L GFR est non-AA 18 ml/min/1.73m2    Anion gap 14 mmol/L Calcium 9.6 mg/dL   Glucose 101 mg/dL Phosphorus 4.5 mg/dL   BUN 42 High  mg/dL Albumin 3.7 g/dL          03/21/22 1215  PROTEIN/CREATININE RATIO, URINE   Collected: 03/21/22 2271  Final result  Specimen: Urine, random    Protein, urine random 30 High  mg/dL Protein/Creat. urine Ratio 0.2     Creatinine, urine 129.30 mg/dL             03/21/22 1200  URINALYSIS W/MICROSCOPIC   Collected: 03/21/22 0954  Final result  Specimen: Urine    Color Yellow    Blood Moderate Abnormal      Appearance Cloudy   Urobilinogen 0.2 EU/dL   Specific gravity 1.014   Nitrites Negative     pH (UA) 5.0   Leukocyte Esterase Large Abnormal      Protein 30 Abnormal  mg/dL WBC  /hpf   Glucose Negative mg/dL RBC 5-10 /hpf   Ketone Negative mg/dL Epithelial cells Many /lpf    Bilirubin Negative   Bacteria 2+ Abnormal  /hpf               PTH INTACT   Collected: 09/30/21 0945  Final result  Specimen: Serum     Calcium 9.8 mg/dL PTH, Intact 64.7 pg/mL          09/30/21 1206  URINALYSIS W/MICROSCOPIC   Collected: 09/30/21 0945  Final result  Specimen: Urine    Color Yellow    Blood Negative     Appearance Cloudy   Urobilinogen 0.2 EU/dL   Specific gravity 1.014   Nitrites Negative     pH (UA) 5.0   Leukocyte Esterase LargeAbnormal      Protein 30Abnormal  mg/dL WBC  /hpf   Glucose Negative mg/dL RBC 0-5 /hpf   Ketone Negative mg/dL Epithelial cells Moderate /lpf    Bilirubin Negative   Bacteria 3+Abnormal  /hpf          09/30/21 1126  PROTEIN/CREATININE RATIO, URINE   Collected: 09/30/21 9474  Final result  Specimen: Urine, random    Protein, urine random 34High  mg/dL Protein/Creat. urine Ratio 0.2     Creatinine, urine 140.70 mg/dL             09/30/21 1126  RENAL FUNCTION PANEL   Collected: 09/30/21 0945  Final result  Specimen: Serum or Plasma    Sodium 136 mmol/L Creatinine 2. 40High  mg/dL   Potassium 3.9 mmol/L BUN/Creatinine ratio 17     Chloride 102 mmol/L GFR est AA 24 ml/min/1.73m2   CO2 22 mmol/L GFR est non-AA 20 ml/min/1.73m2    Anion gap 12 mmol/L Calcium 9.5 mg/dL   Glucose 117High  mg/dL Phosphorus 3.4 mg/dL   BUN 40High  mg/dL Albumin 3.8 g/dL              06/22/21 0336  VITAMIN D, 25 HYDROXY   Collected: 06/21/21 1005  Final result  Specimen: Serum    Vitamin D 25-Hydroxy 85.1 ng/mL             06/21/21 1400  URINALYSIS W/MICROSCOPIC   Collected: 06/21/21 1005  Final result  Specimen: Urine    Color Yellow/Straw    Blood SmallAbnormal      Appearance HazyAbnormal    Urobilinogen 0.2 EU/dL   Specific gravity 1.015   Nitrites Negative     pH (UA) 5.0   Leukocyte Esterase ModerateAbnormal      Protein 30Abnormal  mg/dL WBC  /hpf   Glucose Negative mg/dL RBC 0-5 /hpf   Ketone Negative mg/dL Epithelial cells Moderate /lpf    Bilirubin Negative   Bacteria 2+Abnormal  /hpf          06/21/21 1234  RENAL FUNCTION PANEL   Collected: 06/21/21 1005  Final result  Specimen: Serum or Plasma    Sodium 138 mmol/L Creatinine 2. 40High  mg/dL   Potassium 3.7 mmol/L BUN/Creatinine ratio 19     Chloride 103 mmol/L GFR est AA 24 ml/min/1.73m2   CO2 23 mmol/L GFR est non-AA 20 ml/min/1.73m2    Anion gap 12 mmol/L Calcium 9.4 mg/dL   Glucose 123High  mg/dL Phosphorus 4.3 mg/dL   BUN 46High  mg/dL Albumin 3.6 g/dL          06/21/21 1130  PROTEIN URINE, RANDOM   Collected: 06/21/21 1021  Final result  Specimen: Urine, random    Protein, urine random 39High  mg/dL            06/21/21 1130  PROTEIN/CREATININE RATIO, URINE   Collected: 06/21/21 1003  Final result  Specimen: Urine, random    Protein, urine random 40High  mg/dL Protein/Creat.  urine Ratio 0.3     Creatinine, urine 130.30 mg/dL                 Labs from 2/23/2021 show sodium 138 potassium 3.6 chloride 104 CO2 24 glucose 143 BUN 30 creatinine 2.2 GFR 26 calcium 8.9 hemoglobin 10.9  Labs from 4/16/2021 show sodium 137 potassium 3.5 chloride 101 CO2 27 glucose 140 BUN 40 creatinine 2.4 GFR 24 calcium 8.9    Recommendations/Plan:     #1 acute kidney injury on chronic kidney disease stage IV  -Creatinine has slightly increased from 2.3--> 2.6 on recent labs.  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 Advised her to discontinue all NSAIDs from here on  -Renal ultrasound shows bilateral cysts which are simple. No need for further work-up. Increased echogenicity seen consistent with medical renal disease  -Her losartan was discontinued 2/2 erik. She has minimal proteinuria. I will continue to hold  -Urine is suggestive of UTI. Only 0.2 g proteinuria per day  -Check urine and serum electrophoresis  -Follow-up on renal function in 6 weeks    #2 hypertension  -Blood pressure is well controlled in the office and at home now  -I will continue Norvasc 10 mg, atenolol 50 mg daily which I will continue.   -continue Lasix 40 mg as needed only  -Advised her to be on a low-salt diet which she is not compliant with yet  -I have asked her to call me if the blood pressure is beyond parameters at home    #3 renal osteodystrophy  -Within target intact parathyroid hormone . normal 25-hydroxy vitamin D level  -Calcium and phosphorus are okay    #4 anemia  -hemoglobin is 10.9. Likely anemia of chronic kidney disease.    -No need for MAJO agents yet. I will check iron studies with next labs    #5 right hand swelling  -Significant swelling present in the right hand for the past month  -Does not seem to be cellulitis. Patient thinks it is gout though it seems unlikely. We will check a uric acid level  -We will order a venous Doppler stat to rule out DVT  -I am also calling in Keflex 500 twice daily for 7 days in case it is cellulitis  -I have advised her to go to the ER if swelling does not improve over the next few days with above treatment    #6 UTI  -Urine suggestive of UTI.   I will call in ciprofloxacin 250 mg daily for 3 days    Thank you for the referral.  I will see her back in the office in 2 months with preclinic labs    ________________________________________________________________________  Signed: Arleen Brewer MD        Renal Consultation Note    NAME:  Dalia Lu   :   1945   MRN:   [id number]     ATTENDING: No admitting provider for patient encounter. PCP:  Geraldine Huang NP    Date/Time:  2022 12:58 PM      Subjective:    CC : Patient is here for follow-up of her CKD 4    HPI   patient is here for follow-up today. She denies any complaints today. Blood pressure is 144/64 mmHg. She claims compliance with her medications and low-salt diet. Review of recent records shows creatinine was 2.6. She has discontinued taking diclofenac and Aleve at home. Taking Tylenol for pain         Past Medical History:   Diagnosis Date    Edema of foot 2016    Hyperlipidemia 2015    Hypertension, essential 2016    Menopause     Osteoarthritis of right knee 2019    Pelvic mass 2016    Urinary incontinence 2016      Past Surgical History:   Procedure Laterality Date    HX COLONOSCOPY  2011    DIVERTICULA IN THE LEFT COLON- RECOMMENDED 5 YEAR F/U    HX HYSTERECTOMY  UNKNOWN    PARTIAL- STILL HAS OVARIES     Social History     Tobacco Use    Smoking status: Never Smoker    Smokeless tobacco: Never Used   Substance Use Topics    Alcohol use: Never      Family History   Problem Relation Age of Onset    Breast Cancer Sister     Kidney Disease Brother         KIDNEY TRANSPLANT    OSTEOARTHRITIS Son         MENTALLY DELAYED       No Known Allergies   Prior to Admission medications    Medication Sig Start Date End Date Taking? Authorizing Provider   nitrofurantoin, macrocrystal-monohydrate, (MACROBID) 100 mg capsule Take 1 Capsule by mouth two (2) times a day.  22  Yes Carlo Chow MD   furosemide (LASIX) 40 mg tablet Take 1 Tablet by mouth daily as needed (swelling). 7/1/22  Yes Alessandra Graham MD   atenoloL (TENORMIN) 25 mg tablet TAKE 1 TABLET TWICE A DAY  (NEW DIRECTIONS) 5/12/22  Yes Cassia BELL NP   losartan (COZAAR) 25 mg tablet Take 1 Tablet by mouth daily. 5/12/22  Yes Harry Maurer, TORIBIO   amLODIPine (NORVASC) 10 mg tablet Take 1 Tablet by mouth daily. 10/7/21  Yes Alessandra Graham MD   atorvastatin (LIPITOR) 40 mg tablet TAKE 1 TABLET DAILY 8/29/21  Yes Peak, Fritzi Alpers., MD   cholecalciferol (Vitamin D3) (5000 Units/125 mcg) tab tablet Take 5,000 Units by mouth daily. Yes Art, MD Janene   aspirin delayed-release 81 mg tablet Take 81 mg by mouth daily. Yes Other, MD Janene       REVIEW OF SYSTEMS:       Constitutional: Negative for chills and fever. HENT: Negative. Eyes: Negative. Respiratory: Negative. Cardiovascular: Negative. Gastrointestinal: Negative for abdominal pain and nausea. Skin: Negative. Neurological: Negative. Objective:   VITALS:    Visit Vitals  BP (!) 144/64 (BP 1 Location: Left arm, BP Patient Position: Sitting, BP Cuff Size: Adult)   Pulse (!) 57   Ht 5' 4\" (1.626 m)   Wt 82 kg (180 lb 12.8 oz)   BMI 31.03 kg/m²     @TMAX(24)@    PHYSICAL EXAM:     General: NAD, alert, cooperative  Eyes: sclera anicteric  Oral Cavity: No thrush or ulcers  Neck: no JVD  Chest: Fair bilateral air entry. No Wheezing or Rhonchi. No rales. Heart: normal sounds  Abdomen: soft and non tender   : no anne  Lower Extremities: No edema in both legs . [place]   Good capillary refill   skin: no rash  Neuro: intact, no focals  Psychiatric: non-depressed          LAB DATA REVIEWED:     PTH INTACT   Collected: 03/21/22 0954  Final result  Specimen: Serum     Calcium 9.7 mg/dL PTH, Intact 89.2 High  pg/mL          03/21/22 1220  RENAL FUNCTION PANEL   Collected: 03/21/22 0954  Final result  Specimen: Serum or Plasma    Sodium 138 mmol/L Creatinine 2.60 High  mg/dL   Potassium 4.4 mmol/L BUN/Creatinine ratio 16     Chloride 105 mmol/L GFR est AA 22 ml/min/1.73m2   CO2 19 Low  mmol/L GFR est non-AA 18 ml/min/1.73m2    Anion gap 14 mmol/L Calcium 9.6 mg/dL   Glucose 101 mg/dL Phosphorus 4.5 mg/dL   BUN 42 High  mg/dL Albumin 3.7 g/dL          03/21/22 1215  PROTEIN/CREATININE RATIO, URINE   Collected: 03/21/22 4975  Final result  Specimen: Urine, random    Protein, urine random 30 High  mg/dL Protein/Creat. urine Ratio 0.2     Creatinine, urine 129.30 mg/dL             03/21/22 1200  URINALYSIS W/MICROSCOPIC   Collected: 03/21/22 0954  Final result  Specimen: Urine    Color Yellow    Blood Moderate Abnormal      Appearance Cloudy   Urobilinogen 0.2 EU/dL   Specific gravity 1.014   Nitrites Negative     pH (UA) 5.0   Leukocyte Esterase Large Abnormal      Protein 30 Abnormal  mg/dL WBC  /hpf   Glucose Negative mg/dL RBC 5-10 /hpf   Ketone Negative mg/dL Epithelial cells Many /lpf    Bilirubin Negative   Bacteria 2+ Abnormal  /hpf               PTH INTACT   Collected: 09/30/21 0945  Final result  Specimen: Serum     Calcium 9.8 mg/dL PTH, Intact 64.7 pg/mL          09/30/21 1206  URINALYSIS W/MICROSCOPIC   Collected: 09/30/21 0945  Final result  Specimen: Urine    Color Yellow    Blood Negative     Appearance Cloudy   Urobilinogen 0.2 EU/dL   Specific gravity 1.014   Nitrites Negative     pH (UA) 5.0   Leukocyte Esterase LargeAbnormal      Protein 30Abnormal  mg/dL WBC  /hpf   Glucose Negative mg/dL RBC 0-5 /hpf   Ketone Negative mg/dL Epithelial cells Moderate /lpf    Bilirubin Negative   Bacteria 3+Abnormal  /hpf          09/30/21 1126  PROTEIN/CREATININE RATIO, URINE   Collected: 09/30/21 1628  Final result  Specimen: Urine, random    Protein, urine random 34High  mg/dL Protein/Creat.  urine Ratio 0.2     Creatinine, urine 140.70 mg/dL             09/30/21 1126  RENAL FUNCTION PANEL   Collected: 09/30/21 0945  Final result  Specimen: Serum or Plasma    Sodium 136 mmol/L Creatinine 2. 40High  mg/dL   Potassium 3.9 mmol/L BUN/Creatinine ratio 17     Chloride 102 mmol/L GFR est AA 24 ml/min/1.73m2   CO2 22 mmol/L GFR est non-AA 20 ml/min/1.73m2    Anion gap 12 mmol/L Calcium 9.5 mg/dL   Glucose 117High  mg/dL Phosphorus 3.4 mg/dL   BUN 40High  mg/dL Albumin 3.8 g/dL              06/22/21 0336  VITAMIN D, 25 HYDROXY   Collected: 06/21/21 1005  Final result  Specimen: Serum    Vitamin D 25-Hydroxy 85.1 ng/mL             06/21/21 1400  URINALYSIS W/MICROSCOPIC   Collected: 06/21/21 1005  Final result  Specimen: Urine    Color Yellow/Straw    Blood SmallAbnormal      Appearance HazyAbnormal    Urobilinogen 0.2 EU/dL   Specific gravity 1.015   Nitrites Negative     pH (UA) 5.0   Leukocyte Esterase ModerateAbnormal      Protein 30Abnormal  mg/dL WBC  /hpf   Glucose Negative mg/dL RBC 0-5 /hpf   Ketone Negative mg/dL Epithelial cells Moderate /lpf    Bilirubin Negative   Bacteria 2+Abnormal  /hpf          06/21/21 1234  RENAL FUNCTION PANEL   Collected: 06/21/21 1005  Final result  Specimen: Serum or Plasma    Sodium 138 mmol/L Creatinine 2. 40High  mg/dL   Potassium 3.7 mmol/L BUN/Creatinine ratio 19     Chloride 103 mmol/L GFR est AA 24 ml/min/1.73m2   CO2 23 mmol/L GFR est non-AA 20 ml/min/1.73m2    Anion gap 12 mmol/L Calcium 9.4 mg/dL   Glucose 123High  mg/dL Phosphorus 4.3 mg/dL   BUN 46High  mg/dL Albumin 3.6 g/dL          06/21/21 1130  PROTEIN URINE, RANDOM   Collected: 06/21/21 1021  Final result  Specimen: Urine, random    Protein, urine random 39High  mg/dL            06/21/21 1130  PROTEIN/CREATININE RATIO, URINE   Collected: 06/21/21 1003  Final result  Specimen: Urine, random    Protein, urine random 40High  mg/dL Protein/Creat.  urine Ratio 0.3     Creatinine, urine 130.30 mg/dL                 Labs from 2/23/2021 show sodium 138 potassium 3.6 chloride 104 CO2 24 glucose 143 BUN 30 creatinine 2.2 GFR 26 calcium 8.9 hemoglobin 10.9  Labs from 4/16/2021 show sodium 137 potassium 3.5 chloride 101 CO2 27 glucose 140 BUN 40 creatinine 2.4 GFR 24 calcium 8.9    Recommendations/Plan:     #1 acute kidney injury on chronic kidney disease stage IV  -Creatinine has slightly increased from 2.3--> 2.6 past year.    -Creatinine is stable at 2.6 on recent labs. 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 Advised her to discontinue all NSAIDs from here on  -Renal ultrasound shows bilateral cysts which are simple. No need for further work-up. Increased echogenicity seen consistent with medical renal disease  -Her losartan was discontinued 2/2 erik. She has minimal proteinuria. I will continue to hold  -Urine is suggestive of UTI. Only 0.2 g proteinuria per day  -Check urine and serum electrophoresis  -Follow-up on renal function in 6 weeks    #2 hypertension  -Blood pressure is well controlled in the office and at home now  -I will continue Norvasc 10 mg, atenolol 50 mg daily which I will continue.   -continue Lasix 40 mg as needed only  -Advised her to be on a low-salt diet which she is not compliant with yet  -I have asked her to call me if the blood pressure is beyond parameters at home    #3 renal osteodystrophy  -Within target intact parathyroid hormone . normal 25-hydroxy vitamin D level  -Calcium and phosphorus are okay    #4 anemia  -hemoglobin is 10.9. Likely anemia of chronic kidney disease.    -No need for MAJO agents yet. I will check iron studies with next labs    #5 right hand swelling  -Significant swelling present in the right hand for the past month  -Does not seem to be cellulitis. Patient thinks it is gout though it seems unlikely.   We will check a uric acid level  -We will order a venous Doppler stat to rule out DVT  -I am also calling in Keflex 500 twice daily for 7 days in case it is cellulitis  -I have advised her to go to the ER if swelling does not improve over the next few days with above treatment    #6 UTI  -Urine suggestive of UTI.   I will call in ciprofloxacin 250 mg daily for 3 days    Thank you for the referral.  I will see her back in the office in 3 months with preclinic labs    ________________________________________________________________________  Signed: Chinedu Sullivan MD

## 2022-07-07 NOTE — PROGRESS NOTES
Abhi Payton presents today for follow up ERI. Patient states no concerns for today. Chief Complaint   Patient presents with    Follow-up     ERI       Is someone accompanying this pt? No    Is the patient using any DME equipment during OV? No    Depression Screening:  3 most recent PHQ Screens 7/7/2022   Little interest or pleasure in doing things Not at all   Feeling down, depressed, irritable, or hopeless Not at all   Total Score PHQ 2 0       Learning Assessment:  Learning Assessment 6/8/2022   PRIMARY LEARNER Patient   HIGHEST LEVEL OF EDUCATION - PRIMARY LEARNER  -   BARRIERS PRIMARY LEARNER -   CO-LEARNER CAREGIVER -   PRIMARY LANGUAGE ENGLISH   LEARNER PREFERENCE PRIMARY DEMONSTRATION   ANSWERED BY patient   RELATIONSHIP SELF       Fall Risk  Fall Risk Assessment, last 12 mths 6/8/2022   Able to walk? Yes   Fall in past 12 months? 0   Do you feel unsteady? 0   Are you worried about falling 0       ADL  ADL Assessment 4/22/2021   Feeding yourself No Help Needed   Getting from bed to chair No Help Needed   Getting dressed No Help Needed   Bathing or showering No Help Needed   Walk across the room (includes cane/walker) No Help Needed   Using the telphone No Help Needed   Taking your medications No Help Needed   Preparing meals No Help Needed   Managing money (expenses/bills) No Help Needed   Moderately strenuous housework (laundry) No Help Needed   Shopping for personal items (toiletries/medicines) No Help Needed   Shopping for groceries No Help Needed   Driving No Help Needed   Climbing a flight of stairs No Help Needed   Getting to places beyond walking distances No Help Needed       Health Maintenance reviewed and discussed and ordered per Provider. Health Maintenance Due   Topic Date Due    Shingrix Vaccine Age 49> (1 of 2) Never done    Pneumococcal 65+ years (2 - PCV) 03/17/2015   . Coordination of Care:  1. \"Have you been to the ER, urgent care clinic since your last visit? Hospitalized since your last visit? \" No    2. \"Have you seen or consulted any other health care providers outside of the [address] [place] [address] since your last visit? \" No     3. For patients aged 39-70: Has the patient had a colonoscopy? NA - based on age     If the patient is female:    4. For patients aged 41-77: Has the patient had a mammogram within the past 2 years? NA - based on age    11. For patients aged 21-65: Has the patient had a pap smear?  NA - based on age

## 2022-09-01 RX ORDER — ATORVASTATIN CALCIUM 40 MG/1
TABLET, FILM COATED ORAL
Qty: 90 TABLET | Refills: 3 | Status: SHIPPED | OUTPATIENT
Start: 2022-09-01

## 2022-10-13 ENCOUNTER — HOSPITAL ENCOUNTER (OUTPATIENT)
Dept: LAB | Age: 77
Discharge: HOME | End: 2022-10-13
Payer: MEDICARE

## 2022-10-13 DIAGNOSIS — N18.4 CKD (CHRONIC KIDNEY DISEASE) STAGE 4, GFR 15-29 ML/MIN (HCC): ICD-10-CM

## 2022-10-13 DIAGNOSIS — I10 HYPERTENSION, ESSENTIAL: ICD-10-CM

## 2022-10-13 LAB
25(OH)D3 SERPL-MCNC: 68.7 NG/ML (ref 30–100)
ALBUMIN SERPL-MCNC: 3.5 G/DL (ref 3.4–5)
ANION GAP SERPL CALC-SCNC: 7 MMOL/L (ref 3–18)
APPEARANCE UR: CLEAR
BACTERIA URNS QL MICRO: ABNORMAL /HPF
BILIRUB UR QL: NEGATIVE
BUN SERPL-MCNC: 44 MG/DL (ref 7–18)
BUN/CREAT SERPL: 17 (ref 12–20)
CA-I BLD-MCNC: 9.4 MG/DL (ref 8.5–10.1)
CA-I BLD-MCNC: 9.5 MG/DL (ref 8.5–10.1)
CHLORIDE SERPL-SCNC: 107 MMOL/L (ref 100–111)
CO2 SERPL-SCNC: 25 MMOL/L (ref 21–32)
COLOR UR: YELLOW
CREAT SERPL-MCNC: 2.56 MG/DL (ref 0.6–1.3)
CREAT UR-MCNC: 73 MG/DL (ref 30–125)
EPITH CASTS URNS QL MICRO: ABNORMAL /LPF (ref 0–20)
GLUCOSE SERPL-MCNC: 92 MG/DL (ref 74–99)
GLUCOSE UR STRIP.AUTO-MCNC: NEGATIVE MG/DL
HGB UR QL STRIP: ABNORMAL
KETONES UR QL STRIP.AUTO: NEGATIVE MG/DL
LEUKOCYTE ESTERASE UR QL STRIP.AUTO: ABNORMAL
NITRITE UR QL STRIP.AUTO: NEGATIVE
PH UR STRIP: 5 [PH] (ref 5–8)
PHOSPHATE SERPL-MCNC: 4.1 MG/DL (ref 2.5–4.9)
POTASSIUM SERPL-SCNC: 4.6 MMOL/L (ref 3.5–5.5)
PROT UR STRIP-MCNC: NEGATIVE MG/DL
PROT UR-MCNC: 20 MG/DL
PROT/CREAT UR-RTO: 0.3
PTH-INTACT SERPL-MCNC: 71.6 PG/ML (ref 18.4–88)
RBC #/AREA URNS HPF: ABNORMAL /HPF (ref 0–2)
SODIUM SERPL-SCNC: 139 MMOL/L (ref 136–145)
SP GR UR REFRACTOMETRY: 1.01 (ref 1–1.03)
UROBILINOGEN UR QL STRIP.AUTO: 0.2 EU/DL (ref 0.2–1)
WBC URNS QL MICRO: ABNORMAL /HPF (ref 0–4)

## 2022-10-13 PROCEDURE — 83970 ASSAY OF PARATHORMONE: CPT

## 2022-10-13 PROCEDURE — 82306 VITAMIN D 25 HYDROXY: CPT

## 2022-10-13 PROCEDURE — 84156 ASSAY OF PROTEIN URINE: CPT

## 2022-10-13 PROCEDURE — 80069 RENAL FUNCTION PANEL: CPT

## 2022-10-13 PROCEDURE — 81001 URINALYSIS AUTO W/SCOPE: CPT

## 2022-10-20 ENCOUNTER — OFFICE VISIT (OUTPATIENT)
Dept: NEPHROLOGY | Age: 77
End: 2022-10-20
Payer: MEDICARE

## 2022-10-20 VITALS
OXYGEN SATURATION: 99 % | SYSTOLIC BLOOD PRESSURE: 151 MMHG | DIASTOLIC BLOOD PRESSURE: 66 MMHG | BODY MASS INDEX: 31.79 KG/M2 | HEIGHT: 64 IN | WEIGHT: 186.2 LBS | HEART RATE: 54 BPM

## 2022-10-20 DIAGNOSIS — I10 HYPERTENSION, ESSENTIAL: ICD-10-CM

## 2022-10-20 DIAGNOSIS — N18.4 CKD (CHRONIC KIDNEY DISEASE) STAGE 4, GFR 15-29 ML/MIN (HCC): Primary | ICD-10-CM

## 2022-10-20 DIAGNOSIS — N17.9 AKI (ACUTE KIDNEY INJURY) (HCC): ICD-10-CM

## 2022-10-20 PROCEDURE — G8753 SYS BP > OR = 140: HCPCS | Performed by: INTERNAL MEDICINE

## 2022-10-20 PROCEDURE — 1101F PT FALLS ASSESS-DOCD LE1/YR: CPT | Performed by: INTERNAL MEDICINE

## 2022-10-20 PROCEDURE — G8510 SCR DEP NEG, NO PLAN REQD: HCPCS | Performed by: INTERNAL MEDICINE

## 2022-10-20 PROCEDURE — G8399 PT W/DXA RESULTS DOCUMENT: HCPCS | Performed by: INTERNAL MEDICINE

## 2022-10-20 PROCEDURE — G8417 CALC BMI ABV UP PARAM F/U: HCPCS | Performed by: INTERNAL MEDICINE

## 2022-10-20 PROCEDURE — G8754 DIAS BP LESS 90: HCPCS | Performed by: INTERNAL MEDICINE

## 2022-10-20 PROCEDURE — 99214 OFFICE O/P EST MOD 30 MIN: CPT | Performed by: INTERNAL MEDICINE

## 2022-10-20 PROCEDURE — G8427 DOCREV CUR MEDS BY ELIG CLIN: HCPCS | Performed by: INTERNAL MEDICINE

## 2022-10-20 PROCEDURE — G8536 NO DOC ELDER MAL SCRN: HCPCS | Performed by: INTERNAL MEDICINE

## 2022-10-20 PROCEDURE — 1123F ACP DISCUSS/DSCN MKR DOCD: CPT | Performed by: INTERNAL MEDICINE

## 2022-10-20 PROCEDURE — 1090F PRES/ABSN URINE INCON ASSESS: CPT | Performed by: INTERNAL MEDICINE

## 2022-10-20 NOTE — PROGRESS NOTES
Renal Consultation Note    NAME:  Rosa Maria Lord   :   1945   MRN:   [id number]     ATTENDING: No admitting provider for patient encounter. PCP:  Karen Dunbar NP    Date/Time:  10/20/2022 12:58 PM      Subjective:    CC : Patient is here for follow-up of her CKD 4    HPI   patient is here for follow-up today. She denies any complaints today. Blood pressure is 141/64 mmHg. She claims compliance with her medications and low-salt diet. Review of recent records shows creatinine was 2.6. She has discontinued taking diclofenac and Aleve at home. Taking Tylenol for pain whicheems to be working okay    Past Medical History:   Diagnosis Date    Edema of foot 2016    Hyperlipidemia 2015    Hypertension, essential 2016    Menopause     Osteoarthritis of right knee 2019    Pelvic mass 2016    Urinary incontinence 2016      Past Surgical History:   Procedure Laterality Date    HX COLONOSCOPY  2011    DIVERTICULA IN THE LEFT COLON- RECOMMENDED 5 YEAR F/U    HX HYSTERECTOMY  UNKNOWN    PARTIAL- STILL HAS OVARIES     Social History     Tobacco Use    Smoking status: Never    Smokeless tobacco: Never   Substance Use Topics    Alcohol use: Never      Family History   Problem Relation Age of Onset    Breast Cancer Sister     Kidney Disease Brother         KIDNEY TRANSPLANT    OSTEOARTHRITIS Son         MENTALLY DELAYED       No Known Allergies   Prior to Admission medications    Medication Sig Start Date End Date Taking? Authorizing Provider   atorvastatin (LIPITOR) 40 mg tablet TAKE 1 TABLET DAILY 22  Yes Louis Maurer NP   amLODIPine (NORVASC) 10 mg tablet TAKE 1 TABLET ONCE DAILY. 8/3/22  Yes Hector Coleman MD   furosemide (LASIX) 40 mg tablet Take 1 Tablet by mouth daily as needed (swelling).  22  Yes Hector Coleman MD   atenoloL (TENORMIN) 25 mg tablet TAKE 1 TABLET TWICE A DAY  (NEW DIRECTIONS) 22  Yes Karen Dunbar NP   losartan (COZAAR) 25 mg tablet Take 1 Tablet by mouth daily. 5/12/22  Yes Diandra BELL NP   cholecalciferol (VITAMIN D3) (5000 Units/125 mcg) tab tablet Take 5,000 Units by mouth daily. Yes Other, MD Janene   aspirin delayed-release 81 mg tablet Take 81 mg by mouth daily. Yes Other, MD Janene   nitrofurantoin, macrocrystal-monohydrate, (MACROBID) 100 mg capsule Take 1 Capsule by mouth two (2) times a day. 7/7/22   Flora Mcrae MD       REVIEW OF SYSTEMS:       Constitutional: Negative for chills and fever. HENT: Negative. Eyes: Negative. Respiratory: Negative. Cardiovascular: Negative. Gastrointestinal: Negative for abdominal pain and nausea. Skin: Negative. Neurological: Negative. Objective:   VITALS:    Visit Vitals  BP (!) 151/66 (BP 1 Location: Right arm, BP Patient Position: Sitting, BP Cuff Size: Large adult)   Pulse (!) 54   Ht 5' 4\" (1.626 m)   Wt 84.5 kg (186 lb 3.2 oz)   SpO2 99%   BMI 31.96 kg/m²     @TMAX(24)@    PHYSICAL EXAM:     General: NAD, alert, cooperative  Eyes: sclera anicteric  Oral Cavity: No thrush or ulcers  Neck: no JVD  Chest: Fair bilateral air entry. No Wheezing or Rhonchi. No rales. Heart: normal sounds  Abdomen: soft and non tender   : no anne  Lower Extremities: No edema in both legs . Celeste Gull   Good capillary refill   skin: no rash  Neuro: intact, no focals  Psychiatric: non-depressed          LAB DATA REVIEWED:       PTH INTACT   Collected: 10/13/22 0950  Final result  Specimen: Serum     Calcium 9.4 mg/dL PTH, Intact 71.6 pg/mL          10/13/22 2202  VITAMIN D, 25 HYDROXY   Collected: 10/13/22 0947  Final result  Specimen: Serum    Vitamin D 25-Hydroxy 68.7 ng/mL             10/13/22 1433  URINALYSIS W/MICROSCOPIC   Collected: 10/13/22 0930  Final result  Specimen: Urine    Color Yellow   Blood Moderate Abnormal      Appearance Clear   Urobilinogen 0.2 EU/dL   Specific gravity 1.012   Nitrites Negative     pH (UA) 5.0   Leukocyte Esterase LARGE   Protein Negative mg/dL WBC 20-50 /hpf   Glucose Negative mg/dL RBC 0-5 /hpf   Ketone Negative mg/dL Epithelial cells Few /lpf    Bilirubin Negative   Bacteria 1+ Abnormal  /hpf          10/13/22 1204  PROTEIN/CREATININE RATIO, URINE   Collected: 10/13/22 6410  Final result  Specimen: Urine, random    Protein, urine random 20 High  mg/dL Protein/Creat. urine Ratio 0.3     Creatinine, urine random 73.00 mg/dL            10/13/22 1204  RENAL FUNCTION PANEL   Collected: 10/13/22 0947  Final result  Specimen: Serum or Plasma    Sodium 139 mmol/L Creatinine 2.56 High  mg/dL   Potassium 4.6 mmol/L BUN/Creatinine ratio 17     Chloride 107 mmol/L eGFR 19 Low  ml/min/1.73m2    CO2 25 mmol/L Calcium 9.5 mg/dL   Anion gap 7 mmol/L Phosphorus 4.1 mg/dL   Glucose 92 mg/dL Albumin 3.5 g/dL   BUN 44 High  mg/dL            Recommendations/Plan:     #1 acute kidney injury on chronic kidney disease stage IV  -Creatinine had increased from 2.3--> 2.6,but now stable at 2.5 on recent labs. 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    -Renal ultrasound shows bilateral cysts which are simple. No need for further work-up. Increased echogenicity seen consistent with medical renal disease  -Her losartan was discontinued 2/2 erik. She has minimal proteinuria. I will continue to hold  -Urine is suggestive of UTI.   Only 0.3 g proteinuria per day  -Check urine and serum electrophoresis  -Follow-up on renal function in 6 weeks    #2 hypertension  -Blood pressure is well controlled in the office and at home now  -I will continue Norvasc 10 mg, atenolol 50 mg daily which I will continue.   -continue Lasix 40 mg as needed only  -Advised her to be on a low-salt diet which she is not compliant with yet  -I have asked her to call me if the blood pressure is beyond parameters at home    #3 renal osteodystrophy  -Within target intact parathyroid hormone . normal 25-hydroxy vitamin D level  -Calcium and phosphorus are okay    #4 anemia  -hemoglobin is 10.9. Likely anemia of chronic kidney disease.    -No need for MAJO agents yet. I will check iron studies with next labs    #5 right hand swelling  -Significant swelling present in the right hand for the past month  -Does not seem to be cellulitis. Patient thinks it is gout though it seems unlikely. We will check a uric acid level  -We will order a venous Doppler stat to rule out DVT  -I am also calling in Keflex 500 twice daily for 7 days in case it is cellulitis  -I have advised her to go to the ER if swelling does not improve over the next few days with above treatment    #6 UTI  -Urine suggestive of UTI. I will call in ciprofloxacin 250 mg daily for 3 days    Thank you for the referral.  I will see her back in the office in 2 months with preclinic labs    ________________________________________________________________________  Signed: Dagoberto Noriega MD        Renal Consultation Note    NAME:  Avelino Oro   :   1945   MRN:   [id number]     ATTENDING: No admitting provider for patient encounter. PCP:  Sherice Ruano NP    Date/Time:  10/20/2022 12:58 PM      Subjective:    CC : Patient is here for follow-up of her CKD 4    HPI   patient is here for follow-up today. She denies any complaints today. Blood pressure is 144/64 mmHg. She claims compliance with her medications and low-salt diet. Review of recent records shows creatinine was 2.6. She has discontinued taking diclofenac and Aleve at home.   Taking Tylenol for pain         Past Medical History:   Diagnosis Date    Edema of foot 2016    Hyperlipidemia 2015    Hypertension, essential 2016    Menopause     Osteoarthritis of right knee 2019    Pelvic mass 2016    Urinary incontinence 6/27/2016      Past Surgical History:   Procedure Laterality Date    HX COLONOSCOPY  07/01/2011    DIVERTICULA IN THE LEFT COLON- RECOMMENDED 5 YEAR F/U    HX HYSTERECTOMY  UNKNOWN    PARTIAL- STILL HAS OVARIES     Social History     Tobacco Use    Smoking status: Never    Smokeless tobacco: Never   Substance Use Topics    Alcohol use: Never      Family History   Problem Relation Age of Onset    Breast Cancer Sister     Kidney Disease Brother         KIDNEY TRANSPLANT    OSTEOARTHRITIS Son         MENTALLY DELAYED       No Known Allergies   Prior to Admission medications    Medication Sig Start Date End Date Taking? Authorizing Provider   atorvastatin (LIPITOR) 40 mg tablet TAKE 1 TABLET DAILY 9/1/22  Yes Louis Maurer NP   amLODIPine (NORVASC) 10 mg tablet TAKE 1 TABLET ONCE DAILY. 8/3/22  Yes Eduarda Miranda MD   furosemide (LASIX) 40 mg tablet Take 1 Tablet by mouth daily as needed (swelling). 7/1/22  Yes Eduarda Miranda MD   atenoloL (TENORMIN) 25 mg tablet TAKE 1 TABLET TWICE A DAY  (NEW DIRECTIONS) 5/12/22  Yes John BELL NP   losartan (COZAAR) 25 mg tablet Take 1 Tablet by mouth daily. 5/12/22  Yes John BELL NP   cholecalciferol (VITAMIN D3) (5000 Units/125 mcg) tab tablet Take 5,000 Units by mouth daily. Yes Art, MD Janene   aspirin delayed-release 81 mg tablet Take 81 mg by mouth daily. Yes Art, MD Janene   nitrofurantoin, macrocrystal-monohydrate, (MACROBID) 100 mg capsule Take 1 Capsule by mouth two (2) times a day. 7/7/22   Eduarda Miranda MD       REVIEW OF SYSTEMS:       Constitutional: Negative for chills and fever. HENT: Negative. Eyes: Negative. Respiratory: Negative. Cardiovascular: Negative. Gastrointestinal: Negative for abdominal pain and nausea. Skin: Negative. Neurological: Negative.       Objective:   VITALS:    Visit Vitals  BP (!) 151/66 (BP 1 Location: Right arm, BP Patient Position: Sitting, BP Cuff Size: Large adult)   Pulse (!) 54   Ht 5' 4\" (1.626 m)   Wt 84.5 kg (186 lb 3.2 oz)   SpO2 99%   BMI 31.96 kg/m²     @TMAX(24)@    PHYSICAL EXAM:     General: NAD, alert, cooperative  Eyes: sclera anicteric  Oral Cavity: No thrush or ulcers  Neck: no JVD  Chest: Fair bilateral air entry. No Wheezing or Rhonchi. No rales. Heart: normal sounds  Abdomen: soft and non tender   : no anne  Lower Extremities: No edema in both legs . Elana Fanning Good capillary refill   skin: no rash  Neuro: intact, no focals  Psychiatric: non-depressed          LAB DATA REVIEWED:       PTH INTACT   Collected: 10/13/22 0950  Final result  Specimen: Serum     Calcium 9.4 mg/dL PTH, Intact 71.6 pg/mL          10/13/22 2202  VITAMIN D, 25 HYDROXY   Collected: 10/13/22 0947  Final result  Specimen: Serum    Vitamin D 25-Hydroxy 68.7 ng/mL             10/13/22 1433  URINALYSIS W/MICROSCOPIC   Collected: 10/13/22 0930  Final result  Specimen: Urine    Color Yellow   Blood Moderate Abnormal      Appearance Clear   Urobilinogen 0.2 EU/dL   Specific gravity 1.012   Nitrites Negative     pH (UA) 5.0   Leukocyte Esterase LARGE   Protein Negative mg/dL WBC 20-50 /hpf   Glucose Negative mg/dL RBC 0-5 /hpf   Ketone Negative mg/dL Epithelial cells Few /lpf    Bilirubin Negative   Bacteria 1+ Abnormal  /hpf          10/13/22 1204  PROTEIN/CREATININE RATIO, URINE   Collected: 10/13/22 4195  Final result  Specimen: Urine, random    Protein, urine random 20 High  mg/dL Protein/Creat.  urine Ratio 0.3     Creatinine, urine random 73.00 mg/dL            10/13/22 1204  RENAL FUNCTION PANEL   Collected: 10/13/22 0947  Final result  Specimen: Serum or Plasma    Sodium 139 mmol/L Creatinine 2.56 High  mg/dL   Potassium 4.6 mmol/L BUN/Creatinine ratio 17     Chloride 107 mmol/L eGFR 19 Low  ml/min/1.73m2    CO2 25 mmol/L Calcium 9.5 mg/dL   Anion gap 7 mmol/L Phosphorus 4.1 mg/dL   Glucose 92 mg/dL Albumin 3.5 g/dL   BUN 44 High  mg/dL                Recommendations/Plan:     #1 acute kidney injury on chronic kidney disease stage IV  -Creatinine has slightly increased from 2.3--> 2.6 past year.    -Creatinine is stable at 2.6 on recent labs. 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 Advised her to discontinue all NSAIDs from here on  -Renal ultrasound shows bilateral cysts which are simple. No need for further work-up. Increased echogenicity seen consistent with medical renal disease  -Her losartan was discontinued 2/2 erik. She has minimal proteinuria. I will continue to hold  -Urine is suggestive of UTI. Only 0.2 g proteinuria per day  -Check urine and serum electrophoresis  -Follow-up on renal function in 6 weeks    #2 hypertension  -Blood pressure is well controlled in the office and at home now  -I will continue Norvasc 10 mg, atenolol 25 mg daily which I will continue.   -continue Lasix 40 mg as needed only(she rarely takes it)  -Advised her to be on a low-salt diet which she is not compliant with yet  -I have asked her to call me if the blood pressure is beyond parameters at home    #3 renal osteodystrophy  -Within target intact parathyroid hormone . normal 25-hydroxy vitamin D level  -Calcium and phosphorus are okay    #4 anemia  -hemoglobin is 10.9. Likely anemia of chronic kidney disease.    -No need for MAJO agents yet. I will check iron studies with next labs    #5  UTI  -Urine suggestive of UTI. However she is asympto.  No need to rx    Thank you for the referral.  I will see her back in the office in 3 months with preclinic labs    ________________________________________________________________________  Signed: Vibha Weaver MD

## 2022-10-20 NOTE — PROGRESS NOTES
Cm Marin presents today for a follow up appointment. Patient states no concerns today. Chief Complaint   Patient presents with    Follow-up     CKD       Is someone accompanying this pt? No    Is the patient using any DME equipment during OV? No    Depression Screening:  3 most recent PHQ Screens 10/20/2022   Little interest or pleasure in doing things Not at all   Feeling down, depressed, irritable, or hopeless Not at all   Total Score PHQ 2 0       Learning Assessment:  Learning Assessment 6/8/2022   PRIMARY LEARNER Patient   HIGHEST LEVEL OF EDUCATION - PRIMARY LEARNER  -   BARRIERS PRIMARY LEARNER -   CO-LEARNER CAREGIVER -   PRIMARY LANGUAGE ENGLISH   LEARNER PREFERENCE PRIMARY DEMONSTRATION   ANSWERED BY patient   RELATIONSHIP SELF       Abuse Screening:  Abuse Screening Questionnaire 4/22/2021   Do you ever feel afraid of your partner? N   Are you in a relationship with someone who physically or mentally threatens you? N   Is it safe for you to go home? Y       Fall Risk  Fall Risk Assessment, last 12 mths 6/8/2022   Able to walk? Yes   Fall in past 12 months? 0   Do you feel unsteady? 0   Are you worried about falling 0       ADL  ADL Assessment 4/22/2021   Feeding yourself No Help Needed   Getting from bed to chair No Help Needed   Getting dressed No Help Needed   Bathing or showering No Help Needed   Walk across the room (includes cane/walker) No Help Needed   Using the telphone No Help Needed   Taking your medications No Help Needed   Preparing meals No Help Needed   Managing money (expenses/bills) No Help Needed   Moderately strenuous housework (laundry) No Help Needed   Shopping for personal items (toiletries/medicines) No Help Needed   Shopping for groceries No Help Needed   Driving No Help Needed   Climbing a flight of stairs No Help Needed   Getting to places beyond walking distances No Help Needed       Health Maintenance reviewed and discussed and ordered per Provider.     Health Maintenance Due   Topic Date Due    Shingrix Vaccine Age 50> (1 of 2) Never done    Flu Vaccine (1) 08/01/2022   . Coordination of Care:  1. Have you been to the ER, urgent care clinic since your last visit? Hospitalized since your last visit? No    2. Have you seen or consulted any other health care providers outside of the [address] [place] [address] since your last visit? Include any pap smears or colon screening.  No

## 2022-11-03 RX ORDER — AMLODIPINE BESYLATE 10 MG/1
TABLET ORAL
Qty: 90 TABLET | Refills: 0 | Status: SHIPPED | OUTPATIENT
Start: 2022-11-03

## 2022-11-15 ENCOUNTER — OFFICE VISIT (OUTPATIENT)
Dept: FAMILY MEDICINE CLINIC | Age: 77
End: 2022-11-15
Payer: MEDICARE

## 2022-11-15 VITALS
HEIGHT: 64 IN | DIASTOLIC BLOOD PRESSURE: 64 MMHG | TEMPERATURE: 98 F | OXYGEN SATURATION: 99 % | RESPIRATION RATE: 18 BRPM | SYSTOLIC BLOOD PRESSURE: 125 MMHG | WEIGHT: 183.8 LBS | HEART RATE: 56 BPM | BODY MASS INDEX: 31.38 KG/M2

## 2022-11-15 DIAGNOSIS — I10 HYPERTENSION, ESSENTIAL: Primary | ICD-10-CM

## 2022-11-15 DIAGNOSIS — E78.2 MIXED HYPERLIPIDEMIA: ICD-10-CM

## 2022-11-15 DIAGNOSIS — Z12.31 ENCOUNTER FOR SCREENING MAMMOGRAM FOR MALIGNANT NEOPLASM OF BREAST: ICD-10-CM

## 2022-11-15 DIAGNOSIS — N18.4 CKD (CHRONIC KIDNEY DISEASE) STAGE 4, GFR 15-29 ML/MIN (HCC): ICD-10-CM

## 2022-11-15 PROCEDURE — 99214 OFFICE O/P EST MOD 30 MIN: CPT | Performed by: NURSE PRACTITIONER

## 2022-11-15 PROCEDURE — 1090F PRES/ABSN URINE INCON ASSESS: CPT | Performed by: NURSE PRACTITIONER

## 2022-11-15 PROCEDURE — G8399 PT W/DXA RESULTS DOCUMENT: HCPCS | Performed by: NURSE PRACTITIONER

## 2022-11-15 PROCEDURE — G8536 NO DOC ELDER MAL SCRN: HCPCS | Performed by: NURSE PRACTITIONER

## 2022-11-15 PROCEDURE — G8417 CALC BMI ABV UP PARAM F/U: HCPCS | Performed by: NURSE PRACTITIONER

## 2022-11-15 PROCEDURE — G8754 DIAS BP LESS 90: HCPCS | Performed by: NURSE PRACTITIONER

## 2022-11-15 PROCEDURE — 1123F ACP DISCUSS/DSCN MKR DOCD: CPT | Performed by: NURSE PRACTITIONER

## 2022-11-15 PROCEDURE — G8432 DEP SCR NOT DOC, RNG: HCPCS | Performed by: NURSE PRACTITIONER

## 2022-11-15 PROCEDURE — 1101F PT FALLS ASSESS-DOCD LE1/YR: CPT | Performed by: NURSE PRACTITIONER

## 2022-11-15 PROCEDURE — G8752 SYS BP LESS 140: HCPCS | Performed by: NURSE PRACTITIONER

## 2022-11-15 PROCEDURE — G8427 DOCREV CUR MEDS BY ELIG CLIN: HCPCS | Performed by: NURSE PRACTITIONER

## 2022-11-15 PROCEDURE — 3078F DIAST BP <80 MM HG: CPT | Performed by: NURSE PRACTITIONER

## 2022-11-15 PROCEDURE — 3074F SYST BP LT 130 MM HG: CPT | Performed by: NURSE PRACTITIONER

## 2022-11-15 RX ORDER — CALCIUM CARBONATE/VITAMIN D3 500 MG-10
TABLET ORAL
COMMUNITY

## 2022-11-15 RX ORDER — GUAIFENESIN 100 MG/5ML
81 LIQUID (ML) ORAL DAILY
COMMUNITY

## 2022-11-15 NOTE — PROGRESS NOTES
Christian Fellers presents today for No chief complaint on file. Is someone accompanying this pt? no    Is the patient using any DME equipment during OV? no    Depression Screening:  3 most recent PHQ Screens 11/15/2022   Little interest or pleasure in doing things Not at all   Feeling down, depressed, irritable, or hopeless Not at all   Total Score PHQ 2 0       Learning Assessment:  Learning Assessment 6/8/2022   PRIMARY LEARNER Patient   HIGHEST LEVEL OF EDUCATION - PRIMARY LEARNER  -   BARRIERS PRIMARY LEARNER -   CO-LEARNER CAREGIVER -   PRIMARY LANGUAGE ENGLISH   LEARNER PREFERENCE PRIMARY DEMONSTRATION   ANSWERED BY patient   RELATIONSHIP SELF       Fall Risk  Fall Risk Assessment, last 12 mths 11/15/2022   Able to walk? Yes   Fall in past 12 months? 0   Do you feel unsteady? 0   Are you worried about falling 0       ADL  ADL Assessment 11/15/2022   Feeding yourself No Help Needed   Getting from bed to chair No Help Needed   Getting dressed No Help Needed   Bathing or showering No Help Needed   Walk across the room (includes cane/walker) No Help Needed   Using the telphone No Help Needed   Taking your medications No Help Needed   Preparing meals No Help Needed   Managing money (expenses/bills) No Help Needed   Moderately strenuous housework (laundry) No Help Needed   Shopping for personal items (toiletries/medicines) No Help Needed   Shopping for groceries No Help Needed   Driving No Help Needed   Climbing a flight of stairs No Help Needed   Getting to places beyond walking distances No Help Needed       Health Maintenance reviewed and discussed and ordered per Provider. Health Maintenance Due   Topic Date Due    Shingrix Vaccine Age 49> (1 of 2) Never done    COVID-19 Vaccine (5 - Booster for Moderna series) 07/12/2022   . Coordination of Care:  1. \"Have you been to the ER, urgent care clinic since your last visit? Hospitalized since your last visit? \" No    2.  \"Have you seen or consulted any other health care providers outside of the [address] [place] [address] since your last visit? \" No     3. For patients aged 39-70: Has the patient had a colonoscopy? NA - based on age     If the patient is female:    4. For patients aged 41-77: Has the patient had a mammogram within the past 2 years? NA - based on age    11. For patients aged 21-65: Has the patient had a pap smear?  NA - based on age

## 2022-11-15 NOTE — PROGRESS NOTES
History of Present Illness  Debora Singh is a 69 y.o. female who presents today for:    Chief Complaint   Patient presents with    Follow-up     68 y/o presents today for 5m follow up, no concerns per pt. Past Medical History  Past Medical History:   Diagnosis Date    Edema of foot 1/11/2016    Hyperlipidemia 6/24/2015    Hypertension, essential 9/20/2016    Menopause     Osteoarthritis of right knee 8/8/2019    Pelvic mass 7/21/2016    Urinary incontinence 6/27/2016        Surgical History  Past Surgical History:   Procedure Laterality Date    HX COLONOSCOPY  07/01/2011    DIVERTICULA IN THE LEFT COLON- RECOMMENDED 5 YEAR F/U    HX HYSTERECTOMY  UNKNOWN    PARTIAL- STILL HAS OVARIES        Current Medications  Current Outpatient Medications   Medication Sig    Calcium-Cholecalciferol, D3, 500 mg-10 mcg (400 unit) tab Take  by mouth. aspirin 81 mg chewable tablet Take 81 mg by mouth daily. atenoloL (TENORMIN) 25 mg tablet TAKE 1 TABLET TWICE A DAY (NEW DIRECTIONS)    amLODIPine (NORVASC) 10 mg tablet TAKE 1 TABLET ONCE DAILY. atorvastatin (LIPITOR) 40 mg tablet TAKE 1 TABLET DAILY    furosemide (LASIX) 40 mg tablet Take 1 Tablet by mouth daily as needed (swelling). cholecalciferol (VITAMIN D3) (5000 Units/125 mcg) tab tablet Take 5,000 Units by mouth daily. aspirin delayed-release 81 mg tablet Take 81 mg by mouth daily. losartan (COZAAR) 25 mg tablet Take 1 Tablet by mouth daily. (Patient not taking: Reported on 11/15/2022)     No current facility-administered medications for this visit.        Allergies/Drug Reactions  No Known Allergies     Family History  Family History   Problem Relation Age of Onset    Breast Cancer Sister     Kidney Disease Brother         KIDNEY TRANSPLANT    OSTEOARTHRITIS Son         MENTALLY DELAYED        Social History  Social History     Tobacco Use    Smoking status: Never    Smokeless tobacco: Never   Vaping Use    Vaping Use: Never used   Substance Use Topics Alcohol use: Never    Drug use: Never        Health Maintenance   Topic Date Due    Shingrix Vaccine Age 49> (1 of 2) Never done    COVID-19 Vaccine (5 - Booster for Moderna series) 07/12/2022    Lipid Screen  04/11/2023    Medicare Yearly Exam  06/24/2023    Depression Screen  10/20/2023    DTaP/Tdap/Td series (2 - Td or Tdap) 03/17/2024    Hepatitis C Screening  Completed    Bone Densitometry (Dexa) Screening  Completed    Flu Vaccine  Completed    Pneumococcal 65+ years  Completed     Immunization History   Administered Date(s) Administered    COVID-19, MODERNA BLUE border, Primary or Immunocompromised, (age 18y+), IM, 100 mcg/0.5mL 01/21/2021, 02/15/2021, 05/17/2022    COVID-19, MODERNA Booster BLUE border, (age 18y+), IM, 50mcg/0.25mL 11/01/2021    Influenza High Dose Vaccine PF 10/28/2022    Influenza Vaccine 11/01/2016, 11/14/2017, 10/30/2018, 11/04/2019, 10/12/2020    Influenza, FLUAD, (age 72 y+), Adjuvanted 10/12/2020, 10/12/2021    Influenza, FLUARIX, FLULAVAL, FLUZONE (age 10 mo+) AND AFLURIA, (age 1 y+), PF, 0.5mL 11/16/2020, 11/16/2020    Pneumococcal Polysaccharide (PPSV-23) 03/17/2014    Tdap 03/17/2014     Physical Exam  Vital signs:   Vitals:    11/15/22 0949   BP: 125/64   Pulse: (!) 56   Resp: 18   Temp: 98 °F (36.7 °C)   SpO2: 99%   Weight: 183 lb 12.8 oz (83.4 kg)   Height: 5' 4\" (1.626 m)     Laboratory/Tests:  Hospital Outpatient Visit on 10/13/2022   Component Date Value Ref Range Status    Protein, urine random 10/13/2022 20 (A)  <11.9 mg/dL Final    Creatinine, urine random 10/13/2022 73.00  30.00 - 125.00 mg/dL Final    Protein/Creat.  urine Ratio 10/13/2022 0.3    Final    Vitamin D 25-Hydroxy 10/13/2022 68.7  30 - 100 ng/mL Final    Comment: (NOTE)  Deficiency               <20 ng/mL  Insufficiency          20-30 ng/mL  Sufficient             ng/mL  Possible toxicity       >100 ng/mL    The Method used is Siemens Advia Cloud9 IDEaur currently standardized to a   Center of Disease Control and Prevention (CDC) certified reference   method. Samples containing fluorescein dye can produce falsely   elevated values when tested with the ADVIA Centaur Vitamin D Assay. It is recommended that results in the toxic range, >100 ng/mL, be   retested 72 hours post fluorescein exposure. Sodium 10/13/2022 139  136 - 145 mmol/L Final    Potassium 10/13/2022 4.6  3.5 - 5.5 mmol/L Final    Chloride 10/13/2022 107  100 - 111 mmol/L Final    CO2 10/13/2022 25  21 - 32 mmol/L Final    Anion gap 10/13/2022 7  3.0 - 18.0 mmol/L Final    Glucose 10/13/2022 92  74 - 99 mg/dL Final    BUN 10/13/2022 44 (A)  7 - 18 mg/dL Final    Creatinine 10/13/2022 2.56 (A)  0.60 - 1.30 mg/dL Final    BUN/Creatinine ratio 10/13/2022 17  12 - 20   Final    eGFR 10/13/2022 19 (A)  >60 ml/min/1.73m2 Final    Comment:    Pediatric calculator link: [employer]. org/professionals/kdoqi/gfr_calculatorped    Effective Oct 3, 2022    These results are not intended for use in patients <25years of age. eGFR results are calculated without a race factor using  the 2021 CKD-EPI equation. Careful clinical correlation is recommended, particularly when comparing to results calculated using previous equations. The CKD-EPI equation is less accurate in patients with extremes of muscle mass, extra-renal metabolism of creatinine, excessive creatine ingestion, or following therapy that affects renal tubular secretion.       Calcium 10/13/2022 9.5  8.5 - 10.1 mg/dL Final    Phosphorus 10/13/2022 4.1  2.5 - 4.9 mg/dL Final    Albumin 10/13/2022 3.5  3.4 - 5.0 g/dL Final    Calcium 10/13/2022 9.4  8.5 - 10.1 mg/dL Final    PTH, Intact 10/13/2022 71.6  18.4 - 88.0 pg/mL Final    Color 10/13/2022 Yellow    Final    Appearance 10/13/2022 Clear    Final    Specific gravity 10/13/2022 1.012  1.005 - 1.030   Final    pH (UA) 10/13/2022 5.0  5.0 - 8.0   Final    Protein 10/13/2022 Negative  Negative mg/dL Final    Glucose 10/13/2022 Negative Negative mg/dL Final    Ketone 10/13/2022 Negative  Negative mg/dL Final    Bilirubin 10/13/2022 Negative  Negative   Final    Blood 10/13/2022 Moderate (A)  Negative   Final    Urobilinogen 10/13/2022 0.2  0.2 - 1.0 EU/dL Final    Nitrites 10/13/2022 Negative  Negative   Final    Leukocyte Esterase 10/13/2022 LARGE  Negative Final    WBC 10/13/2022 20-50  0 - 4 /hpf Final    RBC 10/13/2022 0-5  0 - 2 /hpf Final    Epithelial cells 10/13/2022 Few  0 - 20 /lpf Final    Epithelial cell category consists of squamous cells and/or transitional urothelial cells. Renal tubular cells, if present, are separately identified as such. Bacteria 10/13/2022 1+ (A)  None /hpf Final     Patient was evaluated by Dr. Mayda Montalvo, nephrology on 10/20/2022 due to CKD stage IV. Patient was advised to continue amlodipine 10 mg tablet daily, Atenolol 25 mg daily and Furosemide 40 mg tablet as needed. Dr. Mayda Montalvo felt right hand swelling could be indicative of possible cellulitis and prescribed Keflex 500 mg twice daily for 7 days for cellulitis prophylaxis. Urine was also indicative of UTI and she was prescribed Ciprofloxacin 250 mg tablet daily for 3 days. Verified patient prescriptions at this visit. Assessment/Plan:    Essential hypertension. Continue Amlodipine 10 mg tablet daily, Atenolol 25 mg tablet twice daily for management of essential hypertension. CKD stage IV. Patient will follow up with nephrology as directed for management of CKD stage IV. Encounter for screening mammogram.  Ordered bilateral breast cancer screening via mammogram.  Mixed hyperlipidemia. Continue Atorvastatin 40 mg tablet daily for management of mixed hyperlipidemia. I have discussed the diagnosis with the patient and the intended plan as seen in the above orders. The patient has received an after-visit summary and questions were answered concerning future plans. I have discussed medication side effects and warnings with the patient as well.  I have reviewed the plan of care with the patient, accepted their input and they are in agreement with the treatment goals.        Akhil Rojas NP  November 15, 2022

## 2022-12-15 ENCOUNTER — HOSPITAL ENCOUNTER (OUTPATIENT)
Dept: MAMMOGRAPHY | Age: 77
Discharge: HOME | End: 2022-12-15
Attending: NURSE PRACTITIONER
Payer: MEDICARE

## 2022-12-15 DIAGNOSIS — Z12.31 ENCOUNTER FOR SCREENING MAMMOGRAM FOR MALIGNANT NEOPLASM OF BREAST: ICD-10-CM

## 2022-12-15 PROCEDURE — 77063 BREAST TOMOSYNTHESIS BI: CPT

## 2022-12-19 RX ORDER — FUROSEMIDE 40 MG/1
TABLET ORAL
Qty: 30 TABLET | Refills: 0 | Status: SHIPPED | OUTPATIENT
Start: 2022-12-19

## 2023-01-20 DIAGNOSIS — N17.9 AKI (ACUTE KIDNEY INJURY) (HCC): ICD-10-CM

## 2023-01-20 DIAGNOSIS — I10 HYPERTENSION, ESSENTIAL: ICD-10-CM

## 2023-01-20 DIAGNOSIS — N18.4 CKD (CHRONIC KIDNEY DISEASE) STAGE 4, GFR 15-29 ML/MIN (HCC): ICD-10-CM

## 2023-01-26 ENCOUNTER — HOSPITAL ENCOUNTER (OUTPATIENT)
Dept: LAB | Age: 78
Discharge: HOME | End: 2023-01-26
Payer: MEDICARE

## 2023-01-26 DIAGNOSIS — I10 HYPERTENSION, ESSENTIAL: ICD-10-CM

## 2023-01-26 DIAGNOSIS — N18.4 CKD (CHRONIC KIDNEY DISEASE) STAGE 4, GFR 15-29 ML/MIN (HCC): ICD-10-CM

## 2023-01-26 DIAGNOSIS — N17.9 AKI (ACUTE KIDNEY INJURY) (HCC): ICD-10-CM

## 2023-01-26 LAB
ALBUMIN SERPL-MCNC: 3.3 G/DL (ref 3.4–5)
ANION GAP SERPL CALC-SCNC: 10 MMOL/L (ref 3–18)
APPEARANCE UR: ABNORMAL
BACTERIA URNS QL MICRO: ABNORMAL /HPF
BASOPHILS # BLD: 0 K/UL (ref 0–0.1)
BASOPHILS NFR BLD: 1 % (ref 0–2)
BILIRUB UR QL: NEGATIVE
BUN SERPL-MCNC: 45 MG/DL (ref 7–18)
BUN/CREAT SERPL: 17 (ref 12–20)
CA-I BLD-MCNC: 9.2 MG/DL (ref 8.5–10.1)
CHLORIDE SERPL-SCNC: 108 MMOL/L (ref 100–111)
CO2 SERPL-SCNC: 25 MMOL/L (ref 21–32)
COLOR UR: YELLOW
CREAT SERPL-MCNC: 2.72 MG/DL (ref 0.6–1.3)
DIFFERENTIAL METHOD BLD: ABNORMAL
EOSINOPHIL # BLD: 0.2 K/UL (ref 0–0.4)
EOSINOPHIL NFR BLD: 4 % (ref 0–5)
EPITH CASTS URNS QL MICRO: ABNORMAL /LPF (ref 0–20)
ERYTHROCYTE [DISTWIDTH] IN BLOOD BY AUTOMATED COUNT: 15.3 % (ref 11.6–14.5)
GLUCOSE SERPL-MCNC: 101 MG/DL (ref 74–99)
GLUCOSE UR STRIP.AUTO-MCNC: NEGATIVE MG/DL
HCT VFR BLD AUTO: 37.7 % (ref 35–45)
HGB BLD-MCNC: 11.9 G/DL (ref 12–16)
HGB UR QL STRIP: ABNORMAL
IMM GRANULOCYTES # BLD AUTO: 0 K/UL (ref 0–0.04)
IMM GRANULOCYTES NFR BLD AUTO: 0 % (ref 0–0.5)
KETONES UR QL STRIP.AUTO: NEGATIVE MG/DL
LEUKOCYTE ESTERASE UR QL STRIP.AUTO: ABNORMAL
LYMPHOCYTES # BLD: 1.8 K/UL (ref 0.9–3.6)
LYMPHOCYTES NFR BLD: 33 % (ref 21–52)
MCH RBC QN AUTO: 26.4 PG (ref 24–34)
MCHC RBC AUTO-ENTMCNC: 31.6 G/DL (ref 31–37)
MCV RBC AUTO: 83.8 FL (ref 78–100)
MONOCYTES # BLD: 0.5 K/UL (ref 0.05–1.2)
MONOCYTES NFR BLD: 10 % (ref 3–10)
NEUTS SEG # BLD: 2.8 K/UL (ref 1.8–8)
NEUTS SEG NFR BLD: 52 % (ref 40–73)
NITRITE UR QL STRIP.AUTO: NEGATIVE
NRBC # BLD: 0 K/UL (ref 0–0.01)
NRBC BLD-RTO: 0 PER 100 WBC
PH UR STRIP: 5 (ref 5–8)
PHOSPHATE SERPL-MCNC: 3.8 MG/DL (ref 2.5–4.9)
PLATELET # BLD AUTO: 296 K/UL (ref 135–420)
PMV BLD AUTO: 10.9 FL (ref 9.2–11.8)
POTASSIUM SERPL-SCNC: 4.2 MMOL/L (ref 3.5–5.5)
PROT UR STRIP-MCNC: 30 MG/DL
RBC # BLD AUTO: 4.5 M/UL (ref 4.2–5.3)
RBC #/AREA URNS HPF: ABNORMAL /HPF (ref 0–2)
SODIUM SERPL-SCNC: 143 MMOL/L (ref 136–145)
SP GR UR REFRACTOMETRY: 1.01 (ref 1–1.03)
UROBILINOGEN UR QL STRIP.AUTO: 0.2 EU/DL (ref 0.2–1)
WBC # BLD AUTO: 5.3 K/UL (ref 4.6–13.2)
WBC URNS QL MICRO: ABNORMAL /HPF (ref 0–4)

## 2023-01-26 PROCEDURE — 82043 UR ALBUMIN QUANTITATIVE: CPT

## 2023-01-26 PROCEDURE — 81001 URINALYSIS AUTO W/SCOPE: CPT

## 2023-01-26 PROCEDURE — 80069 RENAL FUNCTION PANEL: CPT

## 2023-01-26 PROCEDURE — 85025 COMPLETE CBC W/AUTO DIFF WBC: CPT

## 2023-01-26 PROCEDURE — 36415 COLL VENOUS BLD VENIPUNCTURE: CPT

## 2023-01-26 PROCEDURE — 82570 ASSAY OF URINE CREATININE: CPT

## 2023-01-27 LAB
CREAT UR-MCNC: 127 MG/DL (ref 30–125)
PROT UR-MCNC: 52 MG/DL
PROT/CREAT UR-RTO: 0.4

## 2023-02-02 ENCOUNTER — OFFICE VISIT (OUTPATIENT)
Dept: NEPHROLOGY | Age: 78
End: 2023-02-02
Payer: MEDICARE

## 2023-02-02 VITALS
WEIGHT: 185.6 LBS | SYSTOLIC BLOOD PRESSURE: 148 MMHG | HEART RATE: 62 BPM | DIASTOLIC BLOOD PRESSURE: 75 MMHG | HEIGHT: 64 IN | BODY MASS INDEX: 31.69 KG/M2 | OXYGEN SATURATION: 99 %

## 2023-02-02 DIAGNOSIS — N17.9 AKI (ACUTE KIDNEY INJURY) (HCC): ICD-10-CM

## 2023-02-02 DIAGNOSIS — I10 HYPERTENSION, ESSENTIAL: ICD-10-CM

## 2023-02-02 DIAGNOSIS — N18.4 CKD (CHRONIC KIDNEY DISEASE) STAGE 4, GFR 15-29 ML/MIN (HCC): Primary | ICD-10-CM

## 2023-02-02 RX ORDER — AMLODIPINE BESYLATE 10 MG/1
10 TABLET ORAL DAILY
Qty: 90 TABLET | Refills: 0 | Status: SHIPPED | OUTPATIENT
Start: 2023-02-02 | End: 2023-05-03

## 2023-02-02 RX ORDER — CIPROFLOXACIN 250 MG/1
250 TABLET, FILM COATED ORAL DAILY
Qty: 7 TABLET | Refills: 0 | Status: SHIPPED | OUTPATIENT
Start: 2023-02-02

## 2023-02-02 NOTE — PROGRESS NOTES
Renal Consultation Note    NAME:  Melania Bunn   :   1945   MRN:   [id number]     ATTENDING: No admitting provider for patient encounter. PCP:  Amy Ornelas NP    Date/Time:  2023 12:58 PM      Subjective:    CC : Patient is here for follow-up of her CKD 4    HPI   patient is here for follow-up today. She denies any complaints today. Blood pressure is 148/75 mmHg. She claims compliance with her medications and low-salt diet. Review of recent records shows creatinine was 2.6. She has discontinued taking diclofenac and Aleve at home. Taking Tylenol for pain whicheems to be working okay    Past Medical History:   Diagnosis Date    Edema of foot 2016    Hyperlipidemia 2015    Hypertension, essential 2016    Menopause     Osteoarthritis of right knee 2019    Pelvic mass 2016    Urinary incontinence 2016      Past Surgical History:   Procedure Laterality Date    HX COLONOSCOPY  2011    DIVERTICULA IN THE LEFT COLON- RECOMMENDED 5 YEAR F/U    HX HYSTERECTOMY  UNKNOWN    PARTIAL- STILL HAS OVARIES     Social History     Tobacco Use    Smoking status: Never    Smokeless tobacco: Never   Substance Use Topics    Alcohol use: Never      Family History   Problem Relation Age of Onset    Breast Cancer Sister     Kidney Disease Brother         KIDNEY TRANSPLANT    OSTEOARTHRITIS Son         MENTALLY DELAYED       No Known Allergies   Prior to Admission medications    Medication Sig Start Date End Date Taking? Authorizing Provider   furosemide (LASIX) 40 mg tablet Take 1 Tablet by mouth daily as needed (swelling). 22  Yes Margoth Cesar MD   Calcium-Cholecalciferol, D3, 500 mg-10 mcg (400 unit) tab Take  by mouth. Yes Provider, Historical   aspirin 81 mg chewable tablet Take 81 mg by mouth daily.    Yes Provider, Historical   atenoloL (TENORMIN) 25 mg tablet TAKE 1 TABLET TWICE A DAY (NEW DIRECTIONS) 11/3/22  Yes Amy Ornelas NP   amLODIPine (NORVASC) 10 mg tablet TAKE 1 TABLET ONCE DAILY. 11/3/22  Yes Jose A Pandya MD   atorvastatin (LIPITOR) 40 mg tablet TAKE 1 TABLET DAILY 9/1/22  Yes Katelyn BELL NP   cholecalciferol (VITAMIN D3) (5000 Units/125 mcg) tab tablet Take 5,000 Units by mouth daily. Yes Other, MD Janene   losartan (COZAAR) 25 mg tablet Take 1 Tablet by mouth daily. Patient not taking: No sig reported 5/12/22   Katelyn BELL NP       REVIEW OF SYSTEMS:       Constitutional: Negative for chills and fever. HENT: Negative. Eyes: Negative. Respiratory: Negative. Cardiovascular: Negative. Gastrointestinal: Negative for abdominal pain and nausea. Skin: Negative. Neurological: Negative. Objective:   VITALS:    Visit Vitals  BP (!) 148/75 (BP 1 Location: Right arm, BP Patient Position: Sitting, BP Cuff Size: Adult)   Pulse 62   Ht 5' 4\" (1.626 m)   Wt 84.2 kg (185 lb 9.6 oz)   SpO2 99%   BMI 31.86 kg/m²       PHYSICAL EXAM:     General: NAD, alert, cooperative  Eyes: sclera anicteric  Oral Cavity: No thrush or ulcers  Neck: no JVD  Chest: Fair bilateral air entry. No Wheezing or Rhonchi. No rales. Heart: normal sounds  Abdomen: soft and non tender   : no anne  Lower Extremities: No edema in both legs . Pinky Angles Good capillary refill   skin: no rash  Neuro: intact, no focals  Psychiatric: non-depressed          LAB DATA REVIEWED:      1049  PROTEIN/CREATININE RATIO, URINE   Collected: 01/26/23 1024  Final result  Specimen: Urine, random    Protein, urine random 52 High  mg/dL Protein/Creat.  urine Ratio 0.4     Creatinine, urine random 127.00 High  mg/dL            01/26/23 1503  URINALYSIS W/MICROSCOPIC   Collected: 01/26/23 1024  Final result  Specimen: Urine    Color Yellow    Blood Large Abnormal      Appearance Cloudy   Urobilinogen 0.2 EU/dL   Specific gravity 1.014   Nitrites Negative     pH (UA) 5.0   Leukocyte Esterase Large Abnormal      Protein 30 Abnormal  mg/dL WBC  /hpf   Glucose Negative mg/dL RBC 20-50 /hpf   Ketone Negative mg/dL Epithelial cells Moderate /lpf    Bilirubin Negative   Bacteria 2+ Abnormal  /hpf          01/26/23 1132  RENAL FUNCTION PANEL   Collected: 01/26/23 1024  Final result  Specimen: Serum or Plasma    Sodium 143 mmol/L Creatinine 2.72 High  mg/dL   Potassium 4.2 mmol/L BUN/Creatinine ratio 17     Chloride 108 mmol/L eGFR 18 Low  ml/min/1.73m2    CO2 25 mmol/L Calcium 9.2 mg/dL   Anion gap 10 mmol/L Phosphorus 3.8 mg/dL   Glucose 101 High  mg/dL Albumin 3.3 Low  g/dL   BUN 45 High  mg/dL            01/26/23 1122  CBC WITH AUTOMATED DIFF   Collected: 01/26/23 1024  Final result  Specimen: Whole Blood    WBC 5.3 K/uL LYMPHOCYTES 33 %   RBC 4.50 M/uL MONOCYTES 10 %   HGB 11.9 Low  g/dL EOSINOPHILS 4 %   HCT 37.7 % BASOPHILS 1 %   MCV 83.8 FL IMMATURE GRANULOCYTES 0 %   MCH 26.4 PG ABS. NEUTROPHILS 2.8 K/UL   MCHC 31.6 g/dL ABS. LYMPHOCYTES 1.8 K/UL   RDW 15.3 High  % ABS. MONOCYTES 0.5 K/UL   PLATELET 364 K/uL ABS. EOSINOPHILS 0.2 K/UL   MPV 10.9 FL ABS. BASOPHILS 0.0 K/UL   NRBC 0.0  WBC ABS. IMM. GRANS. 0.0 K/UL   ABSOLUTE NRBC 0.00 K/uL DF AUTOMATED     NEUTROPHILS 52 %            Recommendations/Plan:     #1 acute kidney injury on chronic kidney disease stage IV  -Creatinine had increased from 2.3--> 2.6,but now stable at 2.7 on recent labs. 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    -Renal ultrasound shows bilateral cysts which are simple. No need for further work-up. Increased echogenicity seen consistent with medical renal disease  -Her losartan was discontinued 2/2 erik. She has minimal proteinuria. I will continue to hold  -Urine is suggestive of UTI.   Only 0.4 g proteinuria per day  -Check urine and serum electrophoresis  -Follow-up on renal function in 6 weeks    #2 hypertension  -Blood pressure is well controlled in the office and at home now  -I will continue Norvasc 10 mg, atenolol 50 mg daily which I will continue.   -continue Lasix 40 mg as needed only  -Advised her to be on a low-salt diet which she is not compliant with yet  -I have asked her to call me if the blood pressure is beyond parameters at home    #3 renal osteodystrophy  -Within target intact parathyroid hormone . normal 25-hydroxy vitamin D level  -Calcium and phosphorus are okay    #4 anemia  -hemoglobin is 11.9. Likely anemia of chronic kidney disease.    -No need for MAJO agents yet. I will check iron studies with next labs    #5  UTI  -Urine suggestive of UTI. I will call in ciprofloxacin 250 mg daily for 7 days    Thank you for the referral.  I will see her back in the office in 2 months with preclinic labs    ________________________________________________________________________  Signed: Dagoberto Noriega MD        Renal Consultation Note    NAME:  Avelino Oro   :   1945   MRN:   [id number]     ATTENDING: No admitting provider for patient encounter. PCP:  Sherice Ruano NP    Date/Time:  2023 12:58 PM      Subjective:    CC : Patient is here for follow-up of her CKD 4    HPI   patient is here for follow-up today. She denies any complaints today. Blood pressure is 144/64 mmHg. She claims compliance with her medications and low-salt diet. Review of recent records shows creatinine was 2.6. She has discontinued taking diclofenac and Aleve at home.   Taking Tylenol for pain         Past Medical History:   Diagnosis Date    Edema of foot 2016    Hyperlipidemia 2015    Hypertension, essential 2016    Menopause     Osteoarthritis of right knee 2019    Pelvic mass 2016    Urinary incontinence 2016      Past Surgical History:   Procedure Laterality Date    HX COLONOSCOPY  2011 DIVERTICULA IN THE LEFT COLON- RECOMMENDED 5 YEAR F/U    HX HYSTERECTOMY  UNKNOWN    PARTIAL- STILL HAS OVARIES     Social History     Tobacco Use    Smoking status: Never    Smokeless tobacco: Never   Substance Use Topics    Alcohol use: Never      Family History   Problem Relation Age of Onset    Breast Cancer Sister     Kidney Disease Brother         KIDNEY TRANSPLANT    OSTEOARTHRITIS Son         MENTALLY DELAYED       No Known Allergies   Prior to Admission medications    Medication Sig Start Date End Date Taking? Authorizing Provider   furosemide (LASIX) 40 mg tablet Take 1 Tablet by mouth daily as needed (swelling). 12/19/22  Yes Bev Olsen MD   Calcium-Cholecalciferol, D3, 500 mg-10 mcg (400 unit) tab Take  by mouth. Yes Provider, Historical   aspirin 81 mg chewable tablet Take 81 mg by mouth daily. Yes Provider, Historical   atenoloL (TENORMIN) 25 mg tablet TAKE 1 TABLET TWICE A DAY (NEW DIRECTIONS) 11/3/22  Yes Louis Maurer NP   amLODIPine (NORVASC) 10 mg tablet TAKE 1 TABLET ONCE DAILY. 11/3/22  Yes Bev Olsen MD   atorvastatin (LIPITOR) 40 mg tablet TAKE 1 TABLET DAILY 9/1/22  Yes Jodi BELL NP   cholecalciferol (VITAMIN D3) (5000 Units/125 mcg) tab tablet Take 5,000 Units by mouth daily. Yes Other, MD Janene   losartan (COZAAR) 25 mg tablet Take 1 Tablet by mouth daily. Patient not taking: No sig reported 5/12/22   Jodi BELL NP       REVIEW OF SYSTEMS:       Constitutional: Negative for chills and fever. HENT: Negative. Eyes: Negative. Respiratory: Negative. Cardiovascular: Negative. Gastrointestinal: Negative for abdominal pain and nausea. Skin: Negative. Neurological: Negative.       Objective:   VITALS:    Visit Vitals  BP (!) 148/75 (BP 1 Location: Right arm, BP Patient Position: Sitting, BP Cuff Size: Adult)   Pulse 62   Ht 5' 4\" (1.626 m)   Wt 84.2 kg (185 lb 9.6 oz)   SpO2 99%   BMI 31.86 kg/m²     @TMAX(24)@    PHYSICAL EXAM: General: NAD, alert, cooperative  Eyes: sclera anicteric  Oral Cavity: No thrush or ulcers  Neck: no JVD  Chest: Fair bilateral air entry. No Wheezing or Rhonchi. No rales. Heart: normal sounds  Abdomen: soft and non tender   : no anne  Lower Extremities: No edema in both legs . Naida Bloodgood Good capillary refill   skin: no rash  Neuro: intact, no focals  Psychiatric: non-depressed          LAB DATA REVIEWED:       PTH INTACT   Collected: 10/13/22 0950  Final result  Specimen: Serum     Calcium 9.4 mg/dL PTH, Intact 71.6 pg/mL          10/13/22 2202  VITAMIN D, 25 HYDROXY   Collected: 10/13/22 0947  Final result  Specimen: Serum    Vitamin D 25-Hydroxy 68.7 ng/mL             10/13/22 1433  URINALYSIS W/MICROSCOPIC   Collected: 10/13/22 0930  Final result  Specimen: Urine    Color Yellow   Blood Moderate Abnormal      Appearance Clear   Urobilinogen 0.2 EU/dL   Specific gravity 1.012   Nitrites Negative     pH (UA) 5.0   Leukocyte Esterase LARGE   Protein Negative mg/dL WBC 20-50 /hpf   Glucose Negative mg/dL RBC 0-5 /hpf   Ketone Negative mg/dL Epithelial cells Few /lpf    Bilirubin Negative   Bacteria 1+ Abnormal  /hpf          10/13/22 1204  PROTEIN/CREATININE RATIO, URINE   Collected: 10/13/22 3494  Final result  Specimen: Urine, random    Protein, urine random 20 High  mg/dL Protein/Creat. urine Ratio 0.3     Creatinine, urine random 73.00 mg/dL            10/13/22 1204  RENAL FUNCTION PANEL   Collected: 10/13/22 0947  Final result  Specimen: Serum or Plasma    Sodium 139 mmol/L Creatinine 2.56 High  mg/dL   Potassium 4.6 mmol/L BUN/Creatinine ratio 17     Chloride 107 mmol/L eGFR 19 Low  ml/min/1.73m2    CO2 25 mmol/L Calcium 9.5 mg/dL   Anion gap 7 mmol/L Phosphorus 4.1 mg/dL   Glucose 92 mg/dL Albumin 3.5 g/dL   BUN 44 High  mg/dL                Recommendations/Plan:     #1 acute kidney injury on chronic kidney disease stage IV  -Creatinine has slightly increased from 2.3--> 2.6 past year. -Creatinine is stable at 2.6 on recent labs. 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 Advised her to discontinue all NSAIDs from here on  -Renal ultrasound shows bilateral cysts which are simple. No need for further work-up. Increased echogenicity seen consistent with medical renal disease  -Her losartan was discontinued 2/2 erik. She has minimal proteinuria. I will continue to hold  -Urine is suggestive of UTI. Only 0.2 g proteinuria per day  -Check urine and serum electrophoresis  -Follow-up on renal function in 6 weeks    #2 hypertension  -Blood pressure is well controlled in the office and at home now  -I will continue Norvasc 10 mg, atenolol 25 mg daily which I will continue.   -continue Lasix 40 mg as needed only(she rarely takes it)  -Advised her to be on a low-salt diet which she is not compliant with yet  -I have asked her to call me if the blood pressure is beyond parameters at home    #3 renal osteodystrophy  -Within target intact parathyroid hormone . normal 25-hydroxy vitamin D level  -Calcium and phosphorus are okay    #4 anemia  -hemoglobin is 10.9. Likely anemia of chronic kidney disease.    -No need for MAJO agents yet. I will check iron studies with next labs    #5  UTI  -Urine suggestive of UTI. However she is asympto.  No need to rx    Thank you for the referral.  I will see her back in the office in 3 months with preclinic labs    ________________________________________________________________________  Signed: Allyssa Saucedo MD

## 2023-02-02 NOTE — PROGRESS NOTES
Rosa Maria Lord presents today for a follow up. Chief Complaint   Patient presents with    Follow-up     CKD       Is someone accompanying this pt? No    Is the patient using any DME equipment during OV? No    Depression Screening:  3 most recent PHQ Screens 2/2/2023   Little interest or pleasure in doing things Not at all   Feeling down, depressed, irritable, or hopeless Not at all   Total Score PHQ 2 0       Learning Assessment:  Learning Assessment 6/8/2022   PRIMARY LEARNER Patient   HIGHEST LEVEL OF EDUCATION - PRIMARY LEARNER  -   BARRIERS PRIMARY LEARNER -   CO-LEARNER CAREGIVER -   PRIMARY LANGUAGE ENGLISH   LEARNER PREFERENCE PRIMARY DEMONSTRATION   ANSWERED BY patient   RELATIONSHIP SELF       Abuse Screening:  Abuse Screening Questionnaire 11/15/2022   Do you ever feel afraid of your partner? N   Are you in a relationship with someone who physically or mentally threatens you? N   Is it safe for you to go home? Y       Fall Risk  Fall Risk Assessment, last 12 mths 11/15/2022   Able to walk? Yes   Fall in past 12 months? 0   Do you feel unsteady? 0   Are you worried about falling 0       ADL  ADL Assessment 11/15/2022   Feeding yourself No Help Needed   Getting from bed to chair No Help Needed   Getting dressed No Help Needed   Bathing or showering No Help Needed   Walk across the room (includes cane/walker) No Help Needed   Using the telphone No Help Needed   Taking your medications No Help Needed   Preparing meals No Help Needed   Managing money (expenses/bills) No Help Needed   Moderately strenuous housework (laundry) No Help Needed   Shopping for personal items (toiletries/medicines) No Help Needed   Shopping for groceries No Help Needed   Driving No Help Needed   Climbing a flight of stairs No Help Needed   Getting to places beyond walking distances No Help Needed       Health Maintenance reviewed and discussed and ordered per Provider.     Health Maintenance Due   Topic Date Due    Shingles Vaccine (1 of 2) Never done    COVID-19 Vaccine (5 - Booster for Moderna series) 07/12/2022   . Coordination of Car  1. Have you been to the ER, urgent care clinic since your last visit? Hospitalized since your last visit? No      2. Have you seen or consulted any other health care providers outside of the [facility] since your last visit? Include any pap smears or colon screening.  No

## 2023-02-05 DIAGNOSIS — N17.9 AKI (ACUTE KIDNEY INJURY) (HCC): ICD-10-CM

## 2023-02-05 DIAGNOSIS — N18.4 CKD (CHRONIC KIDNEY DISEASE) STAGE 4, GFR 15-29 ML/MIN (HCC): Primary | ICD-10-CM

## 2023-02-05 DIAGNOSIS — I10 HYPERTENSION, ESSENTIAL: ICD-10-CM

## 2023-02-07 DIAGNOSIS — N18.4 CKD (CHRONIC KIDNEY DISEASE) STAGE 4, GFR 15-29 ML/MIN (HCC): Primary | ICD-10-CM

## 2023-02-07 DIAGNOSIS — N17.9 AKI (ACUTE KIDNEY INJURY) (HCC): ICD-10-CM

## 2023-02-07 DIAGNOSIS — I10 HYPERTENSION, ESSENTIAL: ICD-10-CM

## 2023-02-24 RX ORDER — AMLODIPINE BESYLATE 10 MG/1
TABLET ORAL
Qty: 90 TABLET | Refills: 0 | Status: SHIPPED | OUTPATIENT
Start: 2023-02-24

## 2023-03-30 RX ORDER — AMLODIPINE BESYLATE 10 MG/1
TABLET ORAL
Qty: 90 TABLET | Refills: 0 | Status: SHIPPED | OUTPATIENT
Start: 2023-03-30

## 2023-05-02 DIAGNOSIS — N18.4 CKD (CHRONIC KIDNEY DISEASE) STAGE 4, GFR 15-29 ML/MIN (HCC): ICD-10-CM

## 2023-05-02 DIAGNOSIS — N17.9 AKI (ACUTE KIDNEY INJURY) (HCC): ICD-10-CM

## 2023-05-02 DIAGNOSIS — I10 HYPERTENSION, ESSENTIAL: ICD-10-CM

## 2023-05-04 RX ORDER — AMLODIPINE BESYLATE 10 MG/1
TABLET ORAL
Qty: 90 TABLET | Refills: 0 | OUTPATIENT
Start: 2023-05-04

## 2023-05-09 ENCOUNTER — HOSPITAL ENCOUNTER (OUTPATIENT)
Age: 78
Discharge: HOME | End: 2023-05-12
Payer: MEDICARE

## 2023-05-09 DIAGNOSIS — N17.9 AKI (ACUTE KIDNEY INJURY) (HCC): ICD-10-CM

## 2023-05-09 DIAGNOSIS — N18.4 CKD (CHRONIC KIDNEY DISEASE) STAGE 4, GFR 15-29 ML/MIN (HCC): ICD-10-CM

## 2023-05-09 DIAGNOSIS — I10 HYPERTENSION, ESSENTIAL: ICD-10-CM

## 2023-05-09 LAB
ALBUMIN SERPL-MCNC: 3.3 G/DL (ref 3.4–5)
ANION GAP SERPL CALC-SCNC: 6 MMOL/L (ref 3–18)
BASOPHILS # BLD: 0.1 K/UL (ref 0–0.1)
BASOPHILS NFR BLD: 1 % (ref 0–2)
BUN SERPL-MCNC: 41 MG/DL (ref 7–18)
BUN/CREAT SERPL: 16 (ref 12–20)
CA-I BLD-MCNC: 8.9 MG/DL (ref 8.5–10.1)
CA-I BLD-MCNC: 9.3 MG/DL (ref 8.5–10.1)
CHLORIDE SERPL-SCNC: 109 MMOL/L (ref 100–111)
CO2 SERPL-SCNC: 23 MMOL/L (ref 21–32)
CREAT SERPL-MCNC: 2.64 MG/DL (ref 0.6–1.3)
DIFFERENTIAL METHOD BLD: ABNORMAL
EOSINOPHIL # BLD: 0.3 K/UL (ref 0–0.4)
EOSINOPHIL NFR BLD: 5 % (ref 0–5)
ERYTHROCYTE [DISTWIDTH] IN BLOOD BY AUTOMATED COUNT: 14.9 % (ref 11.6–14.5)
GLUCOSE SERPL-MCNC: 110 MG/DL (ref 74–99)
HCT VFR BLD AUTO: 37.3 % (ref 35–45)
HGB BLD-MCNC: 12 G/DL (ref 12–16)
IMM GRANULOCYTES # BLD AUTO: 0 K/UL (ref 0–0.04)
IMM GRANULOCYTES NFR BLD AUTO: 0 % (ref 0–0.5)
LYMPHOCYTES # BLD: 1.6 K/UL (ref 0.9–3.6)
LYMPHOCYTES NFR BLD: 31 % (ref 21–52)
MCH RBC QN AUTO: 27.2 PG (ref 24–34)
MCHC RBC AUTO-ENTMCNC: 32.2 G/DL (ref 31–37)
MCV RBC AUTO: 84.6 FL (ref 78–100)
MONOCYTES # BLD: 0.5 K/UL (ref 0.05–1.2)
MONOCYTES NFR BLD: 10 % (ref 3–10)
NEUTS SEG # BLD: 2.8 K/UL (ref 1.8–8)
NEUTS SEG NFR BLD: 53 % (ref 40–73)
NRBC # BLD: 0 K/UL (ref 0–0.01)
NRBC BLD-RTO: 0 PER 100 WBC
PHOSPHATE SERPL-MCNC: 3.5 MG/DL (ref 2.5–4.9)
PLATELET # BLD AUTO: 272 K/UL (ref 135–420)
PMV BLD AUTO: 11.1 FL (ref 9.2–11.8)
POTASSIUM SERPL-SCNC: 3.8 MMOL/L (ref 3.5–5.5)
PTH-INTACT SERPL-MCNC: 77.2 PG/ML (ref 18.4–88)
RBC # BLD AUTO: 4.41 M/UL (ref 4.2–5.3)
SODIUM SERPL-SCNC: 138 MMOL/L (ref 136–145)
WBC # BLD AUTO: 5.3 K/UL (ref 4.6–13.2)

## 2023-05-09 PROCEDURE — 85025 COMPLETE CBC W/AUTO DIFF WBC: CPT

## 2023-05-09 PROCEDURE — 83970 ASSAY OF PARATHORMONE: CPT

## 2023-05-09 PROCEDURE — 80069 RENAL FUNCTION PANEL: CPT

## 2023-05-09 PROCEDURE — 36415 COLL VENOUS BLD VENIPUNCTURE: CPT

## 2023-05-16 ENCOUNTER — OFFICE VISIT (OUTPATIENT)
Facility: CLINIC | Age: 78
End: 2023-05-16
Payer: MEDICARE

## 2023-05-16 ENCOUNTER — HOSPITAL ENCOUNTER (OUTPATIENT)
Age: 78
Discharge: HOME | End: 2023-05-19
Payer: MEDICARE

## 2023-05-16 VITALS
WEIGHT: 186.4 LBS | OXYGEN SATURATION: 98 % | HEIGHT: 64 IN | TEMPERATURE: 98.2 F | HEART RATE: 59 BPM | RESPIRATION RATE: 18 BRPM | SYSTOLIC BLOOD PRESSURE: 157 MMHG | BODY MASS INDEX: 31.82 KG/M2 | DIASTOLIC BLOOD PRESSURE: 67 MMHG

## 2023-05-16 DIAGNOSIS — I73.9 PERIPHERAL VASCULAR DISEASE, UNSPECIFIED (HCC): ICD-10-CM

## 2023-05-16 DIAGNOSIS — I10 ESSENTIAL (PRIMARY) HYPERTENSION: Primary | ICD-10-CM

## 2023-05-16 DIAGNOSIS — E78.2 MIXED HYPERLIPIDEMIA: ICD-10-CM

## 2023-05-16 DIAGNOSIS — R79.9 ABNORMAL FINDING OF BLOOD CHEMISTRY, UNSPECIFIED: ICD-10-CM

## 2023-05-16 DIAGNOSIS — R80.9 PROTEINURIA, UNSPECIFIED TYPE: ICD-10-CM

## 2023-05-16 DIAGNOSIS — Z13.1 DIABETES MELLITUS SCREENING: ICD-10-CM

## 2023-05-16 DIAGNOSIS — N18.4 CHRONIC KIDNEY DISEASE, STAGE 4 (SEVERE) (HCC): ICD-10-CM

## 2023-05-16 DIAGNOSIS — G47.9 SLEEP DISTURBANCE: ICD-10-CM

## 2023-05-16 DIAGNOSIS — E55.9 VITAMIN D DEFICIENCY, UNSPECIFIED: ICD-10-CM

## 2023-05-16 DIAGNOSIS — R68.89 OTHER GENERAL SYMPTOMS AND SIGNS: ICD-10-CM

## 2023-05-16 LAB
APPEARANCE UR: ABNORMAL
BACTERIA URNS QL MICRO: ABNORMAL /HPF
BILIRUB UR QL: NEGATIVE
CHOLEST SERPL-MCNC: 143 MG/DL
COLOR UR: YELLOW
EPITH CASTS URNS QL MICRO: ABNORMAL /LPF (ref 0–20)
EST. AVERAGE GLUCOSE BLD GHB EST-MCNC: 134 MG/DL
GLUCOSE UR STRIP.AUTO-MCNC: NEGATIVE MG/DL
HBA1C MFR BLD: 6.3 % (ref 4.2–5.6)
HDLC SERPL-MCNC: 49 MG/DL (ref 40–60)
HDLC SERPL: 2.9 (ref 0–5)
HGB UR QL STRIP: ABNORMAL
KETONES UR QL STRIP.AUTO: NEGATIVE MG/DL
LDLC SERPL CALC-MCNC: 71.8 MG/DL (ref 0–100)
LEUKOCYTE ESTERASE UR QL STRIP.AUTO: ABNORMAL
LIPID PANEL: NORMAL
NITRITE UR QL STRIP.AUTO: NEGATIVE
PH UR STRIP: 5.5 (ref 5–8)
PROT UR STRIP-MCNC: 30 MG/DL
RBC #/AREA URNS HPF: ABNORMAL /HPF (ref 0–2)
SP GR UR REFRACTOMETRY: 1.01 (ref 1–1.03)
TRIGL SERPL-MCNC: 111 MG/DL
TSH SERPL DL<=0.05 MIU/L-ACNC: 1.67 UIU/ML (ref 0.36–3.74)
UROBILINOGEN UR QL STRIP.AUTO: 0.2 EU/DL (ref 0.2–1)
VIT B12 SERPL-MCNC: 489 PG/ML (ref 211–911)
VLDLC SERPL CALC-MCNC: 22.2 MG/DL
WBC URNS QL MICRO: ABNORMAL /HPF (ref 0–4)

## 2023-05-16 PROCEDURE — 36415 COLL VENOUS BLD VENIPUNCTURE: CPT

## 2023-05-16 PROCEDURE — 1036F TOBACCO NON-USER: CPT | Performed by: NURSE PRACTITIONER

## 2023-05-16 PROCEDURE — G8417 CALC BMI ABV UP PARAM F/U: HCPCS | Performed by: NURSE PRACTITIONER

## 2023-05-16 PROCEDURE — G8400 PT W/DXA NO RESULTS DOC: HCPCS | Performed by: NURSE PRACTITIONER

## 2023-05-16 PROCEDURE — 83036 HEMOGLOBIN GLYCOSYLATED A1C: CPT

## 2023-05-16 PROCEDURE — 87086 URINE CULTURE/COLONY COUNT: CPT

## 2023-05-16 PROCEDURE — G8427 DOCREV CUR MEDS BY ELIG CLIN: HCPCS | Performed by: NURSE PRACTITIONER

## 2023-05-16 PROCEDURE — 99214 OFFICE O/P EST MOD 30 MIN: CPT | Performed by: NURSE PRACTITIONER

## 2023-05-16 PROCEDURE — 3078F DIAST BP <80 MM HG: CPT | Performed by: NURSE PRACTITIONER

## 2023-05-16 PROCEDURE — 82607 VITAMIN B-12: CPT

## 2023-05-16 PROCEDURE — 1123F ACP DISCUSS/DSCN MKR DOCD: CPT | Performed by: NURSE PRACTITIONER

## 2023-05-16 PROCEDURE — 80061 LIPID PANEL: CPT

## 2023-05-16 PROCEDURE — 1090F PRES/ABSN URINE INCON ASSESS: CPT | Performed by: NURSE PRACTITIONER

## 2023-05-16 PROCEDURE — 84443 ASSAY THYROID STIM HORMONE: CPT

## 2023-05-16 PROCEDURE — 3077F SYST BP >= 140 MM HG: CPT | Performed by: NURSE PRACTITIONER

## 2023-05-16 PROCEDURE — 81001 URINALYSIS AUTO W/SCOPE: CPT

## 2023-05-16 RX ORDER — ERGOCALCIFEROL 1.25 MG/1
50000 CAPSULE ORAL WEEKLY
Qty: 13 CAPSULE | Refills: 3 | Status: SHIPPED | OUTPATIENT
Start: 2023-05-16

## 2023-05-16 SDOH — ECONOMIC STABILITY: INCOME INSECURITY: HOW HARD IS IT FOR YOU TO PAY FOR THE VERY BASICS LIKE FOOD, HOUSING, MEDICAL CARE, AND HEATING?: NOT VERY HARD

## 2023-05-16 SDOH — ECONOMIC STABILITY: FOOD INSECURITY: WITHIN THE PAST 12 MONTHS, YOU WORRIED THAT YOUR FOOD WOULD RUN OUT BEFORE YOU GOT MONEY TO BUY MORE.: NEVER TRUE

## 2023-05-16 SDOH — ECONOMIC STABILITY: HOUSING INSECURITY
IN THE LAST 12 MONTHS, WAS THERE A TIME WHEN YOU DID NOT HAVE A STEADY PLACE TO SLEEP OR SLEPT IN A SHELTER (INCLUDING NOW)?: NO

## 2023-05-16 SDOH — ECONOMIC STABILITY: FOOD INSECURITY: WITHIN THE PAST 12 MONTHS, THE FOOD YOU BOUGHT JUST DIDN'T LAST AND YOU DIDN'T HAVE MONEY TO GET MORE.: NEVER TRUE

## 2023-05-16 NOTE — PROGRESS NOTES
History of Present Illness  Serene Adamson is a 69 y.o. female who presents today for:    Chief Complaint   Patient presents with    Follow-up     6m follow up. Past Medical History  Past Medical History:   Diagnosis Date    Edema of foot 1/11/2016    Hyperlipidemia 6/24/2015    Hypertension, essential 9/20/2016    Menopause     Osteoarthritis of right knee 8/8/2019    Pelvic mass 7/21/2016    Urinary incontinence 6/27/2016        Surgical History  Past Surgical History:   Procedure Laterality Date    COLONOSCOPY  07/01/2011    DIVERTICULA IN THE LEFT COLON- RECOMMENDED 5 YEAR F/U    HYSTERECTOMY (CERVIX STATUS UNKNOWN)  UNKNOWN    PARTIAL- STILL HAS OVARIES        Current Medications  Current Outpatient Medications   Medication Sig    amLODIPine (NORVASC) 10 MG tablet TAKE 1 TABLET DAILY    aspirin 81 MG chewable tablet Take 1 tablet by mouth daily    atenolol (TENORMIN) 25 MG tablet TAKE 1 TABLET TWICE A DAY (NEW DIRECTIONS)    atorvastatin (LIPITOR) 40 MG tablet TAKE 1 TABLET DAILY    Calcium Carb-Cholecalciferol 500-10 MG-MCG TABS Take by mouth    vitamin D3 (CHOLECALCIFEROL) 125 MCG (5000 UT) TABS tablet Take 1 tablet by mouth daily    furosemide (LASIX) 40 MG tablet Take 1 Tablet by mouth daily as needed (swelling). losartan (COZAAR) 25 MG tablet Take 25 mg by mouth daily (Patient not taking: Reported on 5/16/2023)     No current facility-administered medications for this visit.        Allergies/Drug Reactions  No Known Allergies     Family History  Family History   Problem Relation Age of Onset    Osteoarthritis Son         MENTALLY DELAYED    Breast Cancer Sister     Kidney Disease Brother         KIDNEY TRANSPLANT        Social History  Social History     Tobacco Use    Smoking status: Never    Smokeless tobacco: Never   Substance Use Topics    Alcohol use: Never    Drug use: Never        Health Maintenance   Topic Date Due    Shingles vaccine (1 of 2) Never done    COVID-19 Vaccine (5 - Booster for

## 2023-05-16 NOTE — PROGRESS NOTES
Joycelyn Cervantes presents today for   Chief Complaint   Patient presents with    Follow-up     6m follow up. Is someone accompanying this pt? no    Is the patient using any DME equipment during OV? no    Health Maintenance reviewed and discussed and ordered per Provider. Health Maintenance Due   Topic Date Due    Shingles vaccine (1 of 2) Never done    COVID-19 Vaccine (5 - Booster for Moderna series) 07/12/2022    Lipids  04/11/2023   . Coordination of Care:  1. \"Have you been to the ER, urgent care clinic since your last visit? Hospitalized since your last visit? \" No    2. \"Have you seen or consulted any other health care providers outside of the [address] [place] [address] since your last visit? \" No    3. For patients aged 39-70: Has the patient had a colonoscopy? N/A based on age/sex    If the patient is female:    4. For patients aged 41-77: Has the patient had a mammogram within the past 2 years? N/A based on age/sex    5. For patients aged 21-65: Has the patient had a pap smear?  N/A based on age/sex

## 2023-05-17 LAB
BACTERIA SPEC CULT: NORMAL
Lab: NORMAL

## 2023-05-18 ENCOUNTER — OFFICE VISIT (OUTPATIENT)
Facility: CLINIC | Age: 78
End: 2023-05-18
Payer: MEDICARE

## 2023-05-18 VITALS
OXYGEN SATURATION: 98 % | DIASTOLIC BLOOD PRESSURE: 78 MMHG | BODY MASS INDEX: 31.72 KG/M2 | WEIGHT: 185.8 LBS | SYSTOLIC BLOOD PRESSURE: 150 MMHG | HEART RATE: 55 BPM | HEIGHT: 64 IN

## 2023-05-18 DIAGNOSIS — N17.1 ACUTE RENAL FAILURE WITH ACUTE CORTICAL NECROSIS (HCC): ICD-10-CM

## 2023-05-18 DIAGNOSIS — I10 ESSENTIAL (PRIMARY) HYPERTENSION: ICD-10-CM

## 2023-05-18 DIAGNOSIS — N18.4 CHRONIC KIDNEY DISEASE, STAGE 4 (SEVERE) (HCC): Primary | ICD-10-CM

## 2023-05-18 PROCEDURE — G8400 PT W/DXA NO RESULTS DOC: HCPCS | Performed by: INTERNAL MEDICINE

## 2023-05-18 PROCEDURE — G8427 DOCREV CUR MEDS BY ELIG CLIN: HCPCS | Performed by: INTERNAL MEDICINE

## 2023-05-18 PROCEDURE — 3077F SYST BP >= 140 MM HG: CPT | Performed by: INTERNAL MEDICINE

## 2023-05-18 PROCEDURE — 99214 OFFICE O/P EST MOD 30 MIN: CPT | Performed by: INTERNAL MEDICINE

## 2023-05-18 PROCEDURE — 1036F TOBACCO NON-USER: CPT | Performed by: INTERNAL MEDICINE

## 2023-05-18 PROCEDURE — G8417 CALC BMI ABV UP PARAM F/U: HCPCS | Performed by: INTERNAL MEDICINE

## 2023-05-18 PROCEDURE — 1123F ACP DISCUSS/DSCN MKR DOCD: CPT | Performed by: INTERNAL MEDICINE

## 2023-05-18 PROCEDURE — 3078F DIAST BP <80 MM HG: CPT | Performed by: INTERNAL MEDICINE

## 2023-05-18 PROCEDURE — 1090F PRES/ABSN URINE INCON ASSESS: CPT | Performed by: INTERNAL MEDICINE

## 2023-05-18 RX ORDER — CIPROFLOXACIN 250 MG/1
250 TABLET, FILM COATED ORAL DAILY
Qty: 3 TABLET | Refills: 0 | Status: SHIPPED | OUTPATIENT
Start: 2023-05-18 | End: 2023-05-21

## 2023-05-18 NOTE — PROGRESS NOTES
Trace Cancer presents today for a follow up visit. Is someone accompanying this pt? No    Is the patient using any DME equipment during OV? NO    Depression Screening:  No flowsheet data found. Learning Assessment:  No flowsheet data found. Abuse Screening:  No flowsheet data found. Fall Risk  No flowsheet data found. ADL  No flowsheet data found. Health Maintenance reviewed and discussed and ordered per Provider. Health Maintenance Due   Topic Date Due    Shingles vaccine (1 of 2) Never done    COVID-19 Vaccine (5 - Booster for Moderna series) 07/12/2022   . Coordination of Care:  1. Have you been to the ER, urgent care clinic since your last visit? Hospitalized since your last visit? NO    2. Have you seen or consulted any other health care providers outside of the [address] [place] [address] since your last visit? Include any pap smears or colon screening.  NO
BMI           PHYSICAL EXAM:       General: NAD, alert, cooperative   Eyes: sclera anicteric   Oral Cavity: No thrush or ulcers   Neck: no JVD   Chest: Fair bilateral air entry. No Wheezing or Rhonchi. No rales. Heart: normal sounds   Abdomen: soft and non tender    : no ramesh   Lower Extremities: No edema in both legs . Lavanda Candle Good capillary refill    skin: no rash   Neuro: intact, no focals   Psychiatric: non-depressed              LAB DATA REVIEWED:              1049      PROTEIN/CREATININE RATIO, URINE    Collected: 01/26/23 1024  Final result  Specimen: Urine, random          Protein, urine random           Creatinine, urine random                               01/26/23 1503          Color           Appearance           Specific gravity           pH (UA)           Protein           Glucose           Ketone           Bilirubin                               01/26/23 1132          Sodium           Potassium           Chloride           CO2           Anion gap           Glucose           BUN                               01/26/23 1122          WBC           RBC           HGB           HCT           MCV           MCH           MCHC           RDW           PLATELET           MPV           NRBC           ABSOLUTE NRBC           NEUTROPHILS                         Recommendations/Plan:       #1 acute kidney injury on chronic kidney disease stage IV   -Creatinine had increased from 2.3--> 2.6,but now stable at 2.7 on recent labs. 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     -Renal ultrasound shows bilateral cysts which are simple. No need for further work-up. Increased echogenicity seen consistent with medical renal disease   -Her losartan was discontinued 2/2 jeff.   She has

## 2023-07-06 RX ORDER — AMLODIPINE BESYLATE 10 MG/1
TABLET ORAL
Qty: 90 TABLET | Refills: 0 | Status: SHIPPED | OUTPATIENT
Start: 2023-07-06

## 2023-07-06 RX ORDER — CIPROFLOXACIN 250 MG/1
TABLET, FILM COATED ORAL
Qty: 7 TABLET | Refills: 0 | Status: SHIPPED | OUTPATIENT
Start: 2023-07-06

## 2023-09-20 RX ORDER — ATORVASTATIN CALCIUM 40 MG/1
TABLET, FILM COATED ORAL
Qty: 90 TABLET | Refills: 1 | Status: SHIPPED | OUTPATIENT
Start: 2023-09-20

## 2023-10-05 ENCOUNTER — HOSPITAL ENCOUNTER (OUTPATIENT)
Age: 78
Discharge: HOME | End: 2023-10-05
Payer: MEDICARE

## 2023-10-05 DIAGNOSIS — N18.4 CHRONIC KIDNEY DISEASE, STAGE 4 (SEVERE) (HCC): ICD-10-CM

## 2023-10-05 DIAGNOSIS — N17.1 ACUTE RENAL FAILURE WITH ACUTE CORTICAL NECROSIS (HCC): ICD-10-CM

## 2023-10-05 DIAGNOSIS — I10 ESSENTIAL (PRIMARY) HYPERTENSION: ICD-10-CM

## 2023-10-05 LAB
ALBUMIN SERPL-MCNC: 3.4 G/DL (ref 3.4–5)
ANION GAP SERPL CALC-SCNC: 8 MMOL/L (ref 3–18)
APPEARANCE UR: ABNORMAL
BACTERIA URNS QL MICRO: ABNORMAL /HPF
BASOPHILS # BLD: 0.1 K/UL (ref 0–0.1)
BASOPHILS NFR BLD: 1 % (ref 0–2)
BILIRUB UR QL: NEGATIVE
BUN SERPL-MCNC: 47 MG/DL (ref 7–18)
BUN/CREAT SERPL: 16 (ref 12–20)
CA-I BLD-MCNC: 9 MG/DL (ref 8.5–10.1)
CA-I BLD-MCNC: 9.2 MG/DL (ref 8.5–10.1)
CHLORIDE SERPL-SCNC: 110 MMOL/L (ref 100–111)
CO2 SERPL-SCNC: 23 MMOL/L (ref 21–32)
COLOR UR: YELLOW
CREAT SERPL-MCNC: 2.98 MG/DL (ref 0.6–1.3)
CREAT UR-MCNC: 102 MG/DL (ref 30–125)
DIFFERENTIAL METHOD BLD: ABNORMAL
EOSINOPHIL # BLD: 0.3 K/UL (ref 0–0.4)
EOSINOPHIL NFR BLD: 4 % (ref 0–5)
EPITH CASTS URNS QL MICRO: ABNORMAL /LPF (ref 0–20)
ERYTHROCYTE [DISTWIDTH] IN BLOOD BY AUTOMATED COUNT: 14.9 % (ref 11.6–14.5)
GLUCOSE SERPL-MCNC: 100 MG/DL (ref 74–99)
GLUCOSE UR STRIP.AUTO-MCNC: NEGATIVE MG/DL
HCT VFR BLD AUTO: 36.5 % (ref 35–45)
HGB BLD-MCNC: 11.3 G/DL (ref 12–16)
HGB UR QL STRIP: ABNORMAL
IMM GRANULOCYTES # BLD AUTO: 0 K/UL (ref 0–0.04)
IMM GRANULOCYTES NFR BLD AUTO: 0 % (ref 0–0.5)
KETONES UR QL STRIP.AUTO: NEGATIVE MG/DL
LEUKOCYTE ESTERASE UR QL STRIP.AUTO: ABNORMAL
LYMPHOCYTES # BLD: 1.8 K/UL (ref 0.9–3.6)
LYMPHOCYTES NFR BLD: 31 % (ref 21–52)
MCH RBC QN AUTO: 26.8 PG (ref 24–34)
MCHC RBC AUTO-ENTMCNC: 31 G/DL (ref 31–37)
MCV RBC AUTO: 86.5 FL (ref 78–100)
MONOCYTES # BLD: 0.6 K/UL (ref 0.05–1.2)
MONOCYTES NFR BLD: 10 % (ref 3–10)
NEUTS SEG # BLD: 3.1 K/UL (ref 1.8–8)
NEUTS SEG NFR BLD: 54 % (ref 40–73)
NITRITE UR QL STRIP.AUTO: NEGATIVE
NRBC # BLD: 0 K/UL (ref 0–0.01)
NRBC BLD-RTO: 0 PER 100 WBC
PH UR STRIP: 5.5 (ref 5–8)
PHOSPHATE SERPL-MCNC: 4.1 MG/DL (ref 2.5–4.9)
PLATELET # BLD AUTO: 268 K/UL (ref 135–420)
PMV BLD AUTO: 11.1 FL (ref 9.2–11.8)
POTASSIUM SERPL-SCNC: 4.3 MMOL/L (ref 3.5–5.5)
PROT UR STRIP-MCNC: 100 MG/DL
PROT UR-MCNC: 63 MG/DL
PROT/CREAT UR-RTO: 0.6
PTH-INTACT SERPL-MCNC: 105.2 PG/ML (ref 18.4–88)
RBC # BLD AUTO: 4.22 M/UL (ref 4.2–5.3)
RBC #/AREA URNS HPF: ABNORMAL /HPF (ref 0–2)
SODIUM SERPL-SCNC: 141 MMOL/L (ref 136–145)
SP GR UR REFRACTOMETRY: 1.01 (ref 1–1.03)
UROBILINOGEN UR QL STRIP.AUTO: 0.2 EU/DL (ref 0.2–1)
WBC # BLD AUTO: 5.8 K/UL (ref 4.6–13.2)
WBC URNS QL MICRO: ABNORMAL /HPF (ref 0–4)

## 2023-10-05 PROCEDURE — 83970 ASSAY OF PARATHORMONE: CPT

## 2023-10-05 PROCEDURE — 82570 ASSAY OF URINE CREATININE: CPT

## 2023-10-05 PROCEDURE — 36415 COLL VENOUS BLD VENIPUNCTURE: CPT

## 2023-10-05 PROCEDURE — 81001 URINALYSIS AUTO W/SCOPE: CPT

## 2023-10-05 PROCEDURE — 85025 COMPLETE CBC W/AUTO DIFF WBC: CPT

## 2023-10-05 PROCEDURE — 80069 RENAL FUNCTION PANEL: CPT

## 2023-10-05 PROCEDURE — 84156 ASSAY OF PROTEIN URINE: CPT

## 2023-10-12 ENCOUNTER — OFFICE VISIT (OUTPATIENT)
Facility: CLINIC | Age: 78
End: 2023-10-12
Payer: MEDICARE

## 2023-10-12 VITALS
OXYGEN SATURATION: 98 % | WEIGHT: 182.6 LBS | BODY MASS INDEX: 31.34 KG/M2 | HEART RATE: 84 BPM | DIASTOLIC BLOOD PRESSURE: 74 MMHG | SYSTOLIC BLOOD PRESSURE: 163 MMHG | TEMPERATURE: 97.6 F

## 2023-10-12 DIAGNOSIS — N18.4 CHRONIC KIDNEY DISEASE, STAGE 4 (SEVERE) (HCC): Primary | ICD-10-CM

## 2023-10-12 DIAGNOSIS — I10 ESSENTIAL (PRIMARY) HYPERTENSION: ICD-10-CM

## 2023-10-12 DIAGNOSIS — N17.1 ACUTE RENAL FAILURE WITH ACUTE CORTICAL NECROSIS (HCC): ICD-10-CM

## 2023-10-12 PROCEDURE — G8400 PT W/DXA NO RESULTS DOC: HCPCS | Performed by: INTERNAL MEDICINE

## 2023-10-12 PROCEDURE — G8484 FLU IMMUNIZE NO ADMIN: HCPCS | Performed by: INTERNAL MEDICINE

## 2023-10-12 PROCEDURE — 3078F DIAST BP <80 MM HG: CPT | Performed by: INTERNAL MEDICINE

## 2023-10-12 PROCEDURE — G8417 CALC BMI ABV UP PARAM F/U: HCPCS | Performed by: INTERNAL MEDICINE

## 2023-10-12 PROCEDURE — 1036F TOBACCO NON-USER: CPT | Performed by: INTERNAL MEDICINE

## 2023-10-12 PROCEDURE — 1123F ACP DISCUSS/DSCN MKR DOCD: CPT | Performed by: INTERNAL MEDICINE

## 2023-10-12 PROCEDURE — 99214 OFFICE O/P EST MOD 30 MIN: CPT | Performed by: INTERNAL MEDICINE

## 2023-10-12 PROCEDURE — 1090F PRES/ABSN URINE INCON ASSESS: CPT | Performed by: INTERNAL MEDICINE

## 2023-10-12 PROCEDURE — G8427 DOCREV CUR MEDS BY ELIG CLIN: HCPCS | Performed by: INTERNAL MEDICINE

## 2023-10-12 PROCEDURE — 3077F SYST BP >= 140 MM HG: CPT | Performed by: INTERNAL MEDICINE

## 2023-10-12 RX ORDER — ATENOLOL 50 MG/1
50 TABLET ORAL DAILY
Qty: 30 TABLET | Refills: 3 | Status: SHIPPED | OUTPATIENT
Start: 2023-10-12

## 2023-10-12 RX ORDER — AMLODIPINE BESYLATE 10 MG/1
10 TABLET ORAL DAILY
Qty: 90 TABLET | Refills: 0 | Status: SHIPPED | OUTPATIENT
Start: 2023-10-12

## 2023-10-12 RX ORDER — FUROSEMIDE 40 MG/1
40 TABLET ORAL DAILY
Qty: 30 TABLET | Refills: 1 | Status: SHIPPED | OUTPATIENT
Start: 2023-10-12 | End: 2023-12-11

## 2023-10-12 NOTE — PROGRESS NOTES
Progress  Notes by Tono Mariano MD at 23 1000                    Author: Tono Mariano MD  Service: --  Author Type: Physician       Filed: 23 1223  Encounter Date: 2023  Status: Signed                       Renal Consultation Note      NAME:  Felecia Chou     :   1945    MRN:   [id number]       ATTENDING: No admitting provider for patient encounter. PCP:  Jessica Delaney NP      Date/Time:  2023 12:58 PM             Subjective:      CC : Patient is here for follow-up of her CKD 4      HPI    patient is here for follow-up today. She denies any complaints today. Blood pressure is 148/75 mmHg. She claims compliance with her medications and low-salt diet. Review of recent records shows creatinine was 2.6. She has discontinued taking diclofenac and Aleve at home. Taking Tylenol for pain whicheems to be working okay          Past Medical History:       Diagnosis                                 Past Surgical History:         Procedure                                                Social History            Tobacco Use                                    Family History        Problem                                              No Known Allergies        Prior to Admission medications            Medication           furosemide (LASIX) 40 mg tablet    Calcium-Cholecalciferol, D3, 500 mg-10 mcg (400 unit) tab           losartan (COZAAR) 25 mg tablet           REVIEW OF SYSTEMS:         Constitutional: Negative for chills and fever. HENT: Negative. Eyes: Negative. Respiratory: Negative. Cardiovascular: Negative. Gastrointestinal: Negative for abdominal pain and nausea. Skin: Negative. Neurological: Negative.              Objective:     VITALS:     Visit Vitals        BP  (!) 148/75 (BP 1 Location: Right arm, BP Patient Position: Sitting, BP Cuff Size: Adult)     Pulse  62     Ht  5' 4\" (1.626 m)     Wt  84.2 kg (185 lb 9.6 oz)     SpO2  99%

## 2023-10-12 NOTE — PROGRESS NOTES
Avni Gallagher presents today for   Chief Complaint   Patient presents with    Follow-up       Is someone accompanying this pt? no    Is the patient using any DME equipment during OV? no    Depression Screenin/2/2023     9:31 AM 11/15/2022     9:48 AM 10/20/2022     9:26 AM 2022    11:09 AM 2022    11:02 AM 2022    11:36 AM 2022    11:21 AM   PHQ-9 Questionaire   Little interest or pleasure in doing things 0 0 0 0 0 0 0   Feeling down, depressed, or hopeless 0 0 0 0 0 0 0   PHQ-9 Total Score 0 0 0 0 0 0 0       Fall Risk      2023    10:43 AM   Fall Risk   2 or more falls in past year? no   Fall with injury in past year? no        Health Maintenance reviewed and discussed and ordered per Provider. Health Maintenance Due   Topic Date Due    Shingles vaccine (1 of 2) Never done    Pneumococcal 65+ years Vaccine (2 - PCV) 2015    COVID-19 Vaccine (5 - Moderna series) 2022    Annual Wellness Visit (AWV)  2023    Flu vaccine (1) 2023   . Coordination of Care:    1. \"Have you been to the ER, urgent care clinic since your last visit? Hospitalized since your last visit? \" No    2. \"Have you seen or consulted any other health care providers outside of the [address] [place] [address] since your last visit? \" No

## 2023-11-16 ENCOUNTER — OFFICE VISIT (OUTPATIENT)
Facility: CLINIC | Age: 78
End: 2023-11-16
Payer: MEDICARE

## 2023-11-16 VITALS
OXYGEN SATURATION: 98 % | HEIGHT: 64 IN | WEIGHT: 183 LBS | DIASTOLIC BLOOD PRESSURE: 68 MMHG | RESPIRATION RATE: 18 BRPM | TEMPERATURE: 98.2 F | BODY MASS INDEX: 31.24 KG/M2 | HEART RATE: 53 BPM | SYSTOLIC BLOOD PRESSURE: 144 MMHG

## 2023-11-16 DIAGNOSIS — I10 HYPERTENSION, ESSENTIAL: ICD-10-CM

## 2023-11-16 DIAGNOSIS — Z23 ENCOUNTER FOR IMMUNIZATION: ICD-10-CM

## 2023-11-16 DIAGNOSIS — Z12.31 ENCOUNTER FOR SCREENING MAMMOGRAM FOR MALIGNANT NEOPLASM OF BREAST: ICD-10-CM

## 2023-11-16 DIAGNOSIS — E78.2 MIXED HYPERLIPIDEMIA: ICD-10-CM

## 2023-11-16 DIAGNOSIS — Z00.00 MEDICARE ANNUAL WELLNESS VISIT, SUBSEQUENT: Primary | ICD-10-CM

## 2023-11-16 PROCEDURE — 90694 VACC AIIV4 NO PRSRV 0.5ML IM: CPT | Performed by: NURSE PRACTITIONER

## 2023-11-16 PROCEDURE — 3078F DIAST BP <80 MM HG: CPT | Performed by: NURSE PRACTITIONER

## 2023-11-16 PROCEDURE — G0439 PPPS, SUBSEQ VISIT: HCPCS | Performed by: NURSE PRACTITIONER

## 2023-11-16 PROCEDURE — 1123F ACP DISCUSS/DSCN MKR DOCD: CPT | Performed by: NURSE PRACTITIONER

## 2023-11-16 PROCEDURE — 99214 OFFICE O/P EST MOD 30 MIN: CPT | Performed by: NURSE PRACTITIONER

## 2023-11-16 PROCEDURE — G0008 ADMIN INFLUENZA VIRUS VAC: HCPCS | Performed by: NURSE PRACTITIONER

## 2023-11-16 PROCEDURE — G8484 FLU IMMUNIZE NO ADMIN: HCPCS | Performed by: NURSE PRACTITIONER

## 2023-11-16 PROCEDURE — 3074F SYST BP LT 130 MM HG: CPT | Performed by: NURSE PRACTITIONER

## 2023-11-16 RX ORDER — AMLODIPINE BESYLATE 10 MG/1
10 TABLET ORAL DAILY
Qty: 90 TABLET | Refills: 3 | Status: SHIPPED | OUTPATIENT
Start: 2023-11-16

## 2023-11-16 ASSESSMENT — PATIENT HEALTH QUESTIONNAIRE - PHQ9
SUM OF ALL RESPONSES TO PHQ QUESTIONS 1-9: 0
SUM OF ALL RESPONSES TO PHQ QUESTIONS 1-9: 0
1. LITTLE INTEREST OR PLEASURE IN DOING THINGS: 0
SUM OF ALL RESPONSES TO PHQ QUESTIONS 1-9: 0
SUM OF ALL RESPONSES TO PHQ QUESTIONS 1-9: 0
2. FEELING DOWN, DEPRESSED OR HOPELESS: 0
SUM OF ALL RESPONSES TO PHQ9 QUESTIONS 1 & 2: 0

## 2023-11-16 ASSESSMENT — LIFESTYLE VARIABLES
HOW MANY STANDARD DRINKS CONTAINING ALCOHOL DO YOU HAVE ON A TYPICAL DAY: PATIENT DOES NOT DRINK
HOW OFTEN DO YOU HAVE A DRINK CONTAINING ALCOHOL: NEVER

## 2023-11-16 NOTE — PROGRESS NOTES
China Iraheta presents today for   Chief Complaint   Patient presents with    Follow-up     6m follow up. Is someone accompanying this pt? no    Is the patient using any DME equipment during OV? no    Health Maintenance reviewed and discussed and ordered per Provider. Health Maintenance Due   Topic Date Due    Shingles vaccine (1 of 2) Never done    Pneumococcal 65+ years Vaccine (2 - PCV) 03/17/2015    Annual Wellness Visit (AWV)  06/24/2023    Flu vaccine (1) 08/01/2023    COVID-19 Vaccine (5 - 2023-24 season) 09/01/2023   . Coordination of Care:  1. \"Have you been to the ER, urgent care clinic since your last visit? Hospitalized since your last visit? \" No    2. \"Have you seen or consulted any other health care providers outside of the [address] [place] [address] since your last visit? \" No    3. For patients aged 43-73: Has the patient had a colonoscopy? N/A based on age/sex    If the patient is female:    4. For patients aged 43-66: Has the patient had a mammogram within the past 2 years? N/A based on age/sex    5. For patients aged 21-65: Has the patient had a pap smear?  N/A based on age/sex

## 2023-11-16 NOTE — PROGRESS NOTES
History of Present Illness  Shonda Degroot is a 69 y.o. female who presents today for:    Chief Complaint   Patient presents with    Follow-up     6m follow up. Medicare AWV       Past Medical History  Past Medical History:   Diagnosis Date    Edema of foot 1/11/2016    Hyperlipidemia 6/24/2015    Hypertension, essential 9/20/2016    Menopause     Osteoarthritis of right knee 8/8/2019    Pelvic mass 7/21/2016    Urinary incontinence 6/27/2016        Surgical History  Past Surgical History:   Procedure Laterality Date    COLONOSCOPY  07/01/2011    DIVERTICULA IN THE LEFT COLON- RECOMMENDED 5 YEAR F/U    HYSTERECTOMY (CERVIX STATUS UNKNOWN)  UNKNOWN    PARTIAL- STILL HAS OVARIES        Current Medications  Current Outpatient Medications   Medication Sig    atenolol (TENORMIN) 50 MG tablet Take 1 tablet by mouth daily    amLODIPine (NORVASC) 10 MG tablet Take 1 tablet by mouth daily    atorvastatin (LIPITOR) 40 MG tablet TAKE 1 TABLET DAILY    vitamin D (ERGOCALCIFEROL) 1.25 MG (19000 UT) CAPS capsule Take 1 capsule by mouth once a week    aspirin 81 MG chewable tablet Take 1 tablet by mouth daily    Calcium Carb-Cholecalciferol 500-10 MG-MCG TABS Take by mouth    furosemide (LASIX) 40 MG tablet Take 1 Tablet by mouth daily as needed (swelling). furosemide (LASIX) 40 MG tablet Take 1 tablet by mouth daily (Patient not taking: Reported on 11/16/2023)    ciprofloxacin (CIPRO) 250 MG tablet TAKE 1 TABLET DAILY (Patient not taking: Reported on 10/12/2023)    vitamin D3 (CHOLECALCIFEROL) 125 MCG (5000 UT) TABS tablet Take 1 tablet by mouth daily (Patient not taking: Reported on 11/16/2023)     No current facility-administered medications for this visit.        Allergies/Drug Reactions  No Known Allergies     Family History  Family History   Problem Relation Age of Onset    Osteoarthritis Son         MENTALLY DELAYED    Breast Cancer Sister     Kidney Disease Brother         KIDNEY TRANSPLANT        Social

## 2024-01-18 ENCOUNTER — HOSPITAL ENCOUNTER (OUTPATIENT)
Age: 79
Discharge: HOME | End: 2024-01-18
Payer: MEDICARE

## 2024-01-18 DIAGNOSIS — N17.1 ACUTE RENAL FAILURE WITH ACUTE CORTICAL NECROSIS (HCC): ICD-10-CM

## 2024-01-18 DIAGNOSIS — I10 ESSENTIAL (PRIMARY) HYPERTENSION: ICD-10-CM

## 2024-01-18 DIAGNOSIS — N18.4 CHRONIC KIDNEY DISEASE, STAGE 4 (SEVERE) (HCC): ICD-10-CM

## 2024-01-18 LAB
ALBUMIN SERPL-MCNC: 3.5 G/DL (ref 3.4–5)
ANION GAP SERPL CALC-SCNC: 9 MMOL/L (ref 3–18)
APPEARANCE UR: ABNORMAL
BACTERIA URNS QL MICRO: ABNORMAL /HPF
BASOPHILS # BLD: 0.1 K/UL (ref 0–0.1)
BASOPHILS NFR BLD: 1 % (ref 0–2)
BILIRUB UR QL: NEGATIVE
BUN SERPL-MCNC: 46 MG/DL (ref 7–18)
BUN/CREAT SERPL: 15 (ref 12–20)
CA-I BLD-MCNC: 9.1 MG/DL (ref 8.5–10.1)
CA-I BLD-MCNC: 9.6 MG/DL (ref 8.5–10.1)
CHLORIDE SERPL-SCNC: 109 MMOL/L (ref 100–111)
CO2 SERPL-SCNC: 24 MMOL/L (ref 21–32)
COLOR UR: YELLOW
CREAT SERPL-MCNC: 3.15 MG/DL (ref 0.6–1.3)
CREAT UR-MCNC: 117 MG/DL (ref 30–125)
DIFFERENTIAL METHOD BLD: ABNORMAL
EOSINOPHIL # BLD: 0.2 K/UL (ref 0–0.4)
EOSINOPHIL NFR BLD: 4 % (ref 0–5)
EPITH CASTS URNS QL MICRO: ABNORMAL /LPF (ref 0–20)
ERYTHROCYTE [DISTWIDTH] IN BLOOD BY AUTOMATED COUNT: 14.9 % (ref 11.6–14.5)
GLUCOSE SERPL-MCNC: 99 MG/DL (ref 74–99)
GLUCOSE UR STRIP.AUTO-MCNC: NEGATIVE MG/DL
HCT VFR BLD AUTO: 36.8 % (ref 35–45)
HGB BLD-MCNC: 11.8 G/DL (ref 12–16)
HGB UR QL STRIP: ABNORMAL
IMM GRANULOCYTES # BLD AUTO: 0 K/UL (ref 0–0.04)
IMM GRANULOCYTES NFR BLD AUTO: 0 % (ref 0–0.5)
KETONES UR QL STRIP.AUTO: NEGATIVE MG/DL
LEUKOCYTE ESTERASE UR QL STRIP.AUTO: ABNORMAL
LYMPHOCYTES # BLD: 1.8 K/UL (ref 0.9–3.6)
LYMPHOCYTES NFR BLD: 32 % (ref 21–52)
MCH RBC QN AUTO: 27.6 PG (ref 24–34)
MCHC RBC AUTO-ENTMCNC: 32.1 G/DL (ref 31–37)
MCV RBC AUTO: 86 FL (ref 78–100)
MONOCYTES # BLD: 0.6 K/UL (ref 0.05–1.2)
MONOCYTES NFR BLD: 11 % (ref 3–10)
NEUTS SEG # BLD: 2.8 K/UL (ref 1.8–8)
NEUTS SEG NFR BLD: 52 % (ref 40–73)
NITRITE UR QL STRIP.AUTO: NEGATIVE
NRBC # BLD: 0 K/UL (ref 0–0.01)
NRBC BLD-RTO: 0 PER 100 WBC
PH UR STRIP: 5.5 (ref 5–8)
PHOSPHATE SERPL-MCNC: 3.8 MG/DL (ref 2.5–4.9)
PLATELET # BLD AUTO: 277 K/UL (ref 135–420)
PMV BLD AUTO: 11.8 FL (ref 9.2–11.8)
POTASSIUM SERPL-SCNC: 4.1 MMOL/L (ref 3.5–5.5)
PROT UR STRIP-MCNC: 30 MG/DL
PROT UR-MCNC: 59 MG/DL
PROT/CREAT UR-RTO: 0.5
PTH-INTACT SERPL-MCNC: 146.3 PG/ML (ref 18.4–88)
RBC # BLD AUTO: 4.28 M/UL (ref 4.2–5.3)
RBC #/AREA URNS HPF: ABNORMAL /HPF (ref 0–2)
SODIUM SERPL-SCNC: 142 MMOL/L (ref 136–145)
SP GR UR REFRACTOMETRY: 1.01 (ref 1–1.03)
UROBILINOGEN UR QL STRIP.AUTO: 0.2 EU/DL (ref 0.2–1)
WBC # BLD AUTO: 5.4 K/UL (ref 4.6–13.2)
WBC URNS QL MICRO: ABNORMAL /HPF (ref 0–4)

## 2024-01-18 PROCEDURE — 85025 COMPLETE CBC W/AUTO DIFF WBC: CPT

## 2024-01-18 PROCEDURE — 82570 ASSAY OF URINE CREATININE: CPT

## 2024-01-18 PROCEDURE — 81001 URINALYSIS AUTO W/SCOPE: CPT

## 2024-01-18 PROCEDURE — 83970 ASSAY OF PARATHORMONE: CPT

## 2024-01-18 PROCEDURE — 80069 RENAL FUNCTION PANEL: CPT

## 2024-01-18 PROCEDURE — 36415 COLL VENOUS BLD VENIPUNCTURE: CPT

## 2024-01-18 PROCEDURE — 84156 ASSAY OF PROTEIN URINE: CPT

## 2024-01-25 ENCOUNTER — OFFICE VISIT (OUTPATIENT)
Facility: CLINIC | Age: 79
End: 2024-01-25
Payer: MEDICARE

## 2024-01-25 VITALS — HEIGHT: 64 IN | BODY MASS INDEX: 30.9 KG/M2 | WEIGHT: 181 LBS | HEART RATE: 58 BPM

## 2024-01-25 DIAGNOSIS — N17.1 ACUTE RENAL FAILURE WITH ACUTE CORTICAL NECROSIS (HCC): ICD-10-CM

## 2024-01-25 DIAGNOSIS — I10 ESSENTIAL (PRIMARY) HYPERTENSION: ICD-10-CM

## 2024-01-25 DIAGNOSIS — N18.4 CHRONIC KIDNEY DISEASE, STAGE 4 (SEVERE) (HCC): Primary | ICD-10-CM

## 2024-01-25 PROCEDURE — 1123F ACP DISCUSS/DSCN MKR DOCD: CPT | Performed by: INTERNAL MEDICINE

## 2024-01-25 PROCEDURE — G8400 PT W/DXA NO RESULTS DOC: HCPCS | Performed by: INTERNAL MEDICINE

## 2024-01-25 PROCEDURE — G8428 CUR MEDS NOT DOCUMENT: HCPCS | Performed by: INTERNAL MEDICINE

## 2024-01-25 PROCEDURE — 99214 OFFICE O/P EST MOD 30 MIN: CPT | Performed by: INTERNAL MEDICINE

## 2024-01-25 PROCEDURE — 1090F PRES/ABSN URINE INCON ASSESS: CPT | Performed by: INTERNAL MEDICINE

## 2024-01-25 PROCEDURE — G8484 FLU IMMUNIZE NO ADMIN: HCPCS | Performed by: INTERNAL MEDICINE

## 2024-01-25 PROCEDURE — 1036F TOBACCO NON-USER: CPT | Performed by: INTERNAL MEDICINE

## 2024-01-25 PROCEDURE — G8417 CALC BMI ABV UP PARAM F/U: HCPCS | Performed by: INTERNAL MEDICINE

## 2024-01-25 RX ORDER — AMLODIPINE BESYLATE 10 MG/1
10 TABLET ORAL DAILY
Qty: 90 TABLET | Refills: 3 | Status: SHIPPED | OUTPATIENT
Start: 2024-01-25

## 2024-01-25 RX ORDER — HYDRALAZINE HYDROCHLORIDE 50 MG/1
50 TABLET, FILM COATED ORAL 2 TIMES DAILY
Qty: 180 TABLET | Refills: 2 | Status: SHIPPED | OUTPATIENT
Start: 2024-01-25

## 2024-01-25 RX ORDER — ATENOLOL 50 MG/1
50 TABLET ORAL DAILY
Qty: 90 TABLET | Refills: 3 | Status: SHIPPED | OUTPATIENT
Start: 2024-01-25

## 2024-01-25 RX ORDER — CIPROFLOXACIN 250 MG/1
250 TABLET, FILM COATED ORAL DAILY
Qty: 3 TABLET | Refills: 0 | Status: SHIPPED | OUTPATIENT
Start: 2024-01-25 | End: 2024-01-28

## 2024-01-25 NOTE — PROGRESS NOTES
Progress  Notes by Maia Castillo MD at 23 1000                    Author: Maia Castillo MD  Service: --  Author Type: Physician       Filed: 23 1223  Encounter Date: 2023  Status: Signed                       Renal Consultation Note      NAME:  Kelly Qiu     :   1945    MRN:   [id number]       ATTENDING: No admitting provider for patient encounter.   PCP:  Parker Yoder NP      Date/Time:  2023 12:58 PM             Subjective:      CC : Patient is here for follow-up of her CKD 4      HPI    patient is here for follow-up today.     She denies any complaints today.  Blood pressure is 148/75 mmHg.  She claims compliance with her medications and low-salt diet.  Review of recent records shows creatinine was 2.6.   She has discontinued taking diclofenac and Aleve at home.  Taking Tylenol for pain whicheems to be working okay          Past Medical History:       Diagnosis                                 Past Surgical History:         Procedure                                                Social History            Tobacco Use                                    Family History        Problem                                              No Known Allergies        Prior to Admission medications            Medication           furosemide (LASIX) 40 mg tablet    Calcium-Cholecalciferol, D3, 500 mg-10 mcg (400 unit) tab           losartan (COZAAR) 25 mg tablet           REVIEW OF SYSTEMS:         Constitutional: Negative for chills and fever.    HENT: Negative.     Eyes: Negative.     Respiratory: Negative.     Cardiovascular: Negative.     Gastrointestinal: Negative for abdominal pain and nausea.    Skin: Negative.     Neurological: Negative.             Objective:     VITALS:     Visit Vitals        BP  (!) 148/75 (BP 1 Location: Right arm, BP Patient Position: Sitting, BP Cuff Size: Adult)     Pulse  62     Ht  5' 4\" (1.626 m)     Wt  84.2 kg (185 lb 9.6 oz)     SpO2  99%

## 2024-02-26 DIAGNOSIS — E55.9 VITAMIN D DEFICIENCY, UNSPECIFIED: ICD-10-CM

## 2024-02-26 RX ORDER — ERGOCALCIFEROL 1.25 MG/1
50000 CAPSULE ORAL WEEKLY
Qty: 13 CAPSULE | Refills: 1 | Status: SHIPPED | OUTPATIENT
Start: 2024-02-26

## 2024-04-17 RX ORDER — ATORVASTATIN CALCIUM 40 MG/1
40 TABLET, FILM COATED ORAL DAILY
Qty: 90 TABLET | Refills: 3 | Status: SHIPPED | OUTPATIENT
Start: 2024-04-17

## 2024-04-25 ENCOUNTER — HOSPITAL ENCOUNTER (OUTPATIENT)
Age: 79
Discharge: HOME | End: 2024-04-25
Payer: MEDICARE

## 2024-04-25 DIAGNOSIS — I10 ESSENTIAL (PRIMARY) HYPERTENSION: ICD-10-CM

## 2024-04-25 DIAGNOSIS — N17.1 ACUTE RENAL FAILURE WITH ACUTE CORTICAL NECROSIS (HCC): ICD-10-CM

## 2024-04-25 DIAGNOSIS — N18.4 CHRONIC KIDNEY DISEASE, STAGE 4 (SEVERE) (HCC): ICD-10-CM

## 2024-04-25 LAB
ALBUMIN SERPL-MCNC: 3.7 G/DL (ref 3.4–5)
ANION GAP SERPL CALC-SCNC: 10 MMOL/L (ref 3–18)
APPEARANCE UR: ABNORMAL
BACTERIA URNS QL MICRO: ABNORMAL /HPF
BASOPHILS # BLD: 0.1 K/UL (ref 0–0.1)
BASOPHILS NFR BLD: 1 % (ref 0–2)
BILIRUB UR QL: NEGATIVE
BUN SERPL-MCNC: 56 MG/DL (ref 7–18)
BUN/CREAT SERPL: 18 (ref 12–20)
CA-I BLD-MCNC: 9.5 MG/DL (ref 8.5–10.1)
CHLORIDE SERPL-SCNC: 111 MMOL/L (ref 100–111)
CO2 SERPL-SCNC: 21 MMOL/L (ref 21–32)
COLOR UR: YELLOW
CREAT SERPL-MCNC: 3.15 MG/DL (ref 0.6–1.3)
DIFFERENTIAL METHOD BLD: ABNORMAL
EOSINOPHIL # BLD: 0.2 K/UL (ref 0–0.4)
EOSINOPHIL NFR BLD: 4 % (ref 0–5)
EPITH CASTS URNS QL MICRO: ABNORMAL /LPF (ref 0–20)
ERYTHROCYTE [DISTWIDTH] IN BLOOD BY AUTOMATED COUNT: 15.3 % (ref 11.6–14.5)
GLUCOSE SERPL-MCNC: 101 MG/DL (ref 74–99)
GLUCOSE UR STRIP.AUTO-MCNC: NEGATIVE MG/DL
HCT VFR BLD AUTO: 37.7 % (ref 35–45)
HGB BLD-MCNC: 12 G/DL (ref 12–16)
HGB UR QL STRIP: ABNORMAL
IMM GRANULOCYTES # BLD AUTO: 0 K/UL (ref 0–0.04)
IMM GRANULOCYTES NFR BLD AUTO: 0 % (ref 0–0.5)
KETONES UR QL STRIP.AUTO: NEGATIVE MG/DL
LEUKOCYTE ESTERASE UR QL STRIP.AUTO: ABNORMAL
LYMPHOCYTES # BLD: 1.5 K/UL (ref 0.9–3.6)
LYMPHOCYTES NFR BLD: 32 % (ref 21–52)
MCH RBC QN AUTO: 27.5 PG (ref 24–34)
MCHC RBC AUTO-ENTMCNC: 31.8 G/DL (ref 31–37)
MCV RBC AUTO: 86.3 FL (ref 78–100)
MONOCYTES # BLD: 0.6 K/UL (ref 0.05–1.2)
MONOCYTES NFR BLD: 12 % (ref 3–10)
NEUTS SEG # BLD: 2.4 K/UL (ref 1.8–8)
NEUTS SEG NFR BLD: 51 % (ref 40–73)
NITRITE UR QL STRIP.AUTO: NEGATIVE
NRBC # BLD: 0 K/UL (ref 0–0.01)
NRBC BLD-RTO: 0 PER 100 WBC
PH UR STRIP: 5 (ref 5–8)
PHOSPHATE SERPL-MCNC: 3.7 MG/DL (ref 2.5–4.9)
PLATELET # BLD AUTO: 244 K/UL (ref 135–420)
PMV BLD AUTO: 11.4 FL (ref 9.2–11.8)
POTASSIUM SERPL-SCNC: 3.7 MMOL/L (ref 3.5–5.5)
PROT UR STRIP-MCNC: 30 MG/DL
RBC # BLD AUTO: 4.37 M/UL (ref 4.2–5.3)
RBC #/AREA URNS HPF: ABNORMAL /HPF (ref 0–2)
SODIUM SERPL-SCNC: 142 MMOL/L (ref 136–145)
SP GR UR REFRACTOMETRY: 1.01 (ref 1–1.03)
UROBILINOGEN UR QL STRIP.AUTO: 0.2 EU/DL (ref 0.2–1)
WBC # BLD AUTO: 4.7 K/UL (ref 4.6–13.2)
WBC URNS QL MICRO: ABNORMAL /HPF (ref 0–4)

## 2024-04-25 PROCEDURE — 80069 RENAL FUNCTION PANEL: CPT

## 2024-04-25 PROCEDURE — 82570 ASSAY OF URINE CREATININE: CPT

## 2024-04-25 PROCEDURE — 85025 COMPLETE CBC W/AUTO DIFF WBC: CPT

## 2024-04-25 PROCEDURE — 83970 ASSAY OF PARATHORMONE: CPT

## 2024-04-25 PROCEDURE — 84156 ASSAY OF PROTEIN URINE: CPT

## 2024-04-25 PROCEDURE — 36415 COLL VENOUS BLD VENIPUNCTURE: CPT

## 2024-04-25 PROCEDURE — 81001 URINALYSIS AUTO W/SCOPE: CPT

## 2024-04-26 LAB
CA-I BLD-MCNC: 9.2 MG/DL (ref 8.5–10.1)
CREAT UR-MCNC: 97 MG/DL (ref 30–125)
PROT UR-MCNC: 37 MG/DL
PROT/CREAT UR-RTO: 0.4
PTH-INTACT SERPL-MCNC: 110.6 PG/ML (ref 18.4–88)

## 2024-05-02 ENCOUNTER — OFFICE VISIT (OUTPATIENT)
Age: 79
End: 2024-05-02
Payer: MEDICARE

## 2024-05-02 VITALS
HEIGHT: 64 IN | BODY MASS INDEX: 30.7 KG/M2 | DIASTOLIC BLOOD PRESSURE: 62 MMHG | WEIGHT: 179.8 LBS | SYSTOLIC BLOOD PRESSURE: 138 MMHG | HEART RATE: 57 BPM | OXYGEN SATURATION: 99 %

## 2024-05-02 DIAGNOSIS — I10 ESSENTIAL (PRIMARY) HYPERTENSION: ICD-10-CM

## 2024-05-02 DIAGNOSIS — N18.4 CHRONIC KIDNEY DISEASE, STAGE 4 (SEVERE) (HCC): Primary | ICD-10-CM

## 2024-05-02 DIAGNOSIS — N17.1 ACUTE RENAL FAILURE WITH ACUTE CORTICAL NECROSIS (HCC): ICD-10-CM

## 2024-05-02 PROCEDURE — G8427 DOCREV CUR MEDS BY ELIG CLIN: HCPCS | Performed by: INTERNAL MEDICINE

## 2024-05-02 PROCEDURE — G8400 PT W/DXA NO RESULTS DOC: HCPCS | Performed by: INTERNAL MEDICINE

## 2024-05-02 PROCEDURE — G8417 CALC BMI ABV UP PARAM F/U: HCPCS | Performed by: INTERNAL MEDICINE

## 2024-05-02 PROCEDURE — 1123F ACP DISCUSS/DSCN MKR DOCD: CPT | Performed by: INTERNAL MEDICINE

## 2024-05-02 PROCEDURE — 99214 OFFICE O/P EST MOD 30 MIN: CPT | Performed by: INTERNAL MEDICINE

## 2024-05-02 PROCEDURE — 3075F SYST BP GE 130 - 139MM HG: CPT | Performed by: INTERNAL MEDICINE

## 2024-05-02 PROCEDURE — 1036F TOBACCO NON-USER: CPT | Performed by: INTERNAL MEDICINE

## 2024-05-02 PROCEDURE — 3078F DIAST BP <80 MM HG: CPT | Performed by: INTERNAL MEDICINE

## 2024-05-02 PROCEDURE — 1090F PRES/ABSN URINE INCON ASSESS: CPT | Performed by: INTERNAL MEDICINE

## 2024-05-02 NOTE — PROGRESS NOTES
Kelly Qiu presents today for   Chief Complaint   Patient presents with    Follow-up       Is someone accompanying this pt? no    Is the patient using any DME equipment during OV? no    Depression Screenin/16/2023     9:55 AM 2023     9:31 AM 11/15/2022     9:48 AM 10/20/2022     9:26 AM 2022    11:09 AM 2022    11:02 AM 2022    11:36 AM   PHQ-9 Questionaire   Little interest or pleasure in doing things 0 0 0 0 0 0 0   Feeling down, depressed, or hopeless 0 0 0 0 0 0 0   PHQ-9 Total Score 0 0 0 0 0 0 0       Fall Risk      2023     9:58 AM 2023    10:43 AM   Fall Risk   2 or more falls in past year? no no   Fall with injury in past year? no no        Health Maintenance reviewed and discussed and ordered per Provider.    Health Maintenance Due   Topic Date Due    Shingles vaccine (1 of 2) Never done    Respiratory Syncytial Virus (RSV) Pregnant or age 60 yrs+ (1 - 1-dose 60+ series) Never done    Pneumococcal 65+ years Vaccine (2 of 2 - PCV) 2015    COVID-19 Vaccine ( season) 2023    DTaP/Tdap/Td vaccine (2 - Td or Tdap) 2024    Lipids  2024   .        \"Have you been to the ER, urgent care clinic since your last visit?  Hospitalized since your last visit?\"    NO    “Have you seen or consulted any other health care providers outside of [facility] since your last visit?”    NO    krystin Finney           
as needed only(she takes it every 1-2 weeks)   -Advised her to be on a low-salt diet which she is not compliant with yet   -I have asked her to call me if the blood pressure is beyond parameters at home      #3 renal osteodystrophy   -Within target intact parathyroid hormone . normal 25-hydroxy vitamin D level   -Calcium and phosphorus are okay      #4 anemia   -hemoglobin is 10.9-->12  Likely anemia of chronic kidney disease.     -No need for UMESH agents yet.  I will check iron studies with next labs           Thank you for the referral.  I will see her back in the office in 2 months with preclinic labs      ________________________________________________________________________   Signed: Maia Castillo MD

## 2024-05-16 ENCOUNTER — OFFICE VISIT (OUTPATIENT)
Facility: CLINIC | Age: 79
End: 2024-05-16
Payer: MEDICARE

## 2024-05-16 VITALS
RESPIRATION RATE: 18 BRPM | BODY MASS INDEX: 30.83 KG/M2 | WEIGHT: 180.6 LBS | OXYGEN SATURATION: 98 % | SYSTOLIC BLOOD PRESSURE: 138 MMHG | HEIGHT: 64 IN | HEART RATE: 54 BPM | DIASTOLIC BLOOD PRESSURE: 67 MMHG | TEMPERATURE: 97.5 F

## 2024-05-16 DIAGNOSIS — E78.2 MIXED HYPERLIPIDEMIA: ICD-10-CM

## 2024-05-16 DIAGNOSIS — I10 HYPERTENSION, ESSENTIAL: Primary | ICD-10-CM

## 2024-05-16 DIAGNOSIS — R68.89 OTHER GENERAL SYMPTOMS AND SIGNS: ICD-10-CM

## 2024-05-16 DIAGNOSIS — I73.9 PERIPHERAL VASCULAR DISEASE, UNSPECIFIED (HCC): ICD-10-CM

## 2024-05-16 DIAGNOSIS — R79.9 ABNORMAL FINDING OF BLOOD CHEMISTRY, UNSPECIFIED: ICD-10-CM

## 2024-05-16 PROCEDURE — 3078F DIAST BP <80 MM HG: CPT | Performed by: NURSE PRACTITIONER

## 2024-05-16 PROCEDURE — 1090F PRES/ABSN URINE INCON ASSESS: CPT | Performed by: NURSE PRACTITIONER

## 2024-05-16 PROCEDURE — 3075F SYST BP GE 130 - 139MM HG: CPT | Performed by: NURSE PRACTITIONER

## 2024-05-16 PROCEDURE — 99214 OFFICE O/P EST MOD 30 MIN: CPT | Performed by: NURSE PRACTITIONER

## 2024-05-16 PROCEDURE — 1036F TOBACCO NON-USER: CPT | Performed by: NURSE PRACTITIONER

## 2024-05-16 PROCEDURE — G8417 CALC BMI ABV UP PARAM F/U: HCPCS | Performed by: NURSE PRACTITIONER

## 2024-05-16 PROCEDURE — 1123F ACP DISCUSS/DSCN MKR DOCD: CPT | Performed by: NURSE PRACTITIONER

## 2024-05-16 PROCEDURE — G8428 CUR MEDS NOT DOCUMENT: HCPCS | Performed by: NURSE PRACTITIONER

## 2024-05-16 PROCEDURE — G8400 PT W/DXA NO RESULTS DOC: HCPCS | Performed by: NURSE PRACTITIONER

## 2024-05-16 SDOH — ECONOMIC STABILITY: FOOD INSECURITY: WITHIN THE PAST 12 MONTHS, THE FOOD YOU BOUGHT JUST DIDN'T LAST AND YOU DIDN'T HAVE MONEY TO GET MORE.: NEVER TRUE

## 2024-05-16 SDOH — ECONOMIC STABILITY: FOOD INSECURITY: WITHIN THE PAST 12 MONTHS, YOU WORRIED THAT YOUR FOOD WOULD RUN OUT BEFORE YOU GOT MONEY TO BUY MORE.: NEVER TRUE

## 2024-05-16 SDOH — ECONOMIC STABILITY: INCOME INSECURITY: HOW HARD IS IT FOR YOU TO PAY FOR THE VERY BASICS LIKE FOOD, HOUSING, MEDICAL CARE, AND HEATING?: NOT HARD AT ALL

## 2024-05-16 ASSESSMENT — PATIENT HEALTH QUESTIONNAIRE - PHQ9
SUM OF ALL RESPONSES TO PHQ9 QUESTIONS 1 & 2: 0
SUM OF ALL RESPONSES TO PHQ QUESTIONS 1-9: 0
1. LITTLE INTEREST OR PLEASURE IN DOING THINGS: NOT AT ALL
2. FEELING DOWN, DEPRESSED OR HOPELESS: NOT AT ALL

## 2024-05-16 NOTE — PROGRESS NOTES
Kelly Qiu presents today for   Chief Complaint   Patient presents with    Follow-up     6m follow up        Is someone accompanying this pt? no    Is the patient using any DME equipment during OV? no    Health Maintenance reviewed and discussed and ordered per Provider.    Health Maintenance Due   Topic Date Due    Shingles vaccine (1 of 2) Never done    Respiratory Syncytial Virus (RSV) Pregnant or age 60 yrs+ (1 - 1-dose 60+ series) Never done    Pneumococcal 65+ years Vaccine (2 of 2 - PCV) 03/17/2015    COVID-19 Vaccine (5 - 2023-24 season) 09/01/2023    DTaP/Tdap/Td vaccine (2 - Td or Tdap) 03/17/2024    Lipids  05/16/2024   .      \"Have you been to the ER, urgent care clinic since your last visit?  Hospitalized since your last visit?\"    NO    “Have you seen or consulted any other health care providers outside of [facility] since your last visit?”    NO             
Final    Potassium 04/25/2024 3.7  3.5 - 5.5 mmol/L Final    Chloride 04/25/2024 111  100 - 111 mmol/L Final    CO2 04/25/2024 21  21 - 32 mmol/L Final    Anion Gap 04/25/2024 10  3.0 - 18.0 mmol/L Final    Glucose 04/25/2024 101 (H)  74 - 99 mg/dL Final    BUN 04/25/2024 56 (H)  7 - 18 mg/dL Final    Creatinine 04/25/2024 3.15 (H)  0.60 - 1.30 mg/dL Final    BUN/Creatinine Ratio 04/25/2024 18  12 - 20   Final    Est, Glom Filt Rate 04/25/2024 15 (L)  >60 ml/min/1.73m2 Final    Comment:    Pediatric calculator link: https://www.kidney.org/professionals/kdoqi/gfr_calculatorped    These results are not intended for use in patients <18 years of age.     eGFR results are calculated without a race factor using  the 2021 CKD-EPI equation. Careful clinical correlation is recommended, particularly when comparing to results calculated using previous equations.  The CKD-EPI equation is less accurate in patients with extremes of muscle mass, extra-renal metabolism of creatinine, excessive creatine ingestion, or following therapy that affects renal tubular secretion.      Calcium 04/25/2024 9.5  8.5 - 10.1 mg/dL Final    Phosphorus 04/25/2024 3.7  2.5 - 4.9 mg/dL Final    Albumin 04/25/2024 3.7  3.4 - 5.0 g/dL Final    Protein, Urine, Random 04/25/2024 37 (H)  <11.9 mg/dL Final    Creatinine, Ur 04/25/2024 97.00  30 - 125 mg/dL Final    PROTEIN/CREAT RATIO URINE RAN 04/25/2024 0.4    Final    WBC, UA 04/25/2024   0 - 4 /hpf Final    RBC, UA 04/25/2024 20-50  0 - 2 /hpf Final    Epithelial Cells, UA 04/25/2024 Few  0 - 20 /lpf Final    Epithelial cell category consists of squamous cells and/or transitional urothelial cells. Renal tubular cells, if present, are separately identified as such.    BACTERIA, URINE 04/25/2024 2+ (A)  None /hpf Final     Patient is followed by Dr. Castillo, Nephrology, due to history of CKD Stage IV/V.  At patient's last visit on 5/2/2024 she was given contact information for Kidney Smart Class to

## 2024-07-02 ENCOUNTER — HOSPITAL ENCOUNTER (OUTPATIENT)
Age: 79
Discharge: HOME | End: 2024-07-05
Payer: MEDICARE

## 2024-07-02 DIAGNOSIS — I10 ESSENTIAL (PRIMARY) HYPERTENSION: ICD-10-CM

## 2024-07-02 DIAGNOSIS — E78.2 MIXED HYPERLIPIDEMIA: ICD-10-CM

## 2024-07-02 DIAGNOSIS — R79.9 ABNORMAL FINDING OF BLOOD CHEMISTRY, UNSPECIFIED: ICD-10-CM

## 2024-07-02 DIAGNOSIS — R68.89 OTHER GENERAL SYMPTOMS AND SIGNS: ICD-10-CM

## 2024-07-02 DIAGNOSIS — N18.4 CHRONIC KIDNEY DISEASE, STAGE 4 (SEVERE) (HCC): ICD-10-CM

## 2024-07-02 DIAGNOSIS — N17.1 ACUTE RENAL FAILURE WITH ACUTE CORTICAL NECROSIS (HCC): ICD-10-CM

## 2024-07-02 LAB
BASOPHILS # BLD: 0.1 K/UL (ref 0–0.1)
BASOPHILS NFR BLD: 1 % (ref 0–2)
CHOLEST SERPL-MCNC: 135 MG/DL
CREAT UR-MCNC: 103 MG/DL (ref 30–125)
DIFFERENTIAL METHOD BLD: ABNORMAL
EOSINOPHIL # BLD: 0.2 K/UL (ref 0–0.4)
EOSINOPHIL NFR BLD: 4 % (ref 0–5)
ERYTHROCYTE [DISTWIDTH] IN BLOOD BY AUTOMATED COUNT: 14.3 % (ref 11.6–14.5)
EST. AVERAGE GLUCOSE BLD GHB EST-MCNC: 123 MG/DL
HBA1C MFR BLD: 5.9 % (ref 4.2–5.6)
HCT VFR BLD AUTO: 37.6 % (ref 35–45)
HDLC SERPL-MCNC: 45 MG/DL (ref 40–60)
HDLC SERPL: 3 (ref 0–5)
HGB BLD-MCNC: 11.8 G/DL (ref 12–16)
IMM GRANULOCYTES # BLD AUTO: 0 K/UL (ref 0–0.04)
IMM GRANULOCYTES NFR BLD AUTO: 0 % (ref 0–0.5)
LDLC SERPL CALC-MCNC: 72.2 MG/DL (ref 0–100)
LIPID PANEL: NORMAL
LYMPHOCYTES # BLD: 1.5 K/UL (ref 0.9–3.6)
LYMPHOCYTES NFR BLD: 30 % (ref 21–52)
MCH RBC QN AUTO: 28 PG (ref 24–34)
MCHC RBC AUTO-ENTMCNC: 31.4 G/DL (ref 31–37)
MCV RBC AUTO: 89.1 FL (ref 78–100)
MONOCYTES # BLD: 0.5 K/UL (ref 0.05–1.2)
MONOCYTES NFR BLD: 10 % (ref 3–10)
NEUTS SEG # BLD: 2.7 K/UL (ref 1.8–8)
NEUTS SEG NFR BLD: 55 % (ref 40–73)
NRBC # BLD: 0 K/UL (ref 0–0.01)
NRBC BLD-RTO: 0 PER 100 WBC
PLATELET # BLD AUTO: 253 K/UL (ref 135–420)
PMV BLD AUTO: 11.4 FL (ref 9.2–11.8)
PROT UR-MCNC: 38 MG/DL
PROT/CREAT UR-RTO: 0.4
RBC # BLD AUTO: 4.22 M/UL (ref 4.2–5.3)
TRIGL SERPL-MCNC: 89 MG/DL
TSH SERPL DL<=0.05 MIU/L-ACNC: 1.6 UIU/ML (ref 0.36–3.74)
VIT B12 SERPL-MCNC: 523 PG/ML (ref 211–911)
VLDLC SERPL CALC-MCNC: 17.8 MG/DL
WBC # BLD AUTO: 4.9 K/UL (ref 4.6–13.2)

## 2024-07-02 PROCEDURE — 82570 ASSAY OF URINE CREATININE: CPT

## 2024-07-02 PROCEDURE — 83036 HEMOGLOBIN GLYCOSYLATED A1C: CPT

## 2024-07-02 PROCEDURE — 84156 ASSAY OF PROTEIN URINE: CPT

## 2024-07-02 PROCEDURE — 85025 COMPLETE CBC W/AUTO DIFF WBC: CPT

## 2024-07-02 PROCEDURE — 80061 LIPID PANEL: CPT

## 2024-07-02 PROCEDURE — 84443 ASSAY THYROID STIM HORMONE: CPT

## 2024-07-02 PROCEDURE — 36415 COLL VENOUS BLD VENIPUNCTURE: CPT

## 2024-07-02 PROCEDURE — 82607 VITAMIN B-12: CPT

## 2024-07-10 ENCOUNTER — HOSPITAL ENCOUNTER (OUTPATIENT)
Age: 79
Discharge: HOME | End: 2024-07-13
Payer: MEDICARE

## 2024-07-10 ENCOUNTER — OFFICE VISIT (OUTPATIENT)
Age: 79
End: 2024-07-10
Payer: MEDICARE

## 2024-07-10 VITALS
HEART RATE: 57 BPM | WEIGHT: 176.8 LBS | OXYGEN SATURATION: 98 % | SYSTOLIC BLOOD PRESSURE: 145 MMHG | BODY MASS INDEX: 30.19 KG/M2 | DIASTOLIC BLOOD PRESSURE: 57 MMHG | HEIGHT: 64 IN

## 2024-07-10 DIAGNOSIS — M17.11 PRIMARY OSTEOARTHRITIS OF RIGHT KNEE: ICD-10-CM

## 2024-07-10 DIAGNOSIS — I10 HYPERTENSION, ESSENTIAL: Primary | ICD-10-CM

## 2024-07-10 DIAGNOSIS — N18.4 CKD (CHRONIC KIDNEY DISEASE) STAGE 4, GFR 15-29 ML/MIN (HCC): ICD-10-CM

## 2024-07-10 LAB
ALBUMIN SERPL-MCNC: 3.5 G/DL (ref 3.4–5)
ANION GAP SERPL CALC-SCNC: 10 MMOL/L (ref 3–18)
BUN SERPL-MCNC: 54 MG/DL (ref 7–18)
BUN/CREAT SERPL: 16 (ref 12–20)
CA-I BLD-MCNC: 9.5 MG/DL (ref 8.5–10.1)
CHLORIDE SERPL-SCNC: 106 MMOL/L (ref 100–111)
CO2 SERPL-SCNC: 22 MMOL/L (ref 21–32)
CREAT SERPL-MCNC: 3.29 MG/DL (ref 0.6–1.3)
GLUCOSE SERPL-MCNC: 104 MG/DL (ref 74–99)
PHOSPHATE SERPL-MCNC: 3.5 MG/DL (ref 2.5–4.9)
POTASSIUM SERPL-SCNC: 4 MMOL/L (ref 3.5–5.5)
SODIUM SERPL-SCNC: 138 MMOL/L (ref 136–145)

## 2024-07-10 PROCEDURE — 80069 RENAL FUNCTION PANEL: CPT

## 2024-07-10 PROCEDURE — 1090F PRES/ABSN URINE INCON ASSESS: CPT | Performed by: INTERNAL MEDICINE

## 2024-07-10 PROCEDURE — 1123F ACP DISCUSS/DSCN MKR DOCD: CPT | Performed by: INTERNAL MEDICINE

## 2024-07-10 PROCEDURE — G8427 DOCREV CUR MEDS BY ELIG CLIN: HCPCS | Performed by: INTERNAL MEDICINE

## 2024-07-10 PROCEDURE — G8400 PT W/DXA NO RESULTS DOC: HCPCS | Performed by: INTERNAL MEDICINE

## 2024-07-10 PROCEDURE — G8417 CALC BMI ABV UP PARAM F/U: HCPCS | Performed by: INTERNAL MEDICINE

## 2024-07-10 PROCEDURE — 3077F SYST BP >= 140 MM HG: CPT | Performed by: INTERNAL MEDICINE

## 2024-07-10 PROCEDURE — 99214 OFFICE O/P EST MOD 30 MIN: CPT | Performed by: INTERNAL MEDICINE

## 2024-07-10 PROCEDURE — 3078F DIAST BP <80 MM HG: CPT | Performed by: INTERNAL MEDICINE

## 2024-07-10 PROCEDURE — 1036F TOBACCO NON-USER: CPT | Performed by: INTERNAL MEDICINE

## 2024-07-10 NOTE — PROGRESS NOTES
1. Have you been to the ER, urgent care clinic since your last visit?  Hospitalized since your last visit?No    2. Have you seen or consulted any other health care providers outside of the [facility] since your last visit?  Include any pap smears or colon screening. No    
PG Neutrophils Absolute 2.7 K/UL   MCHC 31.4 g/dL Lymphocytes Absolute 1.5 K/UL   RDW 14.3 % Monocytes Absolute 0.5 K/UL   Platelets 253 K/uL Eosinophils Absolute 0.2 K/UL   MPV 11.4 FL Basophils Absolute 0.1 K/UL   Nucleated RBCs 0.0  WBC Immature Granulocytes Absolute 0.0 K/UL   nRBC 0.00 K/uL Differential Type AUTOMATED     Neutrophils % 55 %     07/02/24 1146TSH   Collected: 07/02/24 1031  Final result  Specimen: Blood Serum   TSH, 3rd Generation 1.60            Recommendations/Plan:       #1 acute kidney injury on chronic kidney disease stage IV/V   -Creatinine has slightly increased from 2.9-->3.1 past year. Stable at 3.1. GFR 15 when labs were last checked in March.  I will do a stat renal panel today  -This is likely her new baseline based on previous labs.  Likely because of progression of renal disease  -Now progression of renal disease to stage V. GFR of only 15.   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     -Renal ultrasound shows bilateral cysts which are simple.  No need for further work-up.  Increased echogenicity seen consistent with medical renal disease   -Her losartan was discontinued 2/2 jeff.  She has minimal proteinuria.  I will continue to hold   -Urine is bland.  Only 0.4 g proteinuria per day   -Check urine and serum electrophoresis  -No impending indication to start dialysis based on symptoms.  Will do a stat renal panel also today  -She has done the kidney smart class and wants to do ICHD.  I will refer her for AV fistula placement  -Follow-up on renal function in 6 weeks      #2 hypertension   -Blood pressure is well controlled in the office and at home   -I will continue Norvasc 10 mg and atenolol to 50 mg daily and hydralazine 50 mg tid   -continue Lasix 40 mg as needed only(she takes it every 1-2 weeks)   -Advised her to be on a low-salt diet which she is not compliant with yet   -I have

## 2024-07-11 DIAGNOSIS — N18.4 CKD (CHRONIC KIDNEY DISEASE) STAGE 4, GFR 15-29 ML/MIN (HCC): Primary | ICD-10-CM

## 2024-07-16 ENCOUNTER — TELEPHONE (OUTPATIENT)
Age: 79
End: 2024-07-16

## 2024-07-16 NOTE — TELEPHONE ENCOUNTER
Called patient at 1155am on 07/16/2024, phone keeps ringing. Called both daughters and left message. Receive referral from Dr. Maia Castillo from [facility] to set up patient for Bilateral Upper Extremity Vein Mapping and see Dr. Staci Garrido to discuss new dialysis access. Patient lives in [place]. Need to schedule same day appointment. Waiting for return call.

## 2024-08-08 ENCOUNTER — CLINICAL DOCUMENTATION (OUTPATIENT)
Age: 79
End: 2024-08-08

## 2024-08-08 NOTE — PROGRESS NOTES
8/8/2024 Tried calling patient 3x to schedule VM order scanned in media, phone would go static then hang up. Will try contacting again next week. VC

## 2024-09-03 DIAGNOSIS — E55.9 VITAMIN D DEFICIENCY, UNSPECIFIED: ICD-10-CM

## 2024-09-03 RX ORDER — ERGOCALCIFEROL 1.25 MG/1
CAPSULE ORAL
Qty: 13 CAPSULE | Refills: 1 | Status: SHIPPED | OUTPATIENT
Start: 2024-09-03

## 2024-09-04 ENCOUNTER — OFFICE VISIT (OUTPATIENT)
Age: 79
End: 2024-09-04
Payer: MEDICARE

## 2024-09-04 VITALS
DIASTOLIC BLOOD PRESSURE: 60 MMHG | HEART RATE: 57 BPM | WEIGHT: 172 LBS | BODY MASS INDEX: 30.48 KG/M2 | SYSTOLIC BLOOD PRESSURE: 136 MMHG | HEIGHT: 63 IN | OXYGEN SATURATION: 98 %

## 2024-09-04 DIAGNOSIS — N18.5 CHRONIC RENAL FAILURE, STAGE 5 (HCC): Primary | ICD-10-CM

## 2024-09-04 PROCEDURE — 99214 OFFICE O/P EST MOD 30 MIN: CPT | Performed by: SURGERY

## 2024-09-04 PROCEDURE — 1036F TOBACCO NON-USER: CPT | Performed by: SURGERY

## 2024-09-04 PROCEDURE — 3078F DIAST BP <80 MM HG: CPT | Performed by: SURGERY

## 2024-09-04 PROCEDURE — G8417 CALC BMI ABV UP PARAM F/U: HCPCS | Performed by: SURGERY

## 2024-09-04 PROCEDURE — 3075F SYST BP GE 130 - 139MM HG: CPT | Performed by: SURGERY

## 2024-09-04 PROCEDURE — G8428 CUR MEDS NOT DOCUMENT: HCPCS | Performed by: SURGERY

## 2024-09-04 PROCEDURE — 1090F PRES/ABSN URINE INCON ASSESS: CPT | Performed by: SURGERY

## 2024-09-04 PROCEDURE — G8400 PT W/DXA NO RESULTS DOC: HCPCS | Performed by: SURGERY

## 2024-09-04 PROCEDURE — 1123F ACP DISCUSS/DSCN MKR DOCD: CPT | Performed by: SURGERY

## 2024-09-06 ENCOUNTER — CLINICAL DOCUMENTATION (OUTPATIENT)
Age: 79
End: 2024-09-06

## 2024-09-06 NOTE — PROGRESS NOTES
Called [facility] at [phone] to schedule patient for cardiac clearance for Left Upper Extremity Brachial-Axillary Arteriovenous Graft Creation with Dr. Diaz. Patient scheduled on September 30, 2024 at 3pm at the [place] office with Alexia Burns NP with Dr. Gaspar.

## 2024-09-13 RX ORDER — FUROSEMIDE 40 MG
40 TABLET ORAL DAILY
Qty: 30 TABLET | Refills: 0 | Status: SHIPPED | OUTPATIENT
Start: 2024-09-13

## 2024-10-07 RX ORDER — HYDRALAZINE HYDROCHLORIDE 50 MG/1
50 TABLET, FILM COATED ORAL 2 TIMES DAILY
Qty: 180 TABLET | Refills: 0 | Status: SHIPPED | OUTPATIENT
Start: 2024-10-07

## 2024-10-07 RX ORDER — AMLODIPINE BESYLATE 10 MG/1
10 TABLET ORAL DAILY
Qty: 90 TABLET | Refills: 0 | Status: SHIPPED | OUTPATIENT
Start: 2024-10-07

## 2024-10-11 ENCOUNTER — HOSPITAL ENCOUNTER (OUTPATIENT)
Age: 79
Setting detail: SPECIMEN
Discharge: HOME | End: 2024-10-14
Payer: MEDICARE

## 2024-10-11 DIAGNOSIS — M17.11 PRIMARY OSTEOARTHRITIS OF RIGHT KNEE: ICD-10-CM

## 2024-10-11 DIAGNOSIS — I10 HYPERTENSION, ESSENTIAL: ICD-10-CM

## 2024-10-11 DIAGNOSIS — N18.4 CKD (CHRONIC KIDNEY DISEASE) STAGE 4, GFR 15-29 ML/MIN (HCC): ICD-10-CM

## 2024-10-11 LAB
ALBUMIN SERPL-MCNC: 3.5 G/DL (ref 3.4–5)
ANION GAP SERPL CALC-SCNC: 7 MMOL/L (ref 3–18)
APPEARANCE UR: ABNORMAL
BACTERIA URNS QL MICRO: ABNORMAL /HPF
BASOPHILS # BLD: 0 K/UL (ref 0–0.1)
BASOPHILS NFR BLD: 1 % (ref 0–2)
BILIRUB UR QL: NEGATIVE
BUN SERPL-MCNC: 63 MG/DL (ref 7–18)
BUN/CREAT SERPL: 17 (ref 12–20)
CA-I BLD-MCNC: 9.3 MG/DL (ref 8.5–10.1)
CA-I BLD-MCNC: 9.4 MG/DL (ref 8.5–10.1)
CHLORIDE SERPL-SCNC: 112 MMOL/L (ref 100–111)
CO2 SERPL-SCNC: 21 MMOL/L (ref 21–32)
COLOR UR: YELLOW
CREAT SERPL-MCNC: 3.74 MG/DL (ref 0.6–1.3)
CREAT UR-MCNC: 120 MG/DL (ref 30–125)
DIFFERENTIAL METHOD BLD: ABNORMAL
EOSINOPHIL # BLD: 0.2 K/UL (ref 0–0.4)
EOSINOPHIL NFR BLD: 4 % (ref 0–5)
EPITH CASTS URNS QL MICRO: ABNORMAL /LPF (ref 0–20)
ERYTHROCYTE [DISTWIDTH] IN BLOOD BY AUTOMATED COUNT: 14.8 % (ref 11.6–14.5)
GLUCOSE SERPL-MCNC: 104 MG/DL (ref 74–99)
GLUCOSE UR STRIP.AUTO-MCNC: NEGATIVE MG/DL
HCT VFR BLD AUTO: 35.6 % (ref 35–45)
HGB BLD-MCNC: 11.2 G/DL (ref 12–16)
HGB UR QL STRIP: NEGATIVE
IMM GRANULOCYTES # BLD AUTO: 0 K/UL (ref 0–0.04)
IMM GRANULOCYTES NFR BLD AUTO: 0 % (ref 0–0.5)
KETONES UR QL STRIP.AUTO: NEGATIVE MG/DL
LEUKOCYTE ESTERASE UR QL STRIP.AUTO: ABNORMAL
LYMPHOCYTES # BLD: 1.3 K/UL (ref 0.9–3.6)
LYMPHOCYTES NFR BLD: 32 % (ref 21–52)
MCH RBC QN AUTO: 26.8 PG (ref 24–34)
MCHC RBC AUTO-ENTMCNC: 31.5 G/DL (ref 31–37)
MCV RBC AUTO: 85.2 FL (ref 78–100)
MONOCYTES # BLD: 0.5 K/UL (ref 0.05–1.2)
MONOCYTES NFR BLD: 11 % (ref 3–10)
NEUTS SEG # BLD: 2.2 K/UL (ref 1.8–8)
NEUTS SEG NFR BLD: 52 % (ref 40–73)
NITRITE UR QL STRIP.AUTO: NEGATIVE
NRBC # BLD: 0 K/UL (ref 0–0.01)
NRBC BLD-RTO: 0 PER 100 WBC
PH UR STRIP: 5.5 (ref 5–8)
PHOSPHATE SERPL-MCNC: 3.8 MG/DL (ref 2.5–4.9)
PLATELET # BLD AUTO: 268 K/UL (ref 135–420)
PMV BLD AUTO: 11.3 FL (ref 9.2–11.8)
POTASSIUM SERPL-SCNC: 4.1 MMOL/L (ref 3.5–5.5)
PROT UR STRIP-MCNC: 30 MG/DL
PROT UR-MCNC: 27 MG/DL
PROT/CREAT UR-RTO: 0.2
PTH-INTACT SERPL-MCNC: 109.2 PG/ML (ref 18.4–88)
RBC # BLD AUTO: 4.18 M/UL (ref 4.2–5.3)
RBC #/AREA URNS HPF: ABNORMAL /HPF (ref 0–2)
SODIUM SERPL-SCNC: 140 MMOL/L (ref 136–145)
SP GR UR REFRACTOMETRY: 1.01 (ref 1–1.03)
UROBILINOGEN UR QL STRIP.AUTO: 0.2 EU/DL (ref 0.2–1)
WBC # BLD AUTO: 4.2 K/UL (ref 4.6–13.2)
WBC URNS QL MICRO: ABNORMAL /HPF (ref 0–4)

## 2024-10-11 PROCEDURE — 85025 COMPLETE CBC W/AUTO DIFF WBC: CPT

## 2024-10-11 PROCEDURE — 82784 ASSAY IGA/IGD/IGG/IGM EACH: CPT

## 2024-10-11 PROCEDURE — 80069 RENAL FUNCTION PANEL: CPT

## 2024-10-11 PROCEDURE — 82570 ASSAY OF URINE CREATININE: CPT

## 2024-10-11 PROCEDURE — 36415 COLL VENOUS BLD VENIPUNCTURE: CPT

## 2024-10-11 PROCEDURE — 83970 ASSAY OF PARATHORMONE: CPT

## 2024-10-11 PROCEDURE — 84156 ASSAY OF PROTEIN URINE: CPT

## 2024-10-11 PROCEDURE — 86334 IMMUNOFIX E-PHORESIS SERUM: CPT

## 2024-10-11 PROCEDURE — 81001 URINALYSIS AUTO W/SCOPE: CPT

## 2024-10-11 PROCEDURE — 84166 PROTEIN E-PHORESIS/URINE/CSF: CPT

## 2024-10-16 LAB
ALBUMIN MFR UR ELPH: 56.5 %
ALPHA1 GLOB MFR UR ELPH: 1.7 %
ALPHA2 GLOB 24H MFR UR ELPH: 8.3 %
B-GLOBULIN 24H MFR UR ELPH: 15 %
GAMMA GLOB 24H MFR UR ELPH: 18.5 %
LABORATORY COMMENT REPORT: NORMAL
M PROTEIN MFR UR ELPH: NORMAL %
PROT UR-MCNC: 21.1 MG/DL

## 2024-11-19 ENCOUNTER — OFFICE VISIT (OUTPATIENT)
Facility: CLINIC | Age: 79
End: 2024-11-19
Payer: MEDICARE

## 2024-11-19 VITALS
OXYGEN SATURATION: 98 % | BODY MASS INDEX: 29.67 KG/M2 | HEART RATE: 58 BPM | HEIGHT: 64 IN | DIASTOLIC BLOOD PRESSURE: 72 MMHG | TEMPERATURE: 98 F | RESPIRATION RATE: 18 BRPM | WEIGHT: 173.8 LBS | SYSTOLIC BLOOD PRESSURE: 128 MMHG

## 2024-11-19 DIAGNOSIS — Z00.00 MEDICARE ANNUAL WELLNESS VISIT, SUBSEQUENT: Primary | ICD-10-CM

## 2024-11-19 DIAGNOSIS — E78.2 MIXED HYPERLIPIDEMIA: ICD-10-CM

## 2024-11-19 DIAGNOSIS — I10 HYPERTENSION, ESSENTIAL: ICD-10-CM

## 2024-11-19 DIAGNOSIS — Z23 ENCOUNTER FOR IMMUNIZATION: ICD-10-CM

## 2024-11-19 DIAGNOSIS — N18.4 CHRONIC KIDNEY DISEASE, STAGE 4 (SEVERE) (HCC): ICD-10-CM

## 2024-11-19 PROCEDURE — 3078F DIAST BP <80 MM HG: CPT | Performed by: NURSE PRACTITIONER

## 2024-11-19 PROCEDURE — 90653 IIV ADJUVANT VACCINE IM: CPT | Performed by: NURSE PRACTITIONER

## 2024-11-19 PROCEDURE — 1159F MED LIST DOCD IN RCRD: CPT | Performed by: NURSE PRACTITIONER

## 2024-11-19 PROCEDURE — G8482 FLU IMMUNIZE ORDER/ADMIN: HCPCS | Performed by: NURSE PRACTITIONER

## 2024-11-19 PROCEDURE — 1160F RVW MEDS BY RX/DR IN RCRD: CPT | Performed by: NURSE PRACTITIONER

## 2024-11-19 PROCEDURE — 1123F ACP DISCUSS/DSCN MKR DOCD: CPT | Performed by: NURSE PRACTITIONER

## 2024-11-19 PROCEDURE — 99214 OFFICE O/P EST MOD 30 MIN: CPT | Performed by: NURSE PRACTITIONER

## 2024-11-19 PROCEDURE — G0008 ADMIN INFLUENZA VIRUS VAC: HCPCS | Performed by: NURSE PRACTITIONER

## 2024-11-19 PROCEDURE — G0439 PPPS, SUBSEQ VISIT: HCPCS | Performed by: NURSE PRACTITIONER

## 2024-11-19 PROCEDURE — 3074F SYST BP LT 130 MM HG: CPT | Performed by: NURSE PRACTITIONER

## 2024-11-19 ASSESSMENT — PATIENT HEALTH QUESTIONNAIRE - PHQ9
SUM OF ALL RESPONSES TO PHQ9 QUESTIONS 1 & 2: 0
SUM OF ALL RESPONSES TO PHQ QUESTIONS 1-9: 0
SUM OF ALL RESPONSES TO PHQ QUESTIONS 1-9: 0
1. LITTLE INTEREST OR PLEASURE IN DOING THINGS: NOT AT ALL
2. FEELING DOWN, DEPRESSED OR HOPELESS: NOT AT ALL
SUM OF ALL RESPONSES TO PHQ QUESTIONS 1-9: 0
SUM OF ALL RESPONSES TO PHQ QUESTIONS 1-9: 0

## 2024-11-19 NOTE — PROGRESS NOTES
Kelly Qiu presents today for   Chief Complaint   Patient presents with    Follow-up     6 month follow up     Medicare AWV       Is someone accompanying this pt? No    Is the patient using any DME equipment during OV? No    Risk Factor Screenings with Interventions     Fall Risk:  2 or more falls in past year?: no  Fall with injury in past year?: no    Alcohol:  Alcohol Use: Not At Risk (11/19/2024)    AUDIT-C     Frequency of Alcohol Consumption: Never     Average Number of Drinks: Patient does not drink     Frequency of Binge Drinking: Never       Depression:  PHQ-2 Score: 0    Cognitive:  Clock Drawing Test (CDT): Normal  Words recalled: 2 Words Recalled  Total Score: 4  Total Score Interpretation: Normal Mini-Cog    Health Risk Assessment:     General  In general, how would you say your health is?: Good  In the past 7 days, have you experienced any of the following: New or Increased Pain, New or Increased Fatigue, Loneliness, Social Isolation, Stress or Anger?: No      Health Habits/Nutrition  On average, how many days per week do you engage in moderate to strenuous exercise (like a brisk walk)?: 0 days  On average, how many minutes do you engage in exercise at this level?: 0 min  Do you eat balanced/healthy meals regularly?: Yes  Have you seen the dentist within the past year?: (!) No  Body mass index is 29.83 kg/m².      Hearing/Vision  Have you had an eye exam within the past year?: Yes  Do you have difficulty driving, watching TV, or doing any of your daily activities because of your eyesight?: No  Do you or your family notice any trouble with your hearing that hasn't been managed with hearing aids?: No      Safety  Do you have working smoke detectors?: Yes  Do you have any tripping hazards - loose or unsecured carpets or rugs?: No  Do you have non-slip mats or non-slip surfaces or shower bars or grab bars in your shower or bathtub?: Yes  Do you always fasten your seatbelt when you are in a car?: 
daily and Amlodipine 10 mg tablet daily for management of essential hypertension.  Mixed hyperlipidemia.  Continue Atorvastatin 40 mg tablet daily for management of mixed hyperlipidemia.      I have discussed the diagnosis with the patient and the intended plan as seen in the above orders.  The patient has received an after-visit summary and questions were answered concerning future plans.  I have discussed medication side effects and warnings with the patient as well. I have reviewed the plan of care with the patient, accepted their input and they are in agreement with the treatment goals.       Parker Yoder, APRN - NP  November 19, 2024      Medicare Annual Wellness Visit    Kelly Qiu is here for Follow-up (6 month follow up ) and Medicare AWV    Assessment & Plan   Medicare annual wellness visit, subsequent  Chronic kidney disease, stage 4 (severe) (HCC)  -     External Referral To Cardiology  Encounter for immunization  Hypertension, essential  Mixed hyperlipidemia    Recommendations for Preventive Services Due: see orders and patient instructions/AVS.  Recommended screening schedule for the next 5-10 years is provided to the patient in written form: see Patient Instructions/AVS.     No follow-ups on file.     Subjective   Patient's complete Health Risk Assessment and screening values have been reviewed and are found in Flowsheets. The following problems were reviewed today and where indicated follow up appointments were made and/or referrals ordered.    Positive Risk Factor Screenings with Interventions:              Inactivity:  On average, how many days per week do you engage in moderate to strenuous exercise (like a brisk walk)?: 0 days (!) Abnormal  On average, how many minutes do you engage in exercise at this level?: 0 min    Interventions:  Patient declined any further interventions or treatment      Dentist Screen:  Have you seen the dentist within the past year?: (!)

## 2024-11-20 NOTE — PATIENT INSTRUCTIONS
Too much alcohol can cause health problems.     Manage other health problems such as diabetes, high blood pressure, and high cholesterol. If you think you may have a problem with alcohol or drug use, talk to your doctor.   Medicines    Take your medicines exactly as prescribed. Call your doctor if you think you are having a problem with your medicine.     If your doctor recommends aspirin, take the amount directed each day. Make sure you take aspirin and not another kind of pain reliever, such as acetaminophen (Tylenol).   When should you call for help?   Call 911 if you have symptoms of a heart attack. These may include:    Chest pain or pressure, or a strange feeling in the chest.     Sweating.     Shortness of breath.     Pain, pressure, or a strange feeling in the back, neck, jaw, or upper belly or in one or both shoulders or arms.     Lightheadedness or sudden weakness.     A fast or irregular heartbeat.   After you call 911, the  may tell you to chew 1 adult-strength or 2 to 4 low-dose aspirin. Wait for an ambulance. Do not try to drive yourself.  Watch closely for changes in your health, and be sure to contact your doctor if you have any problems.  Where can you learn more?  Go to https://www.[employer].net/patientEd and enter F075 to learn more about \"A Healthy Heart: Care Instructions.\"  Current as of: June 24, 2023  Content Version: 14.2  © 2024 [employer].   Care instructions adapted under license by [employer]. If you have questions about a medical condition or this instruction, always ask your healthcare professional. [employer] disclaims any warranty or liability for your use of this information.      Personalized Preventive Plan for Kelly Qiu - 11/19/2024  Medicare offers a range of preventive health benefits. Some of the tests and screenings are paid in full while other may be subject to a deductible, co-insurance, and/or copay.    Some of these benefits

## 2024-11-27 ENCOUNTER — OFFICE VISIT (OUTPATIENT)
Age: 79
End: 2024-11-27
Payer: MEDICARE

## 2024-11-27 VITALS
WEIGHT: 175 LBS | HEIGHT: 64 IN | DIASTOLIC BLOOD PRESSURE: 66 MMHG | SYSTOLIC BLOOD PRESSURE: 151 MMHG | OXYGEN SATURATION: 97 % | BODY MASS INDEX: 29.88 KG/M2 | HEART RATE: 47 BPM

## 2024-11-27 DIAGNOSIS — N18.4 CKD (CHRONIC KIDNEY DISEASE) STAGE 4, GFR 15-29 ML/MIN (HCC): Primary | ICD-10-CM

## 2024-11-27 PROCEDURE — 99214 OFFICE O/P EST MOD 30 MIN: CPT | Performed by: INTERNAL MEDICINE

## 2024-11-27 PROCEDURE — 3077F SYST BP >= 140 MM HG: CPT | Performed by: INTERNAL MEDICINE

## 2024-11-27 PROCEDURE — G8427 DOCREV CUR MEDS BY ELIG CLIN: HCPCS | Performed by: INTERNAL MEDICINE

## 2024-11-27 PROCEDURE — 1036F TOBACCO NON-USER: CPT | Performed by: INTERNAL MEDICINE

## 2024-11-27 PROCEDURE — G8417 CALC BMI ABV UP PARAM F/U: HCPCS | Performed by: INTERNAL MEDICINE

## 2024-11-27 PROCEDURE — 3078F DIAST BP <80 MM HG: CPT | Performed by: INTERNAL MEDICINE

## 2024-11-27 PROCEDURE — 1126F AMNT PAIN NOTED NONE PRSNT: CPT | Performed by: INTERNAL MEDICINE

## 2024-11-27 PROCEDURE — G8400 PT W/DXA NO RESULTS DOC: HCPCS | Performed by: INTERNAL MEDICINE

## 2024-11-27 PROCEDURE — 1159F MED LIST DOCD IN RCRD: CPT | Performed by: INTERNAL MEDICINE

## 2024-11-27 PROCEDURE — G8482 FLU IMMUNIZE ORDER/ADMIN: HCPCS | Performed by: INTERNAL MEDICINE

## 2024-11-27 PROCEDURE — 1090F PRES/ABSN URINE INCON ASSESS: CPT | Performed by: INTERNAL MEDICINE

## 2024-11-27 PROCEDURE — 1123F ACP DISCUSS/DSCN MKR DOCD: CPT | Performed by: INTERNAL MEDICINE

## 2024-11-27 RX ORDER — CIPROFLOXACIN 250 MG/1
250 TABLET, FILM COATED ORAL DAILY
Qty: 6 TABLET | Refills: 0 | Status: SHIPPED | OUTPATIENT
Start: 2024-11-27 | End: 2024-12-03

## 2024-11-27 NOTE — PROGRESS NOTES
1. Have you been to the ER, urgent care clinic since your last visit?  Hospitalized since your last visit?No    2. Have you seen or consulted any other health care providers outside of the [facility] since your last visit?  Include any pap smears or colon screening. No

## 2024-11-27 NOTE — PROGRESS NOTES
Progress  Notes by Maia Castillo MD at 2024                   Author: Maia Castillo MD  Service: --  Author Type: Physician       Filed: 24  Encounter Date:   Status: Signed                    Renal Consultation Note      NAME:  Kelly Qiu     :   1945    MRN:   [id number]       ATTENDING: No admitting provider for patient encounter.   PCP:  Parker Yoder NP      Date/Time:  2023 12:58 PM             Subjective:      CC : Patient is here for follow-up of her CKD 4      HPI    patient is here for follow-up today.     She denies any complaints today.  Blood pressure is elevated today but much better on rechecking.  145/72.  She reports it to be well controlled at home   she claims compliance with her medications and low-salt diet.  Last creatinine was 3.1 in 2024.  However no renal panel for this visit  Denies any shortness of breath.  Denies any urinary complaints   She has discontinued taking diclofenac and Aleve at home.  Taking Tylenol for pain   She has done the kidney smart class and wishes to pursue in center hemodialysis  Takes Lasix only once a week             Past Medical History:       Diagnosis                                 Past Surgical History:         Procedure                                                Social History            Tobacco Use                                    Family History        Problem                                              No Known Allergies        Prior to Admission medications            Medication           furosemide (LASIX) 40 mg tablet    Calcium-Cholecalciferol, D3, 500 mg-10 mcg (400 unit) tab           atenoloL (TENORMIN) 25 mg tablet           amLODIPine (NORVASC) 10 mg tablet    atorvastatin (LIPITOR) 40 mg tablet           losartan (COZAAR) 25 mg tablet           REVIEW OF SYSTEMS:         Constitutional: Negative for chills and fever.    HENT: Negative.     Eyes: Negative.     Respiratory: Negative.      0 (no pain/absence of nonverbal indicators of pain)

## 2024-12-20 ENCOUNTER — HOSPITAL ENCOUNTER (OUTPATIENT)
Age: 79
Discharge: HOME | End: 2024-12-23
Payer: MEDICARE

## 2024-12-20 VITALS — BODY MASS INDEX: 29.88 KG/M2 | WEIGHT: 175 LBS | HEIGHT: 64 IN

## 2024-12-20 DIAGNOSIS — Z12.31 VISIT FOR SCREENING MAMMOGRAM: ICD-10-CM

## 2024-12-20 PROCEDURE — 77063 BREAST TOMOSYNTHESIS BI: CPT

## 2025-01-14 ENCOUNTER — TELEPHONE (OUTPATIENT)
Age: 80
End: 2025-01-14

## 2025-01-14 NOTE — TELEPHONE ENCOUNTER
Called and left message at 8:35am with patients daughter, Jess Arnold and tried to call the home number of the patient but number is not available. Patient was scheduled on 09/30/2024 with [facility] for clearance and no showed. They have tried calling patient and has not returned their call. Waiting for patient to call back.

## 2025-03-04 DIAGNOSIS — E55.9 VITAMIN D DEFICIENCY, UNSPECIFIED: ICD-10-CM

## 2025-03-04 RX ORDER — ERGOCALCIFEROL 1.25 MG/1
CAPSULE ORAL
Qty: 13 CAPSULE | Refills: 3 | Status: SHIPPED | OUTPATIENT
Start: 2025-03-04

## 2025-03-05 RX ORDER — FUROSEMIDE 40 MG/1
40 TABLET ORAL DAILY
Qty: 30 TABLET | Refills: 0 | Status: SHIPPED | OUTPATIENT
Start: 2025-03-05

## 2025-03-13 ENCOUNTER — OFFICE VISIT (OUTPATIENT)
Facility: CLINIC | Age: 80
End: 2025-03-13
Payer: MEDICARE

## 2025-03-13 VITALS
HEIGHT: 64 IN | RESPIRATION RATE: 18 BRPM | BODY MASS INDEX: 29.74 KG/M2 | TEMPERATURE: 97.8 F | OXYGEN SATURATION: 99 % | SYSTOLIC BLOOD PRESSURE: 138 MMHG | DIASTOLIC BLOOD PRESSURE: 69 MMHG | WEIGHT: 174.2 LBS | HEART RATE: 56 BPM

## 2025-03-13 DIAGNOSIS — I10 HYPERTENSION, ESSENTIAL: ICD-10-CM

## 2025-03-13 DIAGNOSIS — E78.2 MIXED HYPERLIPIDEMIA: ICD-10-CM

## 2025-03-13 DIAGNOSIS — N18.5 CHRONIC RENAL FAILURE, STAGE 5 (HCC): Primary | ICD-10-CM

## 2025-03-13 DIAGNOSIS — E55.9 VITAMIN D DEFICIENCY: ICD-10-CM

## 2025-03-13 PROCEDURE — 1159F MED LIST DOCD IN RCRD: CPT | Performed by: NURSE PRACTITIONER

## 2025-03-13 PROCEDURE — 3078F DIAST BP <80 MM HG: CPT | Performed by: NURSE PRACTITIONER

## 2025-03-13 PROCEDURE — 1090F PRES/ABSN URINE INCON ASSESS: CPT | Performed by: NURSE PRACTITIONER

## 2025-03-13 PROCEDURE — 3075F SYST BP GE 130 - 139MM HG: CPT | Performed by: NURSE PRACTITIONER

## 2025-03-13 PROCEDURE — 1036F TOBACCO NON-USER: CPT | Performed by: NURSE PRACTITIONER

## 2025-03-13 PROCEDURE — 1123F ACP DISCUSS/DSCN MKR DOCD: CPT | Performed by: NURSE PRACTITIONER

## 2025-03-13 PROCEDURE — G8427 DOCREV CUR MEDS BY ELIG CLIN: HCPCS | Performed by: NURSE PRACTITIONER

## 2025-03-13 PROCEDURE — G8417 CALC BMI ABV UP PARAM F/U: HCPCS | Performed by: NURSE PRACTITIONER

## 2025-03-13 PROCEDURE — G8400 PT W/DXA NO RESULTS DOC: HCPCS | Performed by: NURSE PRACTITIONER

## 2025-03-13 PROCEDURE — 1160F RVW MEDS BY RX/DR IN RCRD: CPT | Performed by: NURSE PRACTITIONER

## 2025-03-13 PROCEDURE — 99214 OFFICE O/P EST MOD 30 MIN: CPT | Performed by: NURSE PRACTITIONER

## 2025-03-13 PROCEDURE — 1126F AMNT PAIN NOTED NONE PRSNT: CPT | Performed by: NURSE PRACTITIONER

## 2025-03-13 RX ORDER — ATORVASTATIN CALCIUM 40 MG/1
40 TABLET, FILM COATED ORAL DAILY
Qty: 90 TABLET | Refills: 3 | Status: SHIPPED | OUTPATIENT
Start: 2025-03-13

## 2025-03-13 RX ORDER — AMLODIPINE BESYLATE 10 MG/1
10 TABLET ORAL DAILY
Qty: 90 TABLET | Refills: 3 | Status: SHIPPED | OUTPATIENT
Start: 2025-03-13

## 2025-03-13 RX ORDER — ATENOLOL 50 MG/1
50 TABLET ORAL DAILY
Qty: 90 TABLET | Refills: 3 | Status: SHIPPED | OUTPATIENT
Start: 2025-03-13

## 2025-03-13 RX ORDER — HYDRALAZINE HYDROCHLORIDE 50 MG/1
50 TABLET, FILM COATED ORAL 2 TIMES DAILY
Qty: 180 TABLET | Refills: 3 | Status: SHIPPED | OUTPATIENT
Start: 2025-03-13

## 2025-03-13 RX ORDER — FUROSEMIDE 40 MG/1
40 TABLET ORAL DAILY PRN
Qty: 30 TABLET | Refills: 2 | Status: SHIPPED | OUTPATIENT
Start: 2025-03-13

## 2025-03-13 SDOH — ECONOMIC STABILITY: FOOD INSECURITY: WITHIN THE PAST 12 MONTHS, YOU WORRIED THAT YOUR FOOD WOULD RUN OUT BEFORE YOU GOT MONEY TO BUY MORE.: NEVER TRUE

## 2025-03-13 SDOH — ECONOMIC STABILITY: FOOD INSECURITY: WITHIN THE PAST 12 MONTHS, THE FOOD YOU BOUGHT JUST DIDN'T LAST AND YOU DIDN'T HAVE MONEY TO GET MORE.: NEVER TRUE

## 2025-03-13 ASSESSMENT — PATIENT HEALTH QUESTIONNAIRE - PHQ9
SUM OF ALL RESPONSES TO PHQ QUESTIONS 1-9: 0
SUM OF ALL RESPONSES TO PHQ QUESTIONS 1-9: 0
2. FEELING DOWN, DEPRESSED OR HOPELESS: NOT AT ALL
SUM OF ALL RESPONSES TO PHQ QUESTIONS 1-9: 0
1. LITTLE INTEREST OR PLEASURE IN DOING THINGS: NOT AT ALL
SUM OF ALL RESPONSES TO PHQ QUESTIONS 1-9: 0

## 2025-03-13 NOTE — PROGRESS NOTES
History of Present Illness  Kelly Qiu is a 79 y.o. female who presents today for:    Chief Complaint   Patient presents with    Follow-up     3m follow up.         Past Medical History  Past Medical History:   Diagnosis Date    Edema of foot 1/11/2016    Hyperlipidemia 6/24/2015    Hypertension, essential 9/20/2016    Menopause     Osteoarthritis of right knee 8/8/2019    Pelvic mass 7/21/2016    Urinary incontinence 6/27/2016        Surgical History  Past Surgical History:   Procedure Laterality Date    COLONOSCOPY  07/01/2011    DIVERTICULA IN THE LEFT COLON- RECOMMENDED 5 YEAR F/U    HYSTERECTOMY (CERVIX STATUS UNKNOWN)  UNKNOWN    PARTIAL- STILL HAS OVARIES        Current Medications  Current Outpatient Medications   Medication Sig    amLODIPine (NORVASC) 10 MG tablet Take 1 tablet by mouth daily    atenolol (TENORMIN) 50 MG tablet Take 1 tablet by mouth daily    atorvastatin (LIPITOR) 40 MG tablet Take 1 tablet by mouth daily    furosemide (LASIX) 40 MG tablet Take 1 tablet by mouth daily as needed (Visible Swelling in Lower Extremities.)    hydrALAZINE (APRESOLINE) 50 MG tablet Take 1 tablet by mouth 2 times daily    Vitamin D, Ergocalciferol, 07358 units CAPS TAKE 1 CAPSULE ONCE A WEEK    aspirin 81 MG chewable tablet Take 1 tablet by mouth daily    Calcium Carb-Cholecalciferol 500-10 MG-MCG TABS Take by mouth     No current facility-administered medications for this visit.       Allergies/Drug Reactions  No Known Allergies     Family History  Family History   Problem Relation Age of Onset    Breast Cancer Sister 57    Kidney Disease Brother         KIDNEY TRANSPLANT    Osteoarthritis Son         MENTALLY DELAYED        Social History  Social History     Tobacco Use    Smoking status: Never    Smokeless tobacco: Never   Vaping Use    Vaping status: Never Used   Substance Use Topics    Alcohol use: Never    Drug use: Never        Health Maintenance   Topic Date Due    Shingles vaccine (1 of 2) Never

## 2025-03-13 NOTE — PROGRESS NOTES
Kelly Robbie Qiu presents today for   Chief Complaint   Patient presents with    Follow-up     3m follow up.         Is someone accompanying this pt? no    Is the patient using any DME equipment during OV? no    Health Maintenance Due   Topic Date Due    Shingles vaccine (1 of 2) Never done    Pneumococcal 50+ years Vaccine (2 of 2 - PCV) 03/17/2015    Respiratory Syncytial Virus (RSV) Pregnant or age 60 yrs+ (1 - 1-dose 75+ series) Never done    DTaP/Tdap/Td vaccine (2 - Td or Tdap) 03/17/2024    COVID-19 Vaccine (5 - 2024-25 season) 09/01/2024         \"Have you been to the ER, urgent care clinic since your last visit?  Hospitalized since your last visit?\"    NO    “Have you seen or consulted any other health care providers outside our system since your last visit?”    NO

## 2025-04-08 ENCOUNTER — HOSPITAL ENCOUNTER (OUTPATIENT)
Age: 80
Setting detail: SPECIMEN
Discharge: HOME | End: 2025-04-11
Payer: MEDICARE

## 2025-04-08 DIAGNOSIS — N18.4 CKD (CHRONIC KIDNEY DISEASE) STAGE 4, GFR 15-29 ML/MIN (HCC): ICD-10-CM

## 2025-04-08 LAB
ALBUMIN SERPL-MCNC: 3.2 G/DL (ref 3.4–5)
ANION GAP SERPL CALC-SCNC: 10 MMOL/L (ref 3–18)
APPEARANCE UR: ABNORMAL
BACTERIA URNS QL MICRO: ABNORMAL /HPF
BASOPHILS # BLD: 0.05 K/UL (ref 0–0.1)
BASOPHILS NFR BLD: 1 % (ref 0–2)
BILIRUB UR QL: NEGATIVE
BUN SERPL-MCNC: 59 MG/DL (ref 7–18)
BUN/CREAT SERPL: 16 (ref 12–20)
CA-I BLD-MCNC: 9.2 MG/DL (ref 8.5–10.1)
CHLORIDE SERPL-SCNC: 108 MMOL/L (ref 100–111)
CO2 SERPL-SCNC: 21 MMOL/L (ref 21–32)
COLOR UR: YELLOW
CREAT SERPL-MCNC: 3.78 MG/DL (ref 0.6–1.3)
DIFFERENTIAL METHOD BLD: ABNORMAL
EOSINOPHIL # BLD: 0.27 K/UL (ref 0–0.4)
EOSINOPHIL NFR BLD: 5.4 % (ref 0–5)
EPITH CASTS URNS QL MICRO: ABNORMAL /LPF (ref 0–20)
ERYTHROCYTE [DISTWIDTH] IN BLOOD BY AUTOMATED COUNT: 14.9 % (ref 11.6–14.5)
GLUCOSE SERPL-MCNC: 108 MG/DL (ref 74–99)
GLUCOSE UR STRIP.AUTO-MCNC: NEGATIVE MG/DL
HCT VFR BLD AUTO: 35 % (ref 35–45)
HGB BLD-MCNC: 11.1 G/DL (ref 12–16)
HGB UR QL STRIP: NEGATIVE
IMM GRANULOCYTES # BLD AUTO: 0.01 K/UL (ref 0–0.04)
IMM GRANULOCYTES NFR BLD AUTO: 0.2 % (ref 0–0.5)
KETONES UR QL STRIP.AUTO: NEGATIVE MG/DL
LEUKOCYTE ESTERASE UR QL STRIP.AUTO: ABNORMAL
LYMPHOCYTES # BLD: 1.46 K/UL (ref 0.9–3.6)
LYMPHOCYTES NFR BLD: 29.3 % (ref 21–52)
MCH RBC QN AUTO: 27.5 PG (ref 24–34)
MCHC RBC AUTO-ENTMCNC: 31.7 G/DL (ref 31–37)
MCV RBC AUTO: 86.6 FL (ref 78–100)
MONOCYTES # BLD: 0.56 K/UL (ref 0.05–1.2)
MONOCYTES NFR BLD: 11.2 % (ref 3–10)
NEUTS SEG # BLD: 2.64 K/UL (ref 1.8–8)
NEUTS SEG NFR BLD: 52.9 % (ref 40–73)
NITRITE UR QL STRIP.AUTO: NEGATIVE
NRBC # BLD: 0 K/UL (ref 0–0.01)
NRBC BLD-RTO: 0 PER 100 WBC
PH UR STRIP: 5.5 (ref 5–8)
PHOSPHATE SERPL-MCNC: 4.3 MG/DL (ref 2.5–4.9)
PLATELET # BLD AUTO: 252 K/UL (ref 135–420)
PMV BLD AUTO: 10.5 FL (ref 9.2–11.8)
POTASSIUM SERPL-SCNC: 4.1 MMOL/L (ref 3.5–5.5)
PROT UR STRIP-MCNC: 30 MG/DL
RBC # BLD AUTO: 4.04 M/UL (ref 4.2–5.3)
RBC #/AREA URNS HPF: ABNORMAL /HPF (ref 0–2)
SODIUM SERPL-SCNC: 139 MMOL/L (ref 136–145)
SP GR UR REFRACTOMETRY: 1.01 (ref 1–1.03)
UROBILINOGEN UR QL STRIP.AUTO: 0.2 EU/DL (ref 0.2–1)
WBC # BLD AUTO: 5 K/UL (ref 4.6–13.2)
WBC URNS QL MICRO: >100 /HPF (ref 0–4)

## 2025-04-08 PROCEDURE — 84156 ASSAY OF PROTEIN URINE: CPT

## 2025-04-08 PROCEDURE — 80069 RENAL FUNCTION PANEL: CPT

## 2025-04-08 PROCEDURE — 36415 COLL VENOUS BLD VENIPUNCTURE: CPT

## 2025-04-08 PROCEDURE — 85025 COMPLETE CBC W/AUTO DIFF WBC: CPT

## 2025-04-08 PROCEDURE — 82570 ASSAY OF URINE CREATININE: CPT

## 2025-04-08 PROCEDURE — 83970 ASSAY OF PARATHORMONE: CPT

## 2025-04-08 PROCEDURE — 81001 URINALYSIS AUTO W/SCOPE: CPT

## 2025-04-09 LAB
CA-I BLD-MCNC: 9.3 MG/DL (ref 8.5–10.1)
PTH-INTACT SERPL-MCNC: 92.9 PG/ML (ref 18.4–88)

## 2025-04-16 ENCOUNTER — OFFICE VISIT (OUTPATIENT)
Age: 80
End: 2025-04-16
Payer: MEDICARE

## 2025-04-16 VITALS
HEART RATE: 57 BPM | BODY MASS INDEX: 29.94 KG/M2 | WEIGHT: 175.4 LBS | OXYGEN SATURATION: 96 % | SYSTOLIC BLOOD PRESSURE: 151 MMHG | DIASTOLIC BLOOD PRESSURE: 63 MMHG | HEIGHT: 64 IN

## 2025-04-16 DIAGNOSIS — N17.1 ACUTE RENAL FAILURE WITH ACUTE CORTICAL NECROSIS: ICD-10-CM

## 2025-04-16 DIAGNOSIS — I10 ESSENTIAL (PRIMARY) HYPERTENSION: ICD-10-CM

## 2025-04-16 DIAGNOSIS — M17.11 PRIMARY OSTEOARTHRITIS OF RIGHT KNEE: ICD-10-CM

## 2025-04-16 DIAGNOSIS — N18.4 CKD (CHRONIC KIDNEY DISEASE) STAGE 4, GFR 15-29 ML/MIN (HCC): Primary | ICD-10-CM

## 2025-04-16 DIAGNOSIS — N18.4 CHRONIC KIDNEY DISEASE, STAGE 4 (SEVERE) (HCC): ICD-10-CM

## 2025-04-16 DIAGNOSIS — I10 HYPERTENSION, ESSENTIAL: ICD-10-CM

## 2025-04-16 PROCEDURE — 1036F TOBACCO NON-USER: CPT | Performed by: INTERNAL MEDICINE

## 2025-04-16 PROCEDURE — G8428 CUR MEDS NOT DOCUMENT: HCPCS | Performed by: INTERNAL MEDICINE

## 2025-04-16 PROCEDURE — 1090F PRES/ABSN URINE INCON ASSESS: CPT | Performed by: INTERNAL MEDICINE

## 2025-04-16 PROCEDURE — G8417 CALC BMI ABV UP PARAM F/U: HCPCS | Performed by: INTERNAL MEDICINE

## 2025-04-16 PROCEDURE — 99214 OFFICE O/P EST MOD 30 MIN: CPT | Performed by: INTERNAL MEDICINE

## 2025-04-16 PROCEDURE — 3078F DIAST BP <80 MM HG: CPT | Performed by: INTERNAL MEDICINE

## 2025-04-16 PROCEDURE — G8400 PT W/DXA NO RESULTS DOC: HCPCS | Performed by: INTERNAL MEDICINE

## 2025-04-16 PROCEDURE — 1123F ACP DISCUSS/DSCN MKR DOCD: CPT | Performed by: INTERNAL MEDICINE

## 2025-04-16 PROCEDURE — 3077F SYST BP >= 140 MM HG: CPT | Performed by: INTERNAL MEDICINE

## 2025-04-16 PROCEDURE — 1126F AMNT PAIN NOTED NONE PRSNT: CPT | Performed by: INTERNAL MEDICINE

## 2025-04-16 RX ORDER — CIPROFLOXACIN 250 MG/1
250 TABLET, FILM COATED ORAL 2 TIMES DAILY
Qty: 6 TABLET | Refills: 0 | Status: SHIPPED | OUTPATIENT
Start: 2025-04-16 | End: 2025-04-19

## 2025-04-16 RX ORDER — HYDRALAZINE HYDROCHLORIDE 50 MG/1
50 TABLET, FILM COATED ORAL 3 TIMES DAILY
Qty: 90 TABLET | Refills: 3 | Status: SHIPPED | OUTPATIENT
Start: 2025-04-16

## 2025-04-16 RX ORDER — FUROSEMIDE 40 MG/1
40 TABLET ORAL DAILY PRN
Qty: 30 TABLET | Refills: 2 | Status: SHIPPED | OUTPATIENT
Start: 2025-04-16

## 2025-04-16 NOTE — PROGRESS NOTES
Progress  Notes by Maia Castillo MD                    Author: Maia Castillo MD  Service: --  Author Type: Physician       Filed:   Encounter Date:   Status: Signed                    Renal Consultation Note      NAME:  Kelly Qiu     :   1945    MRN:   [id number]       ATTENDING: No admitting provider for patient encounter.   PCP:  Parker Yoder NP      Date/Time:  25          Subjective:      CC : Patient is here for follow-up of her CKD 5      HPI    patient is here for follow-up today.     She denies any complaints today.    She was referred to vascular surgery at the last visit but she has not seen them yet.  Has had multiple deaths in her family she claims recent labs show creatinine of 3.7 with GFR 12  She denies any uremic or volume overload symptoms  Blood pressure is elevated in the office today  Denies any shortness of breath.  Denies any urinary complaints   She has discontinued taking diclofenac and Aleve at home.  Taking Tylenol for pain   She has done the kidney smart class and wishes to pursue in center hemodialysis  Takes Lasix only once a week             Past Medical History:       Diagnosis                                 Past Surgical History:         Procedure                                                Social History            Tobacco Use                                    Family History        Problem                                              No Known Allergies        Prior to Admission medications            Medication           furosemide (LASIX) 40 mg tablet    Calcium-Cholecalciferol, D3, 500 mg-10 mcg (400 unit) tab           atenoloL (TENORMIN) 25 mg tablet           amLODIPine (NORVASC) 10 mg tablet    atorvastatin (LIPITOR) 40 mg tablet           losartan (COZAAR) 25 mg tablet           REVIEW OF SYSTEMS:         Constitutional: Negative for chills and fever.    HENT: Negative.     Eyes: Negative.     Respiratory: Negative.     Cardiovascular:

## 2025-05-12 LAB — LEFT VENTRICULAR EJECTION FRACTION, EXTERNAL: NORMAL

## 2025-06-20 ENCOUNTER — HOSPITAL ENCOUNTER (OUTPATIENT)
Age: 80
Setting detail: SPECIMEN
Discharge: HOME | End: 2025-06-23
Payer: MEDICARE

## 2025-06-20 DIAGNOSIS — N18.4 CHRONIC KIDNEY DISEASE, STAGE 4 (SEVERE) (HCC): ICD-10-CM

## 2025-06-20 DIAGNOSIS — M17.11 PRIMARY OSTEOARTHRITIS OF RIGHT KNEE: ICD-10-CM

## 2025-06-20 DIAGNOSIS — N17.1 ACUTE RENAL FAILURE WITH ACUTE CORTICAL NECROSIS: ICD-10-CM

## 2025-06-20 DIAGNOSIS — I10 HYPERTENSION, ESSENTIAL: ICD-10-CM

## 2025-06-20 DIAGNOSIS — I10 ESSENTIAL (PRIMARY) HYPERTENSION: ICD-10-CM

## 2025-06-20 LAB
ALBUMIN SERPL-MCNC: 3.4 G/DL (ref 3.4–5)
ANION GAP SERPL CALC-SCNC: 15 MMOL/L
APPEARANCE UR: ABNORMAL
BACTERIA URNS QL MICRO: ABNORMAL /HPF
BASOPHILS # BLD: 0.04 K/UL (ref 0–0.1)
BASOPHILS NFR BLD: 0.8 % (ref 0–2)
BILIRUB UR QL: NEGATIVE
BUN SERPL-MCNC: 48 MG/DL (ref 6–23)
BUN/CREAT SERPL: 14
CA-I BLD-MCNC: 9.1 MG/DL (ref 8.5–10.1)
CA-I BLD-MCNC: 9.3 MG/DL (ref 8.5–10.1)
CHLORIDE SERPL-SCNC: 107 MMOL/L (ref 98–107)
CO2 SERPL-SCNC: 18 MMOL/L (ref 21–32)
COLOR UR: YELLOW
CREAT SERPL-MCNC: 3.35 MG/DL (ref 0.6–1.3)
CREAT UR-MCNC: 99.3 MG/DL (ref 30–125)
DIFFERENTIAL METHOD BLD: ABNORMAL
EOSINOPHIL # BLD: 0.27 K/UL (ref 0–0.4)
EOSINOPHIL NFR BLD: 5.7 % (ref 0–5)
EPITH CASTS URNS QL MICRO: ABNORMAL /LPF (ref 0–20)
ERYTHROCYTE [DISTWIDTH] IN BLOOD BY AUTOMATED COUNT: 14.8 % (ref 11.6–14.5)
GLUCOSE SERPL-MCNC: 95 MG/DL (ref 74–108)
GLUCOSE UR STRIP.AUTO-MCNC: NEGATIVE MG/DL
HCT VFR BLD AUTO: 33.7 % (ref 35–45)
HGB BLD-MCNC: 10.8 G/DL (ref 12–16)
HGB UR QL STRIP: NEGATIVE
IMM GRANULOCYTES # BLD AUTO: 0.01 K/UL (ref 0–0.04)
IMM GRANULOCYTES NFR BLD AUTO: 0.2 % (ref 0–0.5)
KETONES UR QL STRIP.AUTO: NEGATIVE MG/DL
LEUKOCYTE ESTERASE UR QL STRIP.AUTO: ABNORMAL
LYMPHOCYTES # BLD: 1.17 K/UL (ref 0.9–3.6)
LYMPHOCYTES NFR BLD: 24.8 % (ref 21–52)
MCH RBC QN AUTO: 27.8 PG (ref 24–34)
MCHC RBC AUTO-ENTMCNC: 32 G/DL (ref 31–37)
MCV RBC AUTO: 86.9 FL (ref 78–100)
MONOCYTES # BLD: 0.66 K/UL (ref 0.05–1.2)
MONOCYTES NFR BLD: 14 % (ref 3–10)
NEUTS SEG # BLD: 2.57 K/UL (ref 1.8–8)
NEUTS SEG NFR BLD: 54.5 % (ref 40–73)
NITRITE UR QL STRIP.AUTO: NEGATIVE
NRBC # BLD: 0 K/UL (ref 0–0.01)
NRBC BLD-RTO: 0 PER 100 WBC
PH UR STRIP: 5.5 (ref 5–8)
PHOSPHATE SERPL-MCNC: 4 MG/DL (ref 2.5–4.9)
PLATELET # BLD AUTO: 218 K/UL (ref 135–420)
PMV BLD AUTO: 11.7 FL (ref 9.2–11.8)
POTASSIUM SERPL-SCNC: 4 MMOL/L (ref 3.5–5.5)
PROT UR STRIP-MCNC: 30 MG/DL
PROT UR-MCNC: 17 MG/DL (ref 0–12)
PROT/CREAT UR-RTO: 0.2
PTH-INTACT SERPL-MCNC: 105 PG/ML (ref 15–65)
RBC # BLD AUTO: 3.88 M/UL (ref 4.2–5.3)
RBC #/AREA URNS HPF: ABNORMAL /HPF (ref 0–2)
SODIUM SERPL-SCNC: 139 MMOL/L (ref 136–145)
SP GR UR REFRACTOMETRY: 1.01 (ref 1–1.03)
UROBILINOGEN UR QL STRIP.AUTO: 0.2 EU/DL (ref 0.2–1)
WBC # BLD AUTO: 4.7 K/UL (ref 4.6–13.2)
WBC URNS QL MICRO: ABNORMAL /HPF (ref 0–4)

## 2025-06-20 PROCEDURE — 80069 RENAL FUNCTION PANEL: CPT

## 2025-06-20 PROCEDURE — 82570 ASSAY OF URINE CREATININE: CPT

## 2025-06-20 PROCEDURE — 81001 URINALYSIS AUTO W/SCOPE: CPT

## 2025-06-20 PROCEDURE — 84156 ASSAY OF PROTEIN URINE: CPT

## 2025-06-20 PROCEDURE — 83970 ASSAY OF PARATHORMONE: CPT

## 2025-06-20 PROCEDURE — 36415 COLL VENOUS BLD VENIPUNCTURE: CPT

## 2025-06-20 PROCEDURE — 85025 COMPLETE CBC W/AUTO DIFF WBC: CPT

## 2025-06-23 ENCOUNTER — OFFICE VISIT (OUTPATIENT)
Facility: CLINIC | Age: 80
End: 2025-06-23
Payer: MEDICARE

## 2025-06-23 VITALS
OXYGEN SATURATION: 100 % | HEIGHT: 64 IN | RESPIRATION RATE: 17 BRPM | HEART RATE: 55 BPM | WEIGHT: 177.8 LBS | DIASTOLIC BLOOD PRESSURE: 68 MMHG | TEMPERATURE: 97.3 F | SYSTOLIC BLOOD PRESSURE: 137 MMHG | BODY MASS INDEX: 30.35 KG/M2

## 2025-06-23 DIAGNOSIS — I10 HYPERTENSION, ESSENTIAL: ICD-10-CM

## 2025-06-23 DIAGNOSIS — E78.2 MIXED HYPERLIPIDEMIA: ICD-10-CM

## 2025-06-23 DIAGNOSIS — R73.03 PREDIABETES: Primary | ICD-10-CM

## 2025-06-23 LAB — HBA1C MFR BLD: 5.8 %

## 2025-06-23 PROCEDURE — 1036F TOBACCO NON-USER: CPT | Performed by: NURSE PRACTITIONER

## 2025-06-23 PROCEDURE — 99214 OFFICE O/P EST MOD 30 MIN: CPT | Performed by: NURSE PRACTITIONER

## 2025-06-23 PROCEDURE — 83036 HEMOGLOBIN GLYCOSYLATED A1C: CPT | Performed by: NURSE PRACTITIONER

## 2025-06-23 PROCEDURE — 1090F PRES/ABSN URINE INCON ASSESS: CPT | Performed by: NURSE PRACTITIONER

## 2025-06-23 PROCEDURE — G8400 PT W/DXA NO RESULTS DOC: HCPCS | Performed by: NURSE PRACTITIONER

## 2025-06-23 PROCEDURE — G8417 CALC BMI ABV UP PARAM F/U: HCPCS | Performed by: NURSE PRACTITIONER

## 2025-06-23 PROCEDURE — 1159F MED LIST DOCD IN RCRD: CPT | Performed by: NURSE PRACTITIONER

## 2025-06-23 PROCEDURE — 1126F AMNT PAIN NOTED NONE PRSNT: CPT | Performed by: NURSE PRACTITIONER

## 2025-06-23 PROCEDURE — G8427 DOCREV CUR MEDS BY ELIG CLIN: HCPCS | Performed by: NURSE PRACTITIONER

## 2025-06-23 PROCEDURE — 1160F RVW MEDS BY RX/DR IN RCRD: CPT | Performed by: NURSE PRACTITIONER

## 2025-06-23 PROCEDURE — 3075F SYST BP GE 130 - 139MM HG: CPT | Performed by: NURSE PRACTITIONER

## 2025-06-23 PROCEDURE — 3078F DIAST BP <80 MM HG: CPT | Performed by: NURSE PRACTITIONER

## 2025-06-23 PROCEDURE — 1123F ACP DISCUSS/DSCN MKR DOCD: CPT | Performed by: NURSE PRACTITIONER

## 2025-06-23 RX ORDER — ATENOLOL 25 MG/1
TABLET ORAL
COMMUNITY
Start: 2025-06-17 | End: 2025-06-26 | Stop reason: SDUPTHER

## 2025-06-23 RX ORDER — NIFEDIPINE 60 MG/1
TABLET, EXTENDED RELEASE ORAL
COMMUNITY
Start: 2025-06-17 | End: 2025-06-26 | Stop reason: SDUPTHER

## 2025-06-23 ASSESSMENT — PATIENT HEALTH QUESTIONNAIRE - PHQ9
1. LITTLE INTEREST OR PLEASURE IN DOING THINGS: NOT AT ALL
2. FEELING DOWN, DEPRESSED OR HOPELESS: NOT AT ALL
SUM OF ALL RESPONSES TO PHQ QUESTIONS 1-9: 0

## 2025-06-23 NOTE — PROGRESS NOTES
Fingerstick for HBA1C done in left hand first finger by Luis Hawkins MA per order of Parker Yoder NP after cleaning area with alcohol wipe.  Patient tolerated procedure well.   
Kelly Robbie Qiu presents today for   Chief Complaint   Patient presents with    Follow-up     3m f/u       Is someone accompanying this pt? no    Is the patient using any DME equipment during OV? no    Health Maintenance Due   Topic Date Due    Shingles vaccine (1 of 2) Never done    Pneumococcal 50+ years Vaccine (2 of 2 - PCV) 03/17/2015    Respiratory Syncytial Virus (RSV) Pregnant or age 60 yrs+ (1 - 1-dose 75+ series) Never done    DTaP/Tdap/Td vaccine (2 - Td or Tdap) 03/17/2024    COVID-19 Vaccine (5 - 2024-25 season) 09/01/2024    Lipids  07/02/2025         \"Have you been to the ER, urgent care clinic since your last visit?  Hospitalized since your last visit?\"    NO    “Have you seen or consulted any other health care providers outside our system since your last visit?”    NO           
Eosinophils % 04/08/2025 5.4 (H)  0.0 - 5.0 % Final    Basophils % 04/08/2025 1.0  0.0 - 2.0 % Final    Immature Granulocytes % 04/08/2025 0.2  0 - 0.5 % Final    Neutrophils Absolute 04/08/2025 2.64  1.80 - 8.00 K/UL Final    Lymphocytes Absolute 04/08/2025 1.46  0.90 - 3.60 K/UL Final    Monocytes Absolute 04/08/2025 0.56  0.05 - 1.20 K/UL Final    Eosinophils Absolute 04/08/2025 0.27  0.00 - 0.40 K/UL Final    Basophils Absolute 04/08/2025 0.05  0.00 - 0.10 K/UL Final    Immature Granulocytes Absolute 04/08/2025 0.01  0.00 - 0.04 K/UL Final    Differential Type 04/08/2025 AUTOMATED    Final    Color, UA 04/08/2025 Yellow    Final    Color Reference Range: Straw, Yellow or Dark Yellow    Appearance 04/08/2025 Cloudy    Final    Specific Gravity, UA 04/08/2025 1.012  1.005 - 1.030   Final    pH, Urine 04/08/2025 5.5  5.0 - 8.0   Final    Protein, UA 04/08/2025 30 (A)  Negative mg/dL Final    Glucose, Ur 04/08/2025 Negative  Negative mg/dL Final    Ketones, Urine 04/08/2025 Negative  Negative mg/dL Final    Bilirubin, Urine 04/08/2025 Negative  Negative   Final    Blood, Urine 04/08/2025 Negative  Negative   Final    Urobilinogen, Urine 04/08/2025 0.2  0.2 - 1.0 EU/dL Final    Nitrite, Urine 04/08/2025 Negative  Negative   Final    Leukocyte Esterase, Urine 04/08/2025 Large (A)  Negative   Final    Sodium 04/08/2025 139  136 - 145 mmol/L Final    Potassium 04/08/2025 4.1  3.5 - 5.5 mmol/L Final    Chloride 04/08/2025 108  100 - 111 mmol/L Final    CO2 04/08/2025 21  21 - 32 mmol/L Final    Anion Gap 04/08/2025 10  3.0 - 18.0 mmol/L Final    Glucose 04/08/2025 108 (H)  74 - 99 mg/dL Final    BUN 04/08/2025 59 (H)  7 - 18 mg/dL Final    Creatinine 04/08/2025 3.78 (H)  0.60 - 1.30 mg/dL Final    BUN/Creatinine Ratio 04/08/2025 16  12 - 20   Final    Est, Glom Filt Rate 04/08/2025 12 (L)  >60 ml/min/1.73m2 Final    Comment:    Pediatric calculator link: https://www.kidney.org/professionals/kdoqi/gfr_calculatorped

## 2025-06-26 ENCOUNTER — OFFICE VISIT (OUTPATIENT)
Age: 80
End: 2025-06-26
Payer: MEDICARE

## 2025-06-26 VITALS
HEIGHT: 64 IN | BODY MASS INDEX: 30.15 KG/M2 | DIASTOLIC BLOOD PRESSURE: 71 MMHG | SYSTOLIC BLOOD PRESSURE: 144 MMHG | WEIGHT: 176.6 LBS | HEART RATE: 61 BPM | OXYGEN SATURATION: 96 %

## 2025-06-26 DIAGNOSIS — N18.4 CKD (CHRONIC KIDNEY DISEASE) STAGE 4, GFR 15-29 ML/MIN (HCC): Primary | ICD-10-CM

## 2025-06-26 DIAGNOSIS — I10 HYPERTENSION, ESSENTIAL: ICD-10-CM

## 2025-06-26 DIAGNOSIS — N17.1 ACUTE RENAL FAILURE WITH ACUTE CORTICAL NECROSIS: ICD-10-CM

## 2025-06-26 PROCEDURE — 1123F ACP DISCUSS/DSCN MKR DOCD: CPT | Performed by: INTERNAL MEDICINE

## 2025-06-26 PROCEDURE — 1159F MED LIST DOCD IN RCRD: CPT | Performed by: INTERNAL MEDICINE

## 2025-06-26 PROCEDURE — 3078F DIAST BP <80 MM HG: CPT | Performed by: INTERNAL MEDICINE

## 2025-06-26 PROCEDURE — 1036F TOBACCO NON-USER: CPT | Performed by: INTERNAL MEDICINE

## 2025-06-26 PROCEDURE — 3077F SYST BP >= 140 MM HG: CPT | Performed by: INTERNAL MEDICINE

## 2025-06-26 PROCEDURE — G8400 PT W/DXA NO RESULTS DOC: HCPCS | Performed by: INTERNAL MEDICINE

## 2025-06-26 PROCEDURE — G8417 CALC BMI ABV UP PARAM F/U: HCPCS | Performed by: INTERNAL MEDICINE

## 2025-06-26 PROCEDURE — 1126F AMNT PAIN NOTED NONE PRSNT: CPT | Performed by: INTERNAL MEDICINE

## 2025-06-26 PROCEDURE — G8427 DOCREV CUR MEDS BY ELIG CLIN: HCPCS | Performed by: INTERNAL MEDICINE

## 2025-06-26 PROCEDURE — 1090F PRES/ABSN URINE INCON ASSESS: CPT | Performed by: INTERNAL MEDICINE

## 2025-06-26 PROCEDURE — 99214 OFFICE O/P EST MOD 30 MIN: CPT | Performed by: INTERNAL MEDICINE

## 2025-06-26 RX ORDER — HYDRALAZINE HYDROCHLORIDE 50 MG/1
50 TABLET, FILM COATED ORAL 3 TIMES DAILY
Qty: 90 TABLET | Refills: 3 | Status: SHIPPED | OUTPATIENT
Start: 2025-06-26

## 2025-06-26 RX ORDER — ATENOLOL 25 MG/1
25 TABLET ORAL DAILY
Qty: 90 TABLET | Refills: 1 | Status: SHIPPED | OUTPATIENT
Start: 2025-06-26

## 2025-06-26 RX ORDER — NIFEDIPINE 60 MG/1
60 TABLET, EXTENDED RELEASE ORAL DAILY
Qty: 90 TABLET | Refills: 1 | Status: SHIPPED | OUTPATIENT
Start: 2025-06-26

## 2025-06-26 NOTE — PROGRESS NOTES
Renal Consultation Note      NAME:  Kelly Qiu     :   1945    MRN:   [id number]       ATTENDING: No admitting provider for patient encounter.   PCP:  Parker Yoder NP      Date/Time:  25          Subjective:      CC : Patient is here for follow-up of her CKD 5      HPI    patient is here for follow-up today of her CKD.     She denies any complaints today.    She was referred to vascular surgery.  She saw Dr. Solomon with [facility].  Was told she has small veins after venous mapping was done  recent labs show creatinine of 3.3 with GFR 13  She denies any uremic or volume overload symptoms  Blood pressure is elevated in the office today  Denies any shortness of breath.  Denies any urinary complaints   She has discontinued taking diclofenac and Aleve at home.  Taking Tylenol for pain   She has done the kidney smart class and wishes to pursue in center hemodialysis  Takes Lasix only once a week             Past Medical History:       Diagnosis                                 Past Surgical History:         Procedure                                                Social History            Tobacco Use                                    Family History        Problem                                              No Known Allergies        Prior to Admission medications            Medication           furosemide (LASIX) 40 mg tablet    Calcium-Cholecalciferol, D3, 500 mg-10 mcg (400 unit) tab           atenoloL (TENORMIN) 25 mg tablet           amLODIPine (NORVASC) 10 mg tablet    atorvastatin (LIPITOR) 40 mg tablet           losartan (COZAAR) 25 mg tablet           REVIEW OF SYSTEMS:         Constitutional: Negative for chills and fever.    HENT: Negative.     Eyes: Negative.     Respiratory: Negative.     Cardiovascular: Negative.     Gastrointestinal: Negative for abdominal pain and nausea.    Skin: Negative.     Neurological: Negative.             Objective:     VITALS:     Visit Vitals

## 2025-08-18 ENCOUNTER — TELEPHONE (OUTPATIENT)
Age: 80
End: 2025-08-18

## 2025-08-20 ENCOUNTER — OFFICE VISIT (OUTPATIENT)
Age: 80
End: 2025-08-20
Payer: MEDICARE

## 2025-08-20 ENCOUNTER — HOSPITAL ENCOUNTER (OUTPATIENT)
Age: 80
Discharge: HOME | End: 2025-08-23
Payer: MEDICARE

## 2025-08-20 VITALS
HEART RATE: 57 BPM | HEIGHT: 64 IN | DIASTOLIC BLOOD PRESSURE: 68 MMHG | OXYGEN SATURATION: 97 % | SYSTOLIC BLOOD PRESSURE: 146 MMHG | BODY MASS INDEX: 29.45 KG/M2 | WEIGHT: 172.5 LBS

## 2025-08-20 DIAGNOSIS — N18.4 CKD (CHRONIC KIDNEY DISEASE) STAGE 4, GFR 15-29 ML/MIN (HCC): Primary | ICD-10-CM

## 2025-08-20 DIAGNOSIS — I10 HYPERTENSION, ESSENTIAL: ICD-10-CM

## 2025-08-20 DIAGNOSIS — N18.4 CKD (CHRONIC KIDNEY DISEASE) STAGE 4, GFR 15-29 ML/MIN (HCC): ICD-10-CM

## 2025-08-20 DIAGNOSIS — I10 ESSENTIAL (PRIMARY) HYPERTENSION: ICD-10-CM

## 2025-08-20 PROCEDURE — 99214 OFFICE O/P EST MOD 30 MIN: CPT | Performed by: INTERNAL MEDICINE

## 2025-08-20 PROCEDURE — 1123F ACP DISCUSS/DSCN MKR DOCD: CPT | Performed by: INTERNAL MEDICINE

## 2025-08-20 PROCEDURE — 1090F PRES/ABSN URINE INCON ASSESS: CPT | Performed by: INTERNAL MEDICINE

## 2025-08-20 PROCEDURE — G8428 CUR MEDS NOT DOCUMENT: HCPCS | Performed by: INTERNAL MEDICINE

## 2025-08-20 PROCEDURE — 3077F SYST BP >= 140 MM HG: CPT | Performed by: INTERNAL MEDICINE

## 2025-08-20 PROCEDURE — 1036F TOBACCO NON-USER: CPT | Performed by: INTERNAL MEDICINE

## 2025-08-20 PROCEDURE — G8400 PT W/DXA NO RESULTS DOC: HCPCS | Performed by: INTERNAL MEDICINE

## 2025-08-20 PROCEDURE — 71046 X-RAY EXAM CHEST 2 VIEWS: CPT

## 2025-08-20 PROCEDURE — G8417 CALC BMI ABV UP PARAM F/U: HCPCS | Performed by: INTERNAL MEDICINE

## 2025-08-20 PROCEDURE — 3078F DIAST BP <80 MM HG: CPT | Performed by: INTERNAL MEDICINE

## 2025-08-20 PROCEDURE — 1126F AMNT PAIN NOTED NONE PRSNT: CPT | Performed by: INTERNAL MEDICINE

## 2025-08-21 ENCOUNTER — TELEPHONE (OUTPATIENT)
Age: 80
End: 2025-08-21

## 2025-08-26 ENCOUNTER — TELEPHONE (OUTPATIENT)
Dept: FAMILY MEDICINE CLINIC | Facility: CLINIC | Age: 80
End: 2025-08-26

## 2025-08-26 ENCOUNTER — HOSPITAL ENCOUNTER (OUTPATIENT)
Age: 80
Discharge: HOME | End: 2025-08-29
Payer: MEDICARE

## 2025-08-26 DIAGNOSIS — I10 ESSENTIAL (PRIMARY) HYPERTENSION: ICD-10-CM

## 2025-08-26 DIAGNOSIS — I10 HYPERTENSION, ESSENTIAL: ICD-10-CM

## 2025-08-26 DIAGNOSIS — N18.5 CHRONIC RENAL FAILURE, STAGE 5 (HCC): Primary | ICD-10-CM

## 2025-08-26 DIAGNOSIS — N18.4 CKD (CHRONIC KIDNEY DISEASE) STAGE 4, GFR 15-29 ML/MIN (HCC): ICD-10-CM

## 2025-08-26 LAB
ALBUMIN SERPL-MCNC: 3.1 G/DL (ref 3.4–5)
ALBUMIN SERPL-MCNC: 3.1 G/DL (ref 3.4–5)
ALBUMIN/GLOB SERPL: 0.8
ALP SERPL-CCNC: 75 U/L (ref 45–117)
ALT SERPL-CCNC: 8 U/L (ref 10–35)
ANION GAP SERPL CALC-SCNC: 14 MMOL/L (ref 3–18)
APPEARANCE UR: ABNORMAL
AST SERPL W P-5'-P-CCNC: 24 U/L (ref 10–38)
BACTERIA URNS QL MICRO: ABNORMAL /HPF
BASOPHILS # BLD: 0.05 K/UL (ref 0–0.1)
BASOPHILS NFR BLD: 0.9 % (ref 0–2)
BILIRUB DIRECT SERPL-MCNC: 0.1 MG/DL (ref 0–0.2)
BILIRUB SERPL-MCNC: 0.3 MG/DL (ref 0.2–1)
BILIRUB UR QL: NEGATIVE
BUN SERPL-MCNC: 60 MG/DL (ref 6–23)
BUN/CREAT SERPL: 16
CA-I BLD-MCNC: 9.1 MG/DL (ref 8.5–10.1)
CA-I BLD-MCNC: 9.4 MG/DL (ref 8.5–10.1)
CHLORIDE SERPL-SCNC: 106 MMOL/L (ref 98–107)
CO2 SERPL-SCNC: 14 MMOL/L (ref 21–32)
COLOR UR: YELLOW
CREAT SERPL-MCNC: 3.63 MG/DL (ref 0.6–1.3)
CREAT UR-MCNC: 116 MG/DL (ref 30–125)
DIFFERENTIAL METHOD BLD: ABNORMAL
EOSINOPHIL # BLD: 0.32 K/UL (ref 0–0.4)
EOSINOPHIL NFR BLD: 5.7 % (ref 0–5)
EPITH CASTS URNS QL MICRO: ABNORMAL /LPF (ref 0–20)
ERYTHROCYTE [DISTWIDTH] IN BLOOD BY AUTOMATED COUNT: 15 % (ref 11.6–14.5)
GLOBULIN SER CALC-MCNC: 3.9 G/DL
GLUCOSE SERPL-MCNC: 104 MG/DL (ref 74–108)
GLUCOSE UR STRIP.AUTO-MCNC: NEGATIVE MG/DL
HCT VFR BLD AUTO: 31.9 % (ref 35–45)
HGB BLD-MCNC: 10.1 G/DL (ref 12–16)
HGB UR QL STRIP: NEGATIVE
IMM GRANULOCYTES # BLD AUTO: 0.01 K/UL (ref 0–0.04)
IMM GRANULOCYTES NFR BLD AUTO: 0.2 % (ref 0–0.5)
KETONES UR QL STRIP.AUTO: NEGATIVE MG/DL
LEUKOCYTE ESTERASE UR QL STRIP.AUTO: ABNORMAL
LYMPHOCYTES # BLD: 1.35 K/UL (ref 0.9–3.6)
LYMPHOCYTES NFR BLD: 24.1 % (ref 21–52)
MCH RBC QN AUTO: 27.4 PG (ref 24–34)
MCHC RBC AUTO-ENTMCNC: 31.7 G/DL (ref 31–37)
MCV RBC AUTO: 86.7 FL (ref 78–100)
MONOCYTES # BLD: 0.58 K/UL (ref 0.05–1.2)
MONOCYTES NFR BLD: 10.4 % (ref 3–10)
NEUTS SEG # BLD: 3.29 K/UL (ref 1.8–8)
NEUTS SEG NFR BLD: 58.7 % (ref 40–73)
NITRITE UR QL STRIP.AUTO: NEGATIVE
NRBC # BLD: 0 K/UL (ref 0–0.01)
NRBC BLD-RTO: 0 PER 100 WBC
PH UR STRIP: 5 (ref 5–8)
PHOSPHATE SERPL-MCNC: 3.7 MG/DL (ref 2.5–4.9)
PLATELET # BLD AUTO: 164 K/UL (ref 135–420)
PMV BLD AUTO: 11.4 FL (ref 9.2–11.8)
POTASSIUM SERPL-SCNC: 4.8 MMOL/L (ref 3.5–5.5)
PROT SERPL-MCNC: 7 G/DL (ref 6.4–8.2)
PROT UR STRIP-MCNC: 30 MG/DL
PROT UR-MCNC: 24 MG/DL (ref 0–12)
PROT/CREAT UR-RTO: 0.2
PTH-INTACT SERPL-MCNC: 65.6 PG/ML (ref 15–65)
RBC # BLD AUTO: 3.68 M/UL (ref 4.2–5.3)
RBC #/AREA URNS HPF: ABNORMAL /HPF (ref 0–2)
SODIUM SERPL-SCNC: 134 MMOL/L (ref 136–145)
SP GR UR REFRACTOMETRY: 1.01 (ref 1–1.03)
UROBILINOGEN UR QL STRIP.AUTO: 0.2 EU/DL (ref 0.2–1)
WBC # BLD AUTO: 5.6 K/UL (ref 4.6–13.2)
WBC URNS QL MICRO: ABNORMAL /HPF (ref 0–4)

## 2025-08-26 PROCEDURE — 81001 URINALYSIS AUTO W/SCOPE: CPT

## 2025-08-26 PROCEDURE — 85025 COMPLETE CBC W/AUTO DIFF WBC: CPT

## 2025-08-26 PROCEDURE — 36415 COLL VENOUS BLD VENIPUNCTURE: CPT

## 2025-08-26 PROCEDURE — 82570 ASSAY OF URINE CREATININE: CPT

## 2025-08-26 PROCEDURE — 80076 HEPATIC FUNCTION PANEL: CPT

## 2025-08-26 PROCEDURE — 80069 RENAL FUNCTION PANEL: CPT

## 2025-08-26 PROCEDURE — 84156 ASSAY OF PROTEIN URINE: CPT

## 2025-08-26 PROCEDURE — 83970 ASSAY OF PARATHORMONE: CPT

## 2025-08-28 ENCOUNTER — HOSPITAL ENCOUNTER (OUTPATIENT)
Age: 80
Discharge: HOME | End: 2025-08-31
Payer: MEDICARE

## 2025-08-28 DIAGNOSIS — N18.5 CHRONIC RENAL FAILURE, STAGE 5 (HCC): ICD-10-CM

## 2025-08-28 PROCEDURE — 86706 HEP B SURFACE ANTIBODY: CPT

## 2025-08-28 PROCEDURE — 86704 HEP B CORE ANTIBODY TOTAL: CPT

## 2025-08-28 PROCEDURE — 36415 COLL VENOUS BLD VENIPUNCTURE: CPT

## 2025-08-29 LAB
HBV CORE AB SERPL QL IA: NEGATIVE
HBV SURFACE AB SERPL IA-ACNC: <3.5 MIU/ML
HBV SURFACE AG SER QL: NONREACTIVE

## 2025-09-03 ENCOUNTER — OFFICE VISIT (OUTPATIENT)
Age: 80
End: 2025-09-03
Payer: MEDICARE

## 2025-09-03 VITALS
WEIGHT: 175.3 LBS | HEIGHT: 64 IN | OXYGEN SATURATION: 91 % | BODY MASS INDEX: 29.93 KG/M2 | HEART RATE: 55 BPM | DIASTOLIC BLOOD PRESSURE: 69 MMHG | SYSTOLIC BLOOD PRESSURE: 157 MMHG

## 2025-09-03 DIAGNOSIS — N18.5 CHRONIC RENAL FAILURE, STAGE 5 (HCC): ICD-10-CM

## 2025-09-03 DIAGNOSIS — I10 HYPERTENSION, ESSENTIAL: Primary | ICD-10-CM

## 2025-09-03 PROCEDURE — 1123F ACP DISCUSS/DSCN MKR DOCD: CPT | Performed by: INTERNAL MEDICINE

## 2025-09-03 PROCEDURE — 99212 OFFICE O/P EST SF 10 MIN: CPT | Performed by: INTERNAL MEDICINE

## 2025-09-03 PROCEDURE — 1036F TOBACCO NON-USER: CPT | Performed by: INTERNAL MEDICINE

## 2025-09-03 PROCEDURE — 1159F MED LIST DOCD IN RCRD: CPT | Performed by: INTERNAL MEDICINE

## 2025-09-03 PROCEDURE — 1090F PRES/ABSN URINE INCON ASSESS: CPT | Performed by: INTERNAL MEDICINE

## 2025-09-03 PROCEDURE — G8427 DOCREV CUR MEDS BY ELIG CLIN: HCPCS | Performed by: INTERNAL MEDICINE

## 2025-09-03 PROCEDURE — 3077F SYST BP >= 140 MM HG: CPT | Performed by: INTERNAL MEDICINE

## 2025-09-03 PROCEDURE — G8400 PT W/DXA NO RESULTS DOC: HCPCS | Performed by: INTERNAL MEDICINE

## 2025-09-03 PROCEDURE — 1126F AMNT PAIN NOTED NONE PRSNT: CPT | Performed by: INTERNAL MEDICINE

## 2025-09-03 PROCEDURE — 3078F DIAST BP <80 MM HG: CPT | Performed by: INTERNAL MEDICINE

## 2025-09-03 PROCEDURE — G8417 CALC BMI ABV UP PARAM F/U: HCPCS | Performed by: INTERNAL MEDICINE
